# Patient Record
Sex: MALE | Race: WHITE | NOT HISPANIC OR LATINO
[De-identification: names, ages, dates, MRNs, and addresses within clinical notes are randomized per-mention and may not be internally consistent; named-entity substitution may affect disease eponyms.]

---

## 2017-02-21 ENCOUNTER — RESULT CHARGE (OUTPATIENT)
Age: 32
End: 2017-02-21

## 2017-02-24 ENCOUNTER — OUTPATIENT (OUTPATIENT)
Dept: OUTPATIENT SERVICES | Age: 32
LOS: 1 days | Discharge: HOME | End: 2017-02-24

## 2017-02-27 ENCOUNTER — APPOINTMENT (OUTPATIENT)
Dept: PEDIATRIC CARDIOLOGY | Facility: CLINIC | Age: 32
End: 2017-02-27

## 2017-02-27 VITALS
OXYGEN SATURATION: 98 % | SYSTOLIC BLOOD PRESSURE: 124 MMHG | WEIGHT: 130.07 LBS | BODY MASS INDEX: 23.05 KG/M2 | DIASTOLIC BLOOD PRESSURE: 68 MMHG | HEIGHT: 62.99 IN | RESPIRATION RATE: 16 BRPM | HEART RATE: 68 BPM

## 2017-03-06 ENCOUNTER — APPOINTMENT (OUTPATIENT)
Dept: ELECTROPHYSIOLOGY | Facility: CLINIC | Age: 32
End: 2017-03-06

## 2017-06-06 ENCOUNTER — APPOINTMENT (OUTPATIENT)
Dept: ELECTROPHYSIOLOGY | Facility: CLINIC | Age: 32
End: 2017-06-06

## 2017-06-06 VITALS — SYSTOLIC BLOOD PRESSURE: 134 MMHG | DIASTOLIC BLOOD PRESSURE: 76 MMHG | HEART RATE: 66 BPM

## 2017-09-12 ENCOUNTER — APPOINTMENT (OUTPATIENT)
Dept: ELECTROPHYSIOLOGY | Facility: CLINIC | Age: 32
End: 2017-09-12
Payer: COMMERCIAL

## 2017-09-12 PROCEDURE — 93294 REM INTERROG EVL PM/LDLS PM: CPT

## 2017-09-12 PROCEDURE — 93296 REM INTERROG EVL PM/IDS: CPT

## 2017-12-12 ENCOUNTER — APPOINTMENT (OUTPATIENT)
Dept: ELECTROPHYSIOLOGY | Facility: CLINIC | Age: 32
End: 2017-12-12
Payer: COMMERCIAL

## 2017-12-12 PROCEDURE — 93280 PM DEVICE PROGR EVAL DUAL: CPT

## 2018-02-22 ENCOUNTER — OUTPATIENT (OUTPATIENT)
Dept: OUTPATIENT SERVICES | Age: 33
LOS: 1 days | Discharge: HOME | End: 2018-02-22

## 2018-02-26 ENCOUNTER — APPOINTMENT (OUTPATIENT)
Dept: PEDIATRIC CARDIOLOGY | Facility: CLINIC | Age: 33
End: 2018-02-26
Payer: COMMERCIAL

## 2018-02-26 VITALS
DIASTOLIC BLOOD PRESSURE: 80 MMHG | HEART RATE: 70 BPM | SYSTOLIC BLOOD PRESSURE: 130 MMHG | OXYGEN SATURATION: 98 % | HEIGHT: 62.99 IN | WEIGHT: 130.07 LBS | BODY MASS INDEX: 23.05 KG/M2 | RESPIRATION RATE: 16 BRPM

## 2018-02-26 PROCEDURE — 93000 ELECTROCARDIOGRAM COMPLETE: CPT

## 2018-02-26 PROCEDURE — 99215 OFFICE O/P EST HI 40 MIN: CPT | Mod: 25

## 2018-03-26 ENCOUNTER — TRANSCRIPTION ENCOUNTER (OUTPATIENT)
Age: 33
End: 2018-03-26

## 2018-03-26 ENCOUNTER — APPOINTMENT (OUTPATIENT)
Dept: ELECTROPHYSIOLOGY | Facility: CLINIC | Age: 33
End: 2018-03-26
Payer: COMMERCIAL

## 2018-03-26 PROCEDURE — 93294 REM INTERROG EVL PM/LDLS PM: CPT

## 2018-03-26 PROCEDURE — 93296 REM INTERROG EVL PM/IDS: CPT

## 2018-06-01 ENCOUNTER — MESSAGE (OUTPATIENT)
Age: 33
End: 2018-06-01

## 2018-06-29 ENCOUNTER — NON-APPOINTMENT (OUTPATIENT)
Age: 33
End: 2018-06-29

## 2018-06-29 ENCOUNTER — APPOINTMENT (OUTPATIENT)
Dept: ELECTROPHYSIOLOGY | Facility: CLINIC | Age: 33
End: 2018-06-29
Payer: COMMERCIAL

## 2018-06-29 VITALS — DIASTOLIC BLOOD PRESSURE: 69 MMHG | SYSTOLIC BLOOD PRESSURE: 137 MMHG | HEART RATE: 70 BPM | RESPIRATION RATE: 14 BRPM

## 2018-06-29 VITALS
HEIGHT: 62.99 IN | WEIGHT: 124 LBS | DIASTOLIC BLOOD PRESSURE: 73 MMHG | SYSTOLIC BLOOD PRESSURE: 128 MMHG | HEART RATE: 66 BPM | OXYGEN SATURATION: 97 % | BODY MASS INDEX: 21.97 KG/M2

## 2018-06-29 PROCEDURE — 93280 PM DEVICE PROGR EVAL DUAL: CPT

## 2018-06-29 PROCEDURE — 99213 OFFICE O/P EST LOW 20 MIN: CPT

## 2018-06-29 PROCEDURE — 93000 ELECTROCARDIOGRAM COMPLETE: CPT | Mod: 59

## 2018-10-01 ENCOUNTER — APPOINTMENT (OUTPATIENT)
Dept: ELECTROPHYSIOLOGY | Facility: CLINIC | Age: 33
End: 2018-10-01
Payer: COMMERCIAL

## 2018-10-01 PROCEDURE — 93294 REM INTERROG EVL PM/LDLS PM: CPT

## 2018-10-01 PROCEDURE — 93296 REM INTERROG EVL PM/IDS: CPT

## 2019-01-04 ENCOUNTER — APPOINTMENT (OUTPATIENT)
Dept: ELECTROPHYSIOLOGY | Facility: CLINIC | Age: 34
End: 2019-01-04
Payer: MEDICARE

## 2019-01-04 PROCEDURE — 93280 PM DEVICE PROGR EVAL DUAL: CPT

## 2019-02-01 ENCOUNTER — OUTPATIENT (OUTPATIENT)
Dept: OUTPATIENT SERVICES | Age: 34
LOS: 1 days | Discharge: HOME | End: 2019-02-01

## 2019-02-04 ENCOUNTER — APPOINTMENT (OUTPATIENT)
Dept: PEDIATRIC CARDIOLOGY | Facility: CLINIC | Age: 34
End: 2019-02-04
Payer: MEDICARE

## 2019-02-04 VITALS
WEIGHT: 125.66 LBS | HEIGHT: 62.99 IN | SYSTOLIC BLOOD PRESSURE: 124 MMHG | DIASTOLIC BLOOD PRESSURE: 74 MMHG | RESPIRATION RATE: 16 BRPM | BODY MASS INDEX: 22.27 KG/M2 | OXYGEN SATURATION: 98 % | HEART RATE: 70 BPM

## 2019-02-04 PROCEDURE — 93325 DOPPLER ECHO COLOR FLOW MAPG: CPT

## 2019-02-04 PROCEDURE — 93320 DOPPLER ECHO COMPLETE: CPT

## 2019-02-04 PROCEDURE — 93303 ECHO TRANSTHORACIC: CPT

## 2019-02-04 PROCEDURE — 93000 ELECTROCARDIOGRAM COMPLETE: CPT

## 2019-02-04 PROCEDURE — 99215 OFFICE O/P EST HI 40 MIN: CPT | Mod: 25

## 2019-02-14 NOTE — PHYSICAL EXAM
[General Appearance - Alert] : alert [General Appearance - In No Acute Distress] : in no acute distress [General Appearance - Well-Appearing] : well appearing [Attitude Uncooperative] : cooperative [Facies] : the head and face were normal in appearance [Outer Ear] : the ears and nose were normal in appearance [Respiration, Rhythm And Depth] : normal respiratory rhythm and effort [No Cough] : no cough [Normal Chest Appearance] : the chest was normal in appearance [Chest Visual Inspection Thoracic Deformity] : no chest wall deformity [Chest Palpation Tender Sternum] : no chest wall tenderness [No Sternal Instability] : no sternal instability [Apical Impulse] : quiet precordium with normal apical impulse [Heart Rate And Rhythm] : normal heart rate and rhythm [Heart Sounds] : normal S1 and S2 [Systolic] : systolic [LUSB] : LUSB [Ejection] : ejection [Back] : the murmur was transmitted to the back [Diastolic] : diastolic [II] : a grade 2/4  [LMSB] : LMSB  [[place]] : the murmur was transmitted to the apex [Bowel Sounds] : normal bowel sounds [Abdomen Soft] : soft [Nondistended] : nondistended [Abdomen Tenderness] : non-tender [No HSM] : no hepatosplenomegaly noted [Nail Clubbing] : no clubbing  or cyanosis of the fingers [Musculoskeletal Exam: Normal Movement Of All Extremities] : normal movements of all extremities [Musculoskeletal - Swelling] : no joint swelling or joint tenderness [] : no rash [Sternotomy] : sternotomy [Unremarkable] : unremarkable [Demonstrated Behavior - Infant Nonreactive To Parents] : interactive [General Appearance - Well Nourished] : well nourished [General Appearance - Well Developed] : well developed [Appearance Of Head] : the head was normocephalic [Sclera] : the conjunctiva were normal [Examination Of The Oral Cavity] : mucous membranes were moist and pink [Oropharynx] : the oropharynx was normal [Stridor] : no stridor was observed [Liver Tender To Palpation] : not tender [Liver Enlarged] : not enlarged [Spleen Tender] : not tender [Spleen Enlarged] : not enlarged [Motor Tone] : muscle strength and tone were normal [Skin Lesions] : no lesions [Skin Turgor] : normal turgor [Skin Color & Pigmentation] : normal skin color and pigmentation [FreeTextEntry1] : bilateral sub clavicular incisions

## 2019-02-14 NOTE — REASON FOR VISIT
[Follow-Up] : a follow-up visit for [Pulmonary Valve Insufficiency] : pulmonary valve insufficiency [Patient] : patient [Parents] : parents [FreeTextEntry3] : S/P COA Repair and VSD Repair, Pacemaker

## 2019-02-14 NOTE — REVIEW OF SYSTEMS
[Nl] : Genitourinary [Feeling Poorly] : not feeling poorly (malaise) [Fever] : no fever [Wgt Loss (___ Lbs)] : no recent weight loss [Pallor] : not pale [Eye Discharge] : no eye discharge [Redness] : no redness [Change in Vision] : no change in vision [Nasal Stuffiness] : no nasal congestion [Sore Throat] : no sore throat [Earache] : no earache [Loss Of Hearing] : no hearing loss [Cyanosis] : no cyanosis [Edema] : no edema [Diaphoresis] : not diaphoretic [Chest Pain] : no chest pain or discomfort [Exercise Intolerance] : no persistence of exercise intolerance [Palpitations] : no palpitations [Orthopnea] : no orthopnea [Fast HR] : no tachycardia [Tachypnea] : not tachypneic [Wheezing] : no wheezing [Cough] : no cough [Shortness Of Breath] : not expressed as feeling short of breath [Vomiting] : no vomiting [Diarrhea] : no diarrhea [Abdominal Pain] : no abdominal pain [Decrease In Appetite] : appetite not decreased [Fainting (Syncope)] : no fainting [Seizure] : no seizures [Headache] : no headache [Dizziness] : no dizziness [Limping] : no limping [Joint Pains] : no arthralgias [Joint Swelling] : no joint swelling [Rash] : no rash [Wound problems] : no wound problems [Easy Bruising] : no tendency for easy bruising [Swollen Glands] : no lymphadenopathy [Easy Bleeding] : no ~M tendency for easy bleeding [Nosebleeds] : no epistaxis [Sleep Disturbances] : ~T no sleep disturbances [Hyperactive] : no hyperactive behavior [Depression] : no depression [Anxiety] : no anxiety [Failure To Thrive] : no failure to thrive [Short Stature] : short stature was not noted [Jitteriness] : no jitteriness [Heat/Cold Intolerance] : no temperature intolerance [Dec Urine Output] : no oliguria

## 2019-02-14 NOTE — CONSULT LETTER
[Today's Date] : [unfilled] [Name] : Name: [unfilled] [] : : ~~ [Today's Date:] : [unfilled] [Dear  ___:] : Dear Dr. [unfilled]: [Consult] : I had the pleasure of evaluating your patient, [unfilled]. My full evaluation follows. [Consult - Multiple Provider] : Thank you very much for allowing us to participate in the care of this patient. If you have any questions, please do not hesitate to contact us. [Sincerely,] : Sincerely, [FreeTextEntry4] : Trev Gillespie MD [FreeTextEntry5] : [address] [FreeTextEntry6] : [address] [FreeTextEntry7] : [place] [address] [FreeTextEntry8] : [phone] [de-identified] : \par Heather Mariano, MSN, CPNP-AC, PC\par Pediatric Cardiology, Adult Congenital Cardiology\par [facility]\par

## 2019-02-14 NOTE — HISTORY OF PRESENT ILLNESS
[FreeTextEntry1] : Denis was seen in followup cardiology consultation February 4, 2019 at the Adult Congenital Heart Disease Clinic at [facility] for evaluation of his cardiovascular status in the setting of previous surgical repair of a subaortic ventricular septal defect and coarctation of the aorta. He was accompanied to this visit by his parents.\par \par Denis underwent coarctation repair using a subclavian flap technique with pulmonary artery banding in infancy by Dr. Alanis at [facility]. He subsequently underwent takedown of the pulmonary artery band and patch closure of the subaortic ventricular septal defect. Denis later required resection of a subaortic membrane. In 1997, he underwent balloon angioplasty of her recurrent coarctation in the catheterization laboratory. In January 2005, Denis underwent transvenous dual-chamber pacemaker placement for high-grade atrioventricular block. In February 2013, Denis underwent pacemaker generator change with Dr. Bassam Garcia at [facility].\par \par Denis also has a past medical history of cerebral palsy and developmental delay as well as a seizure disorder which has been controlled with Tegretol. His parents maintain legal guardianship of him. He continues to receive regular dental care and an annual flu shot.\par \par Denis's interim history remains unchanged. Since his last visit, Denis has been asymptomatic according to his parents. His activity level is unchanged and they do not notice any change in his level of activity. He walks up and down the stairs in the house without any shortness of breath.He has no complaints of chest pain, palpitations, dizziness, exercise intolerance or syncope. He continues to participate actively in his day program at the [facility] [employer].

## 2019-02-14 NOTE — CARDIOLOGY SUMMARY
[Today's Date] : [unfilled] [FreeTextEntry1] : Atrial sensing, ventricular pacing with 100% capture [FreeTextEntry2] : Summary:\par 1. History of ventricular septal defect.\par 2. Status post placement of a main pulmonary artery band and status post takedown of main pulmonary\par artery band.\par 3. Status post subclavian flap repair of coarctation.\par 4. Status post patch closure of subaortic ventricular septal defect.\par 5. Status post resection of subaortic membrane.\par 6. Status post balloon angioplasty of recurrent coarctation.\par 7. Mild tricuspid valve regurgitation.\par 8. Based on tricuspid regurgitation gradient, estimated RVp is 30-35 mmHg plus the right atrial pressure.\par 9. The aortic isthmus and proximal aorta are diffusely hypoplastic with acceleration of flow in this region\par with the peak gradient of 30 mmHg. The descending aorta Doppler shows prolonged continuation of\par flow during diastole.\par 10. No residual left ventricular outflow tract obstruction.\par 11. Mild aortic valve regurgitation.\par 12. There is mild distortion of the main pulmonary artery segment related to the previous pulmonary artery\par band with an associated mild supravalvar pulmonary artery stenosis, with the peak gradient of 35\par mmHg and mean of 15 mmHg.\par 13. Moderate pulmonary valve regurgitation.\par 14. No residual ventricular septal defect.\par 15. Moderately dilated right ventricle.\par 16. Qualitatively normal right ventricular systolic function.\par 17. Mild concentric left ventricular hypertrophy.\par 18. Qualitatively normal left ventricular systolic function.\par 19. No pericardial effusion

## 2019-03-11 ENCOUNTER — FORM ENCOUNTER (OUTPATIENT)
Age: 34
End: 2019-03-11

## 2019-03-12 ENCOUNTER — OUTPATIENT (OUTPATIENT)
Dept: OUTPATIENT SERVICES | Facility: HOSPITAL | Age: 34
LOS: 1 days | End: 2019-03-12
Payer: MEDICARE

## 2019-03-12 ENCOUNTER — APPOINTMENT (OUTPATIENT)
Dept: CARDIOLOGY | Facility: CLINIC | Age: 34
End: 2019-03-12

## 2019-03-12 VITALS
RESPIRATION RATE: 16 BRPM | HEART RATE: 70 BPM | TEMPERATURE: 97 F | OXYGEN SATURATION: 100 % | SYSTOLIC BLOOD PRESSURE: 130 MMHG | DIASTOLIC BLOOD PRESSURE: 63 MMHG

## 2019-03-12 VITALS
DIASTOLIC BLOOD PRESSURE: 72 MMHG | HEART RATE: 67 BPM | RESPIRATION RATE: 16 BRPM | OXYGEN SATURATION: 96 % | SYSTOLIC BLOOD PRESSURE: 120 MMHG | TEMPERATURE: 97 F

## 2019-03-12 DIAGNOSIS — R07.9 CHEST PAIN, UNSPECIFIED: ICD-10-CM

## 2019-03-12 DIAGNOSIS — I37.1 NONRHEUMATIC PULMONARY VALVE INSUFFICIENCY: ICD-10-CM

## 2019-03-12 PROCEDURE — 75574 CT ANGIO HRT W/3D IMAGE: CPT

## 2019-03-12 PROCEDURE — 75574 CT ANGIO HRT W/3D IMAGE: CPT | Mod: 26

## 2019-04-08 ENCOUNTER — APPOINTMENT (OUTPATIENT)
Dept: ELECTROPHYSIOLOGY | Facility: CLINIC | Age: 34
End: 2019-04-08
Payer: MEDICARE

## 2019-04-08 PROCEDURE — 93296 REM INTERROG EVL PM/IDS: CPT

## 2019-04-08 PROCEDURE — 93294 REM INTERROG EVL PM/LDLS PM: CPT

## 2019-04-15 ENCOUNTER — OUTPATIENT (OUTPATIENT)
Dept: OUTPATIENT SERVICES | Age: 34
LOS: 1 days | End: 2019-04-15

## 2019-04-15 ENCOUNTER — APPOINTMENT (OUTPATIENT)
Dept: PEDIATRIC CARDIOLOGY | Facility: CLINIC | Age: 34
End: 2019-04-15
Payer: MEDICARE

## 2019-04-15 VITALS
HEIGHT: 63.15 IN | TEMPERATURE: 97 F | SYSTOLIC BLOOD PRESSURE: 128 MMHG | RESPIRATION RATE: 20 BRPM | HEART RATE: 88 BPM | WEIGHT: 124.78 LBS | DIASTOLIC BLOOD PRESSURE: 76 MMHG | OXYGEN SATURATION: 96 %

## 2019-04-15 VITALS
WEIGHT: 124.78 LBS | OXYGEN SATURATION: 96 % | RESPIRATION RATE: 18 BRPM | BODY MASS INDEX: 22.11 KG/M2 | HEART RATE: 88 BPM | SYSTOLIC BLOOD PRESSURE: 132 MMHG | HEIGHT: 63.15 IN | DIASTOLIC BLOOD PRESSURE: 72 MMHG

## 2019-04-15 VITALS — SYSTOLIC BLOOD PRESSURE: 128 MMHG | DIASTOLIC BLOOD PRESSURE: 59 MMHG

## 2019-04-15 DIAGNOSIS — Q25.1 COARCTATION OF AORTA: ICD-10-CM

## 2019-04-15 DIAGNOSIS — G80.9 CEREBRAL PALSY, UNSPECIFIED: ICD-10-CM

## 2019-04-15 DIAGNOSIS — I44.2 ATRIOVENTRICULAR BLOCK, COMPLETE: ICD-10-CM

## 2019-04-15 DIAGNOSIS — G40.909 EPILEPSY, UNSPECIFIED, NOT INTRACTABLE, WITHOUT STATUS EPILEPTICUS: ICD-10-CM

## 2019-04-15 LAB
ANION GAP SERPL CALC-SCNC: 14 MMO/L — SIGNIFICANT CHANGE UP (ref 7–14)
BLD GP AB SCN SERPL QL: NEGATIVE — SIGNIFICANT CHANGE UP
BUN SERPL-MCNC: 13 MG/DL — SIGNIFICANT CHANGE UP (ref 7–23)
CALCIUM SERPL-MCNC: 9.8 MG/DL — SIGNIFICANT CHANGE UP (ref 8.4–10.5)
CHLORIDE SERPL-SCNC: 102 MMOL/L — SIGNIFICANT CHANGE UP (ref 98–107)
CO2 SERPL-SCNC: 25 MMOL/L — SIGNIFICANT CHANGE UP (ref 22–31)
CREAT SERPL-MCNC: 0.66 MG/DL — SIGNIFICANT CHANGE UP (ref 0.5–1.3)
GLUCOSE SERPL-MCNC: 95 MG/DL — SIGNIFICANT CHANGE UP (ref 70–99)
HCT VFR BLD CALC: 46.6 % — SIGNIFICANT CHANGE UP (ref 39–50)
HGB BLD-MCNC: 15.7 G/DL — SIGNIFICANT CHANGE UP (ref 13–17)
MCHC RBC-ENTMCNC: 28.8 PG — SIGNIFICANT CHANGE UP (ref 27–34)
MCHC RBC-ENTMCNC: 33.7 % — SIGNIFICANT CHANGE UP (ref 32–36)
MCV RBC AUTO: 85.3 FL — SIGNIFICANT CHANGE UP (ref 80–100)
NRBC # FLD: 0 K/UL — SIGNIFICANT CHANGE UP (ref 0–0)
PLATELET # BLD AUTO: 213 K/UL — SIGNIFICANT CHANGE UP (ref 150–400)
PMV BLD: 10.1 FL — SIGNIFICANT CHANGE UP (ref 7–13)
POTASSIUM SERPL-MCNC: 4.4 MMOL/L — SIGNIFICANT CHANGE UP (ref 3.5–5.3)
POTASSIUM SERPL-SCNC: 4.4 MMOL/L — SIGNIFICANT CHANGE UP (ref 3.5–5.3)
RBC # BLD: 5.46 M/UL — SIGNIFICANT CHANGE UP (ref 4.2–5.8)
RBC # FLD: 11.7 % — SIGNIFICANT CHANGE UP (ref 10.3–14.5)
RH IG SCN BLD-IMP: POSITIVE — SIGNIFICANT CHANGE UP
SODIUM SERPL-SCNC: 141 MMOL/L — SIGNIFICANT CHANGE UP (ref 135–145)
WBC # BLD: 6.05 K/UL — SIGNIFICANT CHANGE UP (ref 3.8–10.5)
WBC # FLD AUTO: 6.05 K/UL — SIGNIFICANT CHANGE UP (ref 3.8–10.5)

## 2019-04-15 PROCEDURE — XXXXX: CPT

## 2019-04-15 NOTE — REASON FOR VISIT
[Initial Consultation] : an initial consultation for [Coarctation Of The Aorta] : coarctation of the aorta [Ventricular Septal Defect] : a ventricular septal defect [FreeTextEntry1] : subaortic membrane [Parents] : parents

## 2019-04-15 NOTE — H&P PST PEDIATRIC - ASSESSMENT
34 y/o with repaired subaortic VSD and aortic coarctation, dual chamber pacemaker for high grade A-V block.  He is 100% ventricular paced with atrial sensing.  He remains asymptomatic from a cardiac perspective.    His most recent ECHO demonstrates a diffusely hypoplastic proximal aorta with acceleration of flow (pk gradient 30 mmHg).  There is also mild AI with normal LV size and function.  Mild supravalvar AS is also noted (pk gradient 35mmHg) 34 y/o with developmental delays, intellectual disability, dystonic CP, well controlled seizure d/o, s/p CVA, repaired subaortic VSD and aortic coarctation, dual chamber pacemaker for high grade A-V block.  He is 100% ventricular paced with atrial sensing.  He remains asymptomatic from a cardiac perspective.    His most recent ECHO demonstrates a diffusely hypoplastic proximal aorta with acceleration of flow (pk gradient 30 mmHg).  There is also mild AI with normal LV size and function.  Mild supravalvar AS is also noted (pk gradient 35mmHg).    No h/o GA or hemostasis issues with prior interventional/surgical experiences.

## 2019-04-15 NOTE — H&P PST PEDIATRIC - SYMPTOMS
none s/p excision of benign moles last seizure greater than 13 years ago Medtronic dual chamber pacemaker Model#ADDRL1 Medtronic dual chamber pacemaker Model#ADDRL1  No SOB/palpitations/chest pain/change in tolerance of physical activity

## 2019-04-15 NOTE — H&P PST PEDIATRIC - CARDIOVASCULAR
details Symmetric upper and lower extremity pulses of normal amplitude/Regular rate and variability II/VI systolic ejection murmur heard best at LMSB and LUSB and heard posteriorly  palpable pulses in UE and LE  brisk cap refill

## 2019-04-15 NOTE — CONSULT LETTER
[Today's Date] : [unfilled] [Name] : Name: [unfilled] [] : : ~~ [Today's Date:] : [unfilled] [Dear  ___:] : Dear Dr. [unfilled]: [Consult] : I had the pleasure of evaluating your patient, [unfilled]. My full evaluation follows. [Consult - Single Provider] : Thank you very much for allowing me to participate in the care of this patient. If you have any questions, please do not hesitate to contact me. [Sincerely,] : Sincerely, [FreeTextEntry4] : Pete De Luna MD

## 2019-04-15 NOTE — H&P PST PEDIATRIC - ECHO AND INTERPRETATION
ECHO 2-4-19 mild TR with estimated RVp 30-35mmHg plus the right atrial pressure  the aortic isthmus and proximal aorta are diffusely hypoplastic with acceleration of flow (pk gradient 30 mmHg), no residual LVOTO, mild AI, mild distortion of the MPA segment related to the previous PA band with an associated mild supravalvar PS (pk gradient 35 mmHg), moderate PI, no residual VSD, moderate RVD with normal RV systolic function, mild concentric LVH, normal LV systolic function

## 2019-04-15 NOTE — H&P PST PEDIATRIC - GENITOURINARY
No costovertebral angle tenderness bilateral inguinal hernias with no adenopathy/erythema/rash/lesions

## 2019-04-15 NOTE — H&P PST PEDIATRIC - HEENT
negative Anicteric conjunctivae/Normal oropharynx/Normal dentition/Red reflex intact/External ear normal/No oral lesions/Extra occular movements intact/Normal tympanic membranes

## 2019-04-15 NOTE — H&P PST PEDIATRIC - COMMENTS
Denis is a 34 y/o male with CP, seizure d/o and developmental delays with PMH is remarkable for surgical repair of subaortic VSD and aortic coarctation using a subclavian flap technique with PA banding.  There was subsequent takedown of the PA banding and patch closure of subaortic VSD.  He also underwent resection of DANIELLE.  Balloon angioplasty was necessary for recurrence of coarctation.  He is also s/p transvenous dual-chamber pacemaker placement for high grade A-V block.  His most recent pacemaker generator change was in 2013.  Denis's level of activity remains unchanged.  He denies SOB, chest pain, palpitations, dizziness or exercise intolerance. Received Influenza vaccine this season Denis is a 32 y/o male with CP, h/o CVA, seizure d/o and developmental delays with PMH remarkable for surgical repair of subaortic VSD and aortic coarctation using a subclavian flap technique with PA banding.  There was subsequent takedown of the PA banding and patch closure of subaortic VSD.  He also underwent resection of DANILELE.  Balloon angioplasty was necessary for recurrence of coarctation.  He is also s/p transvenous dual-chamber pacemaker placement for high grade A-V block.  His most recent pacemaker generator change was in 2013.  Denis's level of activity remains unchanged.  He denies SOB, chest pain, palpitations, dizziness or exercise intolerance. Denis is a 32 y/o male with CP, dystonia,  h/o CVA, seizure d/o, intellectual disability and developmental delays with PMH remarkable for surgical repair of subaortic VSD and aortic coarctation using a subclavian flap technique with PA banding, subsequent takedown of the PA banding and patch closure of subaortic VSD.  He also underwent resection of DANIELLE.  Balloon angioplasty was necessary for recurrence of coarctation.  He is also s/p transvenous dual-chamber pacemaker placement for high grade A-V block.  His most recent pacemaker generator change was in 2013.  Denis's level of activity remains unchanged.  He denies SOB, chest pain, palpitations, dizziness or exercise intolerance.

## 2019-04-15 NOTE — H&P PST PEDIATRIC - NSICDXPASTSURGICALHX_GEN_ALL_CORE_FT
PAST SURGICAL HISTORY:  Arthropathy of Hip with reconstructive surgery of L hip    History of Achilles tendon repair bilateral    History of aortic coarctation repair     Pacemaker Medtronic dual chamber for 3rd degree AVB 3/4/05    S/P VSD repair with removal of pulmonary artery band

## 2019-04-15 NOTE — H&P PST PEDIATRIC - ANESTHESIA, PREVIOUS REACTION, PROFILE
none/No issues with sedation and GA experiencesMost recent GA exposure was for CT scan (due to dystonia)

## 2019-04-15 NOTE — H&P PST PEDIATRIC - NSICDXPROBLEM_GEN_ALL_CORE_FT
PROBLEM DIAGNOSES  Problem: Cerebral palsy  Assessment and Plan: May resume current therapies and mild physical education activities upon discharge as/when instructed by Cardiac Cath team    Problem: Seizure disorder  Assessment and Plan: Continue current AET regimen.  May take am dose, no later than 5 am, with sips of clears    Problem: Heart block AV complete  Assessment and Plan: Medtronic pacemaker with last generator change in 2013    Problem: Postsurgical recurrent coarctation of aorta  Assessment and Plan: Cardiac catheterization with stent placement

## 2019-04-15 NOTE — H&P PST PEDIATRIC - OTHER CARE PROVIDERS
Dr. Pete De Luna Dr. Pete De Luna; Dr Bassam Garcia (EPS); Dr Afua Jeffery (Neuro); Dr Raleigh Awad; Dr Doris Maldonado (Dental); Dr Neil Barker (DP) Dr. Pete De Luna; Dr Bassam Garcia (EPS); Dr Afua Jeffery (Neuro); Dr Raleigh Awad; Dr Doris Maldonado (Dental); Dr Neil Barker (DPM); Dr Kevin Lee ([facility])

## 2019-04-15 NOTE — H&P PST PEDIATRIC - REASON FOR ADMISSION
Pre Cardiac catheterization evaluation and preparation-scheduled for 4-16-19 with Dr. Taya Forde Pre procedural/sedation/anesthesia evaluation and preparation for Cardiac Catheterization, Stent placement -scheduled on 4-16-19 with Dr. Taya Forde

## 2019-04-15 NOTE — H&P PST PEDIATRIC - SKIN
details No rash/Skin intact and not indurated well healed surgical scars-b/L subclavicular and sternotomy

## 2019-04-15 NOTE — H&P PST PEDIATRIC - GROWTH AND DEVELOPMENT COMMENT, PEDS PROFILE
Hon[employer]ary member of the [employer]  Attends [facility] [employer]-Food shopping, delivers mail, delivers flowers  mows the lawn, plays yaneli novoa Honorary member of the [employer]  Attends [facility]- participates in food shopping, delivers mail, delivers flowers  mows the lawn, plays yaneli novoa Honorary member of the [employer]  Attends [facility]- participates in food shopping, delivers mail, delivers flowers  mows the lawn (supervised), plays yaneli novoa

## 2019-04-15 NOTE — H&P PST PEDIATRIC - EXTREMITIES
FROM limited by CP, dystonia but able to position himself at the exam table  able to ambulate independently

## 2019-04-15 NOTE — H&P PST PEDIATRIC - NSICDXPASTMEDICALHX_GEN_ALL_CORE_FT
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

## 2019-04-16 ENCOUNTER — OUTPATIENT (OUTPATIENT)
Dept: INPATIENT UNIT | Age: 34
LOS: 1 days | Discharge: HOME | End: 2019-04-16

## 2019-04-16 VITALS
RESPIRATION RATE: 16 BRPM | HEART RATE: 76 BPM | SYSTOLIC BLOOD PRESSURE: 111 MMHG | OXYGEN SATURATION: 96 % | DIASTOLIC BLOOD PRESSURE: 68 MMHG

## 2019-04-16 VITALS
SYSTOLIC BLOOD PRESSURE: 145 MMHG | RESPIRATION RATE: 18 BRPM | TEMPERATURE: 98 F | OXYGEN SATURATION: 97 % | HEART RATE: 65 BPM | DIASTOLIC BLOOD PRESSURE: 75 MMHG | WEIGHT: 124.78 LBS | HEIGHT: 63.15 IN

## 2019-04-16 DIAGNOSIS — Q25.1 COARCTATION OF AORTA: ICD-10-CM

## 2019-04-16 RX ORDER — SODIUM CHLORIDE 9 MG/ML
1000 INJECTION, SOLUTION INTRAVENOUS
Qty: 0 | Refills: 0 | Status: DISCONTINUED | OUTPATIENT
Start: 2019-04-16 | End: 2019-04-16

## 2019-04-16 RX ORDER — ONDANSETRON 8 MG/1
4 TABLET, FILM COATED ORAL ONCE
Qty: 0 | Refills: 0 | Status: COMPLETED | OUTPATIENT
Start: 2019-04-16 | End: 2019-04-16

## 2019-04-16 RX ORDER — ACETAMINOPHEN 500 MG
650 TABLET ORAL EVERY 6 HOURS
Qty: 0 | Refills: 0 | Status: DISCONTINUED | OUTPATIENT
Start: 2019-04-16 | End: 2019-04-16

## 2019-04-16 RX ADMIN — SODIUM CHLORIDE 50 MILLILITER(S): 9 INJECTION, SOLUTION INTRAVENOUS at 16:45

## 2019-04-16 RX ADMIN — ONDANSETRON 8 MILLIGRAM(S): 8 TABLET, FILM COATED ORAL at 16:45

## 2019-04-16 NOTE — PROCEDURE NOTE - SUPERVISORY STATEMENT
I was present and scrubbed for entire case. Agree with above note including A/P with corrections made to above.

## 2019-04-16 NOTE — ASU DISCHARGE PLAN (ADULT/PEDIATRIC) - CARE PROVIDER_API CALL
Pete De Luna (MD)  Adult Congenital Heart Disease; Pediatric Cardiology; Pediatrics  [id number] [address] [place] [address]  Phone: [phone]  Fax: [phone]  Follow Up Time:

## 2019-04-16 NOTE — PROCEDURE NOTE - GENERAL INDICATIONS
History of subaortic VSD and coarctation now with recoarctation an PAPVR S/p repair of subaortic VSD and coarctation, now with possible re-coarctation and newly diagnosed PAPVC

## 2019-04-16 NOTE — ASU DISCHARGE PLAN (ADULT/PEDIATRIC) - CALL YOUR DOCTOR IF YOU HAVE ANY OF THE FOLLOWING:
Nausea and vomiting that does not stop/Bleeding that does not stop/Swelling that gets worse/Pain not relieved by Medications/Wound/Surgical Site with redness, or foul smelling discharge or pus/Numbness, tingling, color or temperature change to extremity

## 2019-04-16 NOTE — PROCEDURE NOTE - ADDITIONAL PROCEDURE DETAILS
Access LFV 7Fr ; RFA 5 Fr. 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 Normal right sided pressure. There is PSEG across the arch and normal LVEDP at 10 mmHg Access LFV 7Fr ; RFA 5 Fr. 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 Normal right sided pressure. There is PSEG across the arch and normal LVEDP at 10 mmHg. On angiogram, the transverse arch mildly hypoplastic with no discrete stenosis. 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    A/P: 34yo M s/p cath for hemodynamic evaluation and delineate the anatomy.   - RR then d/c later today.  - IF NO palpable PULSE, call cards   - F/u with Dr De Luna (primary cards) in 1 month.  - Above shared wfamily & primary cards. Access LFV 7Fr ; RFA 5 Fr with US guidance.   Sat data (%): SVC 71 , PA 83, Ao 98; Nl CI with Qp:Qs from 1.4 to 1.7 due to anomalous RUPV to RSVC.  Normal left and right heart pressures with < 10mmHg PSEG across tortuous aortic arch. .  Angios showed transverse & distal arch mildly hypoplastic with some tortuosity but no discrete stenosis; patent LSCA. 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 Free PI (annulus 35+mm) with mild supravalvar narrowin (24mm) at site of prior band and no signif branch PA obstruction    A/P: 32yo M s/p VSD & coarct repair with free PI and newly dx'd PAPVC, now s/p diagnostic cath with stable hemodynamics.  - RR then d/c later today.  - IF NO palpable PULSE, call cards   - F/u with Dr De Luna ([facility] team) in 1 month.  - Above shared w family & primary cards.  - will discuss at cath ^ V surg conference re: further surgical plan for PVR and PAPVC

## 2019-04-17 PROBLEM — G40.909 EPILEPSY, UNSPECIFIED, NOT INTRACTABLE, WITHOUT STATUS EPILEPTICUS: Chronic | Status: ACTIVE | Noted: 2019-04-15

## 2019-04-17 PROBLEM — F79 UNSPECIFIED INTELLECTUAL DISABILITIES: Chronic | Status: ACTIVE | Noted: 2019-04-15

## 2019-05-20 ENCOUNTER — APPOINTMENT (OUTPATIENT)
Dept: PEDIATRIC CARDIOLOGY | Facility: CLINIC | Age: 34
End: 2019-05-20
Payer: MEDICARE

## 2019-05-20 PROCEDURE — 99213 OFFICE O/P EST LOW 20 MIN: CPT

## 2019-06-28 ENCOUNTER — APPOINTMENT (OUTPATIENT)
Dept: CARDIOTHORACIC SURGERY | Facility: CLINIC | Age: 34
End: 2019-06-28
Payer: MEDICARE

## 2019-06-28 DIAGNOSIS — Q26.3 PARTIAL ANOMALOUS PULMONARY VENOUS CONNECTION: ICD-10-CM

## 2019-06-28 DIAGNOSIS — Q22.1 CONGENITAL PULMONARY VALVE STENOSIS: ICD-10-CM

## 2019-06-28 DIAGNOSIS — Q21.0 VENTRICULAR SEPTAL DEFECT: ICD-10-CM

## 2019-06-28 DIAGNOSIS — Q25.1 COARCTATION OF AORTA: ICD-10-CM

## 2019-06-28 PROCEDURE — 99204 OFFICE O/P NEW MOD 45 MIN: CPT

## 2019-07-10 ENCOUNTER — APPOINTMENT (OUTPATIENT)
Dept: ELECTROPHYSIOLOGY | Facility: CLINIC | Age: 34
End: 2019-07-10
Payer: MEDICARE

## 2019-07-10 VITALS — DIASTOLIC BLOOD PRESSURE: 77 MMHG | SYSTOLIC BLOOD PRESSURE: 131 MMHG | RESPIRATION RATE: 14 BRPM | HEART RATE: 75 BPM

## 2019-07-10 PROCEDURE — 93280 PM DEVICE PROGR EVAL DUAL: CPT

## 2019-07-23 NOTE — CONSULT LETTER
[Sincerely,] : Sincerely, [Dear  ___] : Dear  [unfilled], [FreeTextEntry2] : June 28, 2019\par \par Pete De Luna MD\par [address] [place] [address]\par [place] [id number] [FreeTextEntry1] : I had the pleasure of seeing your patient Denis Ortiz in the office today for a preop visit.  He is referred for pulmonary valve replacement.  The procedure with risks, benefits and alternatives were explained in detail to his parents.  They asked excellent questions and are willing to proceed.  They will select a date after reviewing their calendar for upcoming events.  \par \par Once again, I would like to thank you for allowing us to participate in Denis's care.  If I can be of any further assistance to you with Denis or any other patient please do not hesitate to contact our office. [FreeTextEntry3] : Luis Enrique Crowley MD\par Surgeon-in-Chief\par \par CC:  Bassam Garcia MD

## 2019-07-23 NOTE — ASSESSMENT
[FreeTextEntry1] : [age] year old male who is status post coarctation repair and vsd closure.  He has severe pulmonary regurgitation from a previous pulmonary artery debanding and complete heart block for which he has a permanent transvenous pacemaker.  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  He is referred for pulmonary valve replacement.  The procedure with risks, benefits and alternatives was explained in detail.  All questions were answered.  Need for anesthesia and blood transfusion were explained.  Risk of death, bleeding, infection and neurologic injury were specifically explained.  All questions were answered.  .

## 2019-07-23 NOTE — HISTORY OF PRESENT ILLNESS
[FreeTextEntry1] : This patient is [age] years old.  He was born with coarctation of the aorta and a ventricular septal defect.  He was transferred from [facility] to [facility] at several days of life.  He sustained cardiac arrest at the time of transport and subsequently was resuscitated.  He was brought to the operating room and underwent a coarctation repair and a pulmonary artery banding through a left thoracotomy.  On another admission he underwent VSD closure and pulmonary artery debanding.  Subsequently he underwent resection of a subaortic membrane.  He is developmentally delayed and has a seizure diathesis for which he takes medication.  He has dystonia for which he is also medicated.  He has been noted for some time to have severe pulmonary regurgitation and an enlarging right ventricle.  He underwent cardiac catheterization which showed normal pulmonary artery pressure and severe pulmonary regurgitation.  He has complete heart block and has a transvenous, dual chamber pacing system inserted through the right subclavian vein.  He has an LSVC and no evidence of an innominate vein.  He is referred for pulmonary valve replacement.

## 2019-07-23 NOTE — DATA REVIEWED
[FreeTextEntry1] : Underlying rhythm is complete heart block.  He has a dual chamber pacing system in place.\par CT Angio:  No obstruction on the left or right sided outflow tracts.  Good size branch pulmonary arteries.  \par Cardiac Cath:  Confirms above findings.  Partial anomalous pulmonary venous return of the right upper pulmonary vein with a Qp/Qs of 1.6 to 1.

## 2019-07-30 ENCOUNTER — OUTPATIENT (OUTPATIENT)
Dept: OUTPATIENT SERVICES | Age: 34
LOS: 1 days | End: 2019-07-30

## 2019-07-30 ENCOUNTER — APPOINTMENT (OUTPATIENT)
Dept: CARDIOTHORACIC SURGERY | Facility: CLINIC | Age: 34
End: 2019-07-30
Payer: MEDICARE

## 2019-07-30 VITALS
RESPIRATION RATE: 16 BRPM | HEART RATE: 68 BPM | TEMPERATURE: 97 F | SYSTOLIC BLOOD PRESSURE: 137 MMHG | OXYGEN SATURATION: 98 % | WEIGHT: 123.02 LBS | DIASTOLIC BLOOD PRESSURE: 69 MMHG | HEIGHT: 62.76 IN

## 2019-07-30 DIAGNOSIS — Q22.1 CONGENITAL PULMONARY VALVE STENOSIS: ICD-10-CM

## 2019-07-30 DIAGNOSIS — G40.909 EPILEPSY, UNSPECIFIED, NOT INTRACTABLE, WITHOUT STATUS EPILEPTICUS: ICD-10-CM

## 2019-07-30 DIAGNOSIS — Z92.89 PERSONAL HISTORY OF OTHER MEDICAL TREATMENT: Chronic | ICD-10-CM

## 2019-07-30 DIAGNOSIS — Z98.890 OTHER SPECIFIED POSTPROCEDURAL STATES: Chronic | ICD-10-CM

## 2019-07-30 LAB
ANION GAP SERPL CALC-SCNC: 9 MMO/L — SIGNIFICANT CHANGE UP (ref 7–14)
BLD GP AB SCN SERPL QL: NEGATIVE — SIGNIFICANT CHANGE UP
BUN SERPL-MCNC: 13 MG/DL — SIGNIFICANT CHANGE UP (ref 7–23)
CALCIUM SERPL-MCNC: 9.7 MG/DL — SIGNIFICANT CHANGE UP (ref 8.4–10.5)
CHLORIDE SERPL-SCNC: 103 MMOL/L — SIGNIFICANT CHANGE UP (ref 98–107)
CO2 SERPL-SCNC: 29 MMOL/L — SIGNIFICANT CHANGE UP (ref 22–31)
CREAT SERPL-MCNC: 0.77 MG/DL — SIGNIFICANT CHANGE UP (ref 0.5–1.3)
GLUCOSE SERPL-MCNC: 94 MG/DL — SIGNIFICANT CHANGE UP (ref 70–99)
HCT VFR BLD CALC: 45.1 % — SIGNIFICANT CHANGE UP (ref 39–50)
HGB BLD-MCNC: 14.8 G/DL — SIGNIFICANT CHANGE UP (ref 13–17)
MAGNESIUM SERPL-MCNC: 2 MG/DL — SIGNIFICANT CHANGE UP (ref 1.6–2.6)
MCHC RBC-ENTMCNC: 27.9 PG — SIGNIFICANT CHANGE UP (ref 27–34)
MCHC RBC-ENTMCNC: 32.8 % — SIGNIFICANT CHANGE UP (ref 32–36)
MCV RBC AUTO: 84.9 FL — SIGNIFICANT CHANGE UP (ref 80–100)
NRBC # FLD: 0 K/UL — SIGNIFICANT CHANGE UP (ref 0–0)
PHOSPHATE SERPL-MCNC: 2.9 MG/DL — SIGNIFICANT CHANGE UP (ref 2.5–4.5)
PLATELET # BLD AUTO: 218 K/UL — SIGNIFICANT CHANGE UP (ref 150–400)
PMV BLD: 10 FL — SIGNIFICANT CHANGE UP (ref 7–13)
POTASSIUM SERPL-MCNC: 4.7 MMOL/L — SIGNIFICANT CHANGE UP (ref 3.5–5.3)
POTASSIUM SERPL-SCNC: 4.7 MMOL/L — SIGNIFICANT CHANGE UP (ref 3.5–5.3)
RBC # BLD: 5.31 M/UL — SIGNIFICANT CHANGE UP (ref 4.2–5.8)
RBC # FLD: 11.8 % — SIGNIFICANT CHANGE UP (ref 10.3–14.5)
RH IG SCN BLD-IMP: POSITIVE — SIGNIFICANT CHANGE UP
SODIUM SERPL-SCNC: 141 MMOL/L — SIGNIFICANT CHANGE UP (ref 135–145)
WBC # BLD: 5.66 K/UL — SIGNIFICANT CHANGE UP (ref 3.8–10.5)
WBC # FLD AUTO: 5.66 K/UL — SIGNIFICANT CHANGE UP (ref 3.8–10.5)

## 2019-07-30 PROCEDURE — ZZZZZ: CPT

## 2019-07-30 NOTE — H&P PST ADULT - NSICDXPASTMEDICALHX_GEN_ALL_CORE_FT
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

## 2019-07-30 NOTE — H&P PST ADULT - ASSESSMENT
35yo M with complex PMH.     No evidence of acute illness or infection.     No family h/o adverse reactions to anesthesia or excessive bleeding.     Aware to notify surgeon's office if Pt develops any s/s of acute illness prior to DOS.     *Chlorhexidine wipes given. States understanding of use.     *Parents given Mupirocin ointment with instructions to begin use on 7/31/19. Parents aware we will call with nasal culture results within 3-4 days.

## 2019-07-30 NOTE — H&P PST ADULT - HISTORY OF PRESENT ILLNESS
33yo M with PMH significant for CP, dystonia, h/o CVA, seizure d/o, intellectual disability and developmental delays. 35yo M with PMH significant for CP, dystonia, h/o CVA, seizure d/o, intellectual disability and developmental delays. He is s/p surgical repair of subaortic VSD and aortic coarctation using a subclavian flap technique with PA banding, subsequent takedown of PA banding and patch closure of subaortic VSD. Pt also underwent resection of DANIELLE. Balloon angioplasty was necessary for recurrence of coarctation. In addition he required dual chamber pacemaker placement for high grade AV block. He is 100% ventricular paced with atrial sensing. His most recent pacemaker interrogation was on 7/10/19 (report attached) and his last pacemaker generator change was in 2013. His parents and Denis deny any recent change in activity level including SOB, chest pain, palpitations, dizziness or exercise intolerance.     He was last seen in PST in April 2019, prior to cardiac catheterization. He is now scheduled for pulmonary valve replacement with Dr. Crowley.     No h/o anesthetic or surgical complications with prior procedures.     Denies any recent acute illness in the past two weeks.

## 2019-07-30 NOTE — H&P PST ADULT - NS MD HP INPLANTS MED DEV
Pacemaker/Implant date: 2/27/13; Serial # [id number]; Model# ADDRL1 Medtronic dual chamber pacemaker: Implant date: 2/27/13; Serial # [id number]; Model# ADDRL1/Pacemaker

## 2019-07-30 NOTE — H&P PST ADULT - OTHER CARE PROVIDERS
Cardiologist: Dr. Pete De Luna; Dr Bassam Garcia (EPS- manages pacemaker); Dr Afua Jeffery (Neuro); Dr Raleigh Awad (Dermatologist); Dr Doris Maldonado (Dental); Dr Neil Barker (DPM); Dr Kevin Lee (Ophtho)

## 2019-07-30 NOTE — H&P PST ADULT - NSICDXPASTSURGICALHX_GEN_ALL_CORE_FT
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 Forde    H/O CT scan w/sedation, March 2019    History of Achilles tendon repair bilateral    History of aortic coarctation repair     Pacemaker Medtronic dual chamber for 3rd degree AVB 3/4/05    S/P VSD repair with removal of pulmonary artery band

## 2019-07-30 NOTE — H&P PST ADULT - LAB RESULTS AND INTERPRETATION
CBC, BMP, T&S and nasal culture ordered as indicated. CBC, BMP, T&S and nasal culture ordered as indicated.  Parents report recent Tegretol level was WNL - awaiting result.

## 2019-07-30 NOTE — H&P PST ADULT - NEUROLOGICAL COMMENTS
no seizures in over 13 yrs. well controlled on Tegretol. +dystonia +dystonia, answers questions appropriately however speech is slightly unclear at times.

## 2019-07-30 NOTE — H&P PST ADULT - EKG AND INTERPRETATION
ECHO continued:  10. No residual left ventricular outflow tract obstruction.  11. Mild aortic valve regurgitation.  12. There is mild distortion of the main pulmonary artery segment related to the previous pulmonary artery band with an associated mild supravalvar pulmonary artery stenosis, with the peak gradient of 35 mmHg and mean of 15 mmHg.  13. Moderate pulmonary valve regurgitation.  14. No residual ventricular septal defect.  15. Moderately dilated right ventricle.  16. Qualitatively normal right ventricular systolic function.  17. Mild concentric left ventricular hypertrophy.  18. Qualitatively normal left ventricular systolic function.  19. No pericardial effusion.    Electronically Signed By:  Aidan Lorenzo MD on 2/4/2019 at 1:00:29 PM

## 2019-07-30 NOTE — H&P PST ADULT - RADIOLOGY RESULTS AND INTERPRETATION
Confirmed with JOSE Evangelista RN. Child does not need CXR today as CT scan was obtained in March 2019.    ECHO Summary:   1. History of ventricular septal defect.   2. Status post placement of a main pulmonary artery band and status post takedown of main pulmonary artery band.   3. Status post subclavian flap repair of coarctation.   4. Status post patch closure of subaortic ventricular septal defect.   5. Status post resection of subaortic membrane.   6. Status post balloon angioplasty of recurrent coarctation.   7. Mild tricuspid valve regurgitation.   8. Based on tricuspid regurgitation gradient, estimated RVp is 30-35 mmHg plus the right atrial pressure.  9. The aortic isthmus and proximal aorta are diffusely hypoplastic with acceleration of flow in this region with the peak gradient of 30 mmHg. The descending aorta Doppler shows prolonged continuation of flow during diastole.

## 2019-07-30 NOTE — H&P PST ADULT - REASON FOR ADMISSION
PST evaluation in preparation for resternotomy for pulmonary valve replacement on 8/5/19 with Dr. Crowley.

## 2019-07-30 NOTE — H&P PST ADULT - COMMENTS
Pt is honorary member of fire department. He attends [employer] - participates in food shopping, delivers mail, mows the lawn (supervised) and enjoys bowling.

## 2019-07-30 NOTE — H&P PST ADULT - NSICDXPROBLEM_GEN_ALL_CORE_FT
PROBLEM DIAGNOSES  Problem: Congenital pulmonary valve stenosis  Assessment and Plan: scheduled for resternotomy for pulmonary valve replacement on 8/5/19 with Dr. Crowley. PROBLEM DIAGNOSES  Problem: Congenital pulmonary valve stenosis  Assessment and Plan: scheduled for resternotomy for pulmonary valve replacement on 8/5/19 with Dr. Crowley.     Problem: Seizure disorder  Assessment and Plan: maintain seizure precautions. continue Tegretol as prescribed.

## 2019-07-31 LAB
BACTERIA NPH CULT: SIGNIFICANT CHANGE UP
SPECIMEN SOURCE: SIGNIFICANT CHANGE UP

## 2019-08-05 ENCOUNTER — INPATIENT (INPATIENT)
Age: 34
LOS: 3 days | Discharge: HOME | End: 2019-08-09
Attending: THORACIC SURGERY (CARDIOTHORACIC VASCULAR SURGERY) | Admitting: THORACIC SURGERY (CARDIOTHORACIC VASCULAR SURGERY)
Payer: MEDICARE

## 2019-08-05 ENCOUNTER — RESULT REVIEW (OUTPATIENT)
Age: 34
End: 2019-08-05

## 2019-08-05 VITALS
TEMPERATURE: 97 F | OXYGEN SATURATION: 100 % | DIASTOLIC BLOOD PRESSURE: 89 MMHG | HEART RATE: 68 BPM | HEIGHT: 62.76 IN | RESPIRATION RATE: 14 BRPM | SYSTOLIC BLOOD PRESSURE: 126 MMHG | WEIGHT: 123.02 LBS

## 2019-08-05 DIAGNOSIS — G80.9 CEREBRAL PALSY, UNSPECIFIED: ICD-10-CM

## 2019-08-05 DIAGNOSIS — I44.2 ATRIOVENTRICULAR BLOCK, COMPLETE: ICD-10-CM

## 2019-08-05 DIAGNOSIS — Z98.890 OTHER SPECIFIED POSTPROCEDURAL STATES: Chronic | ICD-10-CM

## 2019-08-05 DIAGNOSIS — Q22.1 CONGENITAL PULMONARY VALVE STENOSIS: ICD-10-CM

## 2019-08-05 DIAGNOSIS — Z92.89 PERSONAL HISTORY OF OTHER MEDICAL TREATMENT: Chronic | ICD-10-CM

## 2019-08-05 DIAGNOSIS — Z48.812 ENCOUNTER FOR SURGICAL AFTERCARE FOLLOWING SURGERY ON THE CIRCULATORY SYSTEM: ICD-10-CM

## 2019-08-05 DIAGNOSIS — I37.1 NONRHEUMATIC PULMONARY VALVE INSUFFICIENCY: ICD-10-CM

## 2019-08-05 DIAGNOSIS — Z95.0 PRESENCE OF CARDIAC PACEMAKER: ICD-10-CM

## 2019-08-05 DIAGNOSIS — F79 UNSPECIFIED INTELLECTUAL DISABILITIES: ICD-10-CM

## 2019-08-05 DIAGNOSIS — Q21.0 VENTRICULAR SEPTAL DEFECT: ICD-10-CM

## 2019-08-05 LAB
ALBUMIN SERPL ELPH-MCNC: 4.2 G/DL — SIGNIFICANT CHANGE UP (ref 3.3–5)
ALP SERPL-CCNC: 59 U/L — SIGNIFICANT CHANGE UP (ref 40–120)
ALT FLD-CCNC: 18 U/L — SIGNIFICANT CHANGE UP (ref 4–41)
ANION GAP SERPL CALC-SCNC: 12 MMO/L — SIGNIFICANT CHANGE UP (ref 7–14)
AST SERPL-CCNC: 21 U/L — SIGNIFICANT CHANGE UP (ref 4–40)
BASE EXCESS BLDA CALC-SCNC: 0 MMOL/L — SIGNIFICANT CHANGE UP
BASE EXCESS BLDA CALC-SCNC: 1.3 MMOL/L — SIGNIFICANT CHANGE UP
BASE EXCESS BLDA CALC-SCNC: 1.8 MMOL/L — SIGNIFICANT CHANGE UP
BASE EXCESS BLDA CALC-SCNC: 2 MMOL/L — SIGNIFICANT CHANGE UP
BASE EXCESS BLDA CALC-SCNC: 2.7 MMOL/L — SIGNIFICANT CHANGE UP
BASE EXCESS BLDV CALC-SCNC: 2.2 MMOL/L — SIGNIFICANT CHANGE UP
BASOPHILS # BLD AUTO: 0.03 K/UL — SIGNIFICANT CHANGE UP (ref 0–0.2)
BASOPHILS NFR BLD AUTO: 0.1 % — SIGNIFICANT CHANGE UP (ref 0–2)
BASOPHILS NFR SPEC: 0 % — SIGNIFICANT CHANGE UP (ref 0–2)
BILIRUB SERPL-MCNC: 0.6 MG/DL — SIGNIFICANT CHANGE UP (ref 0.2–1.2)
BUN SERPL-MCNC: 11 MG/DL — SIGNIFICANT CHANGE UP (ref 7–23)
CA-I BLDA-SCNC: 1.04 MMOL/L — LOW (ref 1.15–1.29)
CA-I BLDA-SCNC: 1.13 MMOL/L — LOW (ref 1.15–1.29)
CA-I BLDA-SCNC: 1.14 MMOL/L — LOW (ref 1.15–1.29)
CA-I BLDA-SCNC: 1.31 MMOL/L — HIGH (ref 1.15–1.29)
CALCIUM SERPL-MCNC: 8.2 MG/DL — LOW (ref 8.4–10.5)
CHLORIDE SERPL-SCNC: 104 MMOL/L — SIGNIFICANT CHANGE UP (ref 98–107)
CO2 SERPL-SCNC: 25 MMOL/L — SIGNIFICANT CHANGE UP (ref 22–31)
CREAT SERPL-MCNC: 0.64 MG/DL — SIGNIFICANT CHANGE UP (ref 0.5–1.3)
EOSINOPHIL # BLD AUTO: 0.01 K/UL — SIGNIFICANT CHANGE UP (ref 0–0.5)
EOSINOPHIL NFR BLD AUTO: 0 % — SIGNIFICANT CHANGE UP (ref 0–6)
EOSINOPHIL NFR FLD: 1 % — SIGNIFICANT CHANGE UP (ref 0–6)
GLUCOSE BLDA-MCNC: 122 MG/DL — HIGH (ref 70–99)
GLUCOSE BLDA-MCNC: 123 MG/DL — HIGH (ref 70–99)
GLUCOSE BLDA-MCNC: 147 MG/DL — HIGH (ref 70–99)
GLUCOSE BLDA-MCNC: 147 MG/DL — HIGH (ref 70–99)
GLUCOSE BLDA-MCNC: 98 MG/DL — SIGNIFICANT CHANGE UP (ref 70–99)
GLUCOSE SERPL-MCNC: 138 MG/DL — HIGH (ref 70–99)
HCO3 BLDA-SCNC: 24 MMOL/L — SIGNIFICANT CHANGE UP (ref 22–26)
HCO3 BLDA-SCNC: 26 MMOL/L — SIGNIFICANT CHANGE UP (ref 22–26)
HCO3 BLDA-SCNC: 26 MMOL/L — SIGNIFICANT CHANGE UP (ref 22–26)
HCO3 BLDA-SCNC: 27 MMOL/L — HIGH (ref 22–26)
HCO3 BLDA-SCNC: 27 MMOL/L — HIGH (ref 22–26)
HCO3 BLDV-SCNC: 26 MMOL/L — SIGNIFICANT CHANGE UP (ref 20–27)
HCT VFR BLD CALC: 39 % — SIGNIFICANT CHANGE UP (ref 39–50)
HCT VFR BLDA CALC: 38.1 % — LOW (ref 39–51)
HCT VFR BLDA CALC: 38.8 % — LOW (ref 39–51)
HCT VFR BLDA CALC: 39.7 % — SIGNIFICANT CHANGE UP (ref 39–51)
HCT VFR BLDA CALC: 43.2 % — SIGNIFICANT CHANGE UP (ref 39–51)
HCT VFR BLDA CALC: 43.5 % — SIGNIFICANT CHANGE UP (ref 39–51)
HGB BLD-MCNC: 13.2 G/DL — SIGNIFICANT CHANGE UP (ref 13–17)
HGB BLDA-MCNC: 12.4 G/DL — LOW (ref 13–17)
HGB BLDA-MCNC: 12.6 G/DL — LOW (ref 13–17)
HGB BLDA-MCNC: 12.9 G/DL — LOW (ref 13–17)
HGB BLDA-MCNC: 14.1 G/DL — SIGNIFICANT CHANGE UP (ref 13–17)
HGB BLDA-MCNC: 14.2 G/DL — SIGNIFICANT CHANGE UP (ref 13–17)
IMM GRANULOCYTES NFR BLD AUTO: 0.8 % — SIGNIFICANT CHANGE UP (ref 0–1.5)
LACTATE BLDA-SCNC: 0.7 MMOL/L — SIGNIFICANT CHANGE UP (ref 0.5–2)
LACTATE BLDA-SCNC: 0.9 MMOL/L — SIGNIFICANT CHANGE UP (ref 0.5–2)
LACTATE BLDA-SCNC: 1 MMOL/L — SIGNIFICANT CHANGE UP (ref 0.5–2)
LACTATE BLDA-SCNC: 1.2 MMOL/L — SIGNIFICANT CHANGE UP (ref 0.5–2)
LACTATE BLDA-SCNC: 1.4 MMOL/L — SIGNIFICANT CHANGE UP (ref 0.5–2)
LYMPHOCYTES # BLD AUTO: 1.65 K/UL — SIGNIFICANT CHANGE UP (ref 1–3.3)
LYMPHOCYTES # BLD AUTO: 7.6 % — LOW (ref 13–44)
LYMPHOCYTES NFR SPEC AUTO: 13 % — SIGNIFICANT CHANGE UP (ref 13–44)
MAGNESIUM SERPL-MCNC: 1.7 MG/DL — SIGNIFICANT CHANGE UP (ref 1.6–2.6)
MANUAL SMEAR VERIFICATION: SIGNIFICANT CHANGE UP
MCHC RBC-ENTMCNC: 28.5 PG — SIGNIFICANT CHANGE UP (ref 27–34)
MCHC RBC-ENTMCNC: 33.8 % — SIGNIFICANT CHANGE UP (ref 32–36)
MCV RBC AUTO: 84.2 FL — SIGNIFICANT CHANGE UP (ref 80–100)
METAMYELOCYTES # FLD: 1 % — SIGNIFICANT CHANGE UP (ref 0–1)
MONOCYTES # BLD AUTO: 2.67 K/UL — HIGH (ref 0–0.9)
MONOCYTES NFR BLD AUTO: 12.3 % — SIGNIFICANT CHANGE UP (ref 2–14)
MONOCYTES NFR BLD: 13 % — HIGH (ref 2–9)
MORPHOLOGY BLD-IMP: SIGNIFICANT CHANGE UP
MYELOCYTES NFR BLD: 1 % — HIGH (ref 0–0)
NEUTROPHIL AB SER-ACNC: 68 % — SIGNIFICANT CHANGE UP (ref 43–77)
NEUTROPHILS # BLD AUTO: 17.18 K/UL — HIGH (ref 1.8–7.4)
NEUTROPHILS NFR BLD AUTO: 79.2 % — HIGH (ref 43–77)
NEUTS BAND # BLD: 2 % — SIGNIFICANT CHANGE UP (ref 0–6)
NRBC # BLD: 0 /100WBC — SIGNIFICANT CHANGE UP
NRBC # FLD: 0 K/UL — SIGNIFICANT CHANGE UP (ref 0–0)
PCO2 BLDA: 29 MMHG — LOW (ref 35–48)
PCO2 BLDA: 33 MMHG — LOW (ref 35–48)
PCO2 BLDA: 38 MMHG — SIGNIFICANT CHANGE UP (ref 35–48)
PCO2 BLDA: 40 MMHG — SIGNIFICANT CHANGE UP (ref 35–48)
PCO2 BLDA: 45 MMHG — SIGNIFICANT CHANGE UP (ref 35–48)
PCO2 BLDV: 38 MMHG — LOW (ref 41–51)
PH BLDA: 7.36 PH — SIGNIFICANT CHANGE UP (ref 7.35–7.45)
PH BLDA: 7.42 PH — SIGNIFICANT CHANGE UP (ref 7.35–7.45)
PH BLDA: 7.45 PH — SIGNIFICANT CHANGE UP (ref 7.35–7.45)
PH BLDA: 7.5 PH — HIGH (ref 7.35–7.45)
PH BLDA: 7.54 PH — HIGH (ref 7.35–7.45)
PH BLDV: 7.45 PH — HIGH (ref 7.32–7.43)
PHOSPHATE SERPL-MCNC: 3.7 MG/DL — SIGNIFICANT CHANGE UP (ref 2.5–4.5)
PLATELET # BLD AUTO: 151 K/UL — SIGNIFICANT CHANGE UP (ref 150–400)
PLATELET COUNT - ESTIMATE: NORMAL — SIGNIFICANT CHANGE UP
PMV BLD: 10.2 FL — SIGNIFICANT CHANGE UP (ref 7–13)
PO2 BLDA: 115 MMHG — HIGH (ref 83–108)
PO2 BLDA: 232 MMHG — HIGH (ref 83–108)
PO2 BLDA: 293 MMHG — HIGH (ref 83–108)
PO2 BLDA: 579 MMHG — HIGH (ref 83–108)
PO2 BLDA: 95 MMHG — SIGNIFICANT CHANGE UP (ref 83–108)
PO2 BLDV: 59 MMHG — HIGH (ref 35–40)
POTASSIUM BLDA-SCNC: 3.3 MMOL/L — LOW (ref 3.4–4.5)
POTASSIUM BLDA-SCNC: 3.5 MMOL/L — SIGNIFICANT CHANGE UP (ref 3.4–4.5)
POTASSIUM BLDA-SCNC: 3.6 MMOL/L — SIGNIFICANT CHANGE UP (ref 3.4–4.5)
POTASSIUM BLDA-SCNC: 3.6 MMOL/L — SIGNIFICANT CHANGE UP (ref 3.4–4.5)
POTASSIUM BLDA-SCNC: 3.8 MMOL/L — SIGNIFICANT CHANGE UP (ref 3.4–4.5)
POTASSIUM SERPL-MCNC: 3.9 MMOL/L — SIGNIFICANT CHANGE UP (ref 3.5–5.3)
POTASSIUM SERPL-SCNC: 3.9 MMOL/L — SIGNIFICANT CHANGE UP (ref 3.5–5.3)
PROT SERPL-MCNC: 6.1 G/DL — SIGNIFICANT CHANGE UP (ref 6–8.3)
RBC # BLD: 4.63 M/UL — SIGNIFICANT CHANGE UP (ref 4.2–5.8)
RBC # FLD: 11.7 % — SIGNIFICANT CHANGE UP (ref 10.3–14.5)
SAO2 % BLDA: 97.3 % — SIGNIFICANT CHANGE UP (ref 95–99)
SAO2 % BLDA: 98.5 % — SIGNIFICANT CHANGE UP (ref 95–99)
SAO2 % BLDA: 99.7 % — HIGH (ref 95–99)
SAO2 % BLDA: 99.8 % — HIGH (ref 95–99)
SAO2 % BLDA: 99.9 % — HIGH (ref 95–99)
SAO2 % BLDV: 91.2 % — HIGH (ref 60–85)
SODIUM BLDA-SCNC: 132 MMOL/L — LOW (ref 136–146)
SODIUM BLDA-SCNC: 133 MMOL/L — LOW (ref 136–146)
SODIUM BLDA-SCNC: 136 MMOL/L — SIGNIFICANT CHANGE UP (ref 136–146)
SODIUM BLDA-SCNC: 137 MMOL/L — SIGNIFICANT CHANGE UP (ref 136–146)
SODIUM BLDA-SCNC: 137 MMOL/L — SIGNIFICANT CHANGE UP (ref 136–146)
SODIUM SERPL-SCNC: 141 MMOL/L — SIGNIFICANT CHANGE UP (ref 135–145)
VARIANT LYMPHS # BLD: 1 % — SIGNIFICANT CHANGE UP
WBC # BLD: 21.71 K/UL — HIGH (ref 3.8–10.5)
WBC # FLD AUTO: 21.71 K/UL — HIGH (ref 3.8–10.5)

## 2019-08-05 PROCEDURE — 93320 DOPPLER ECHO COMPLETE: CPT | Mod: 26,59

## 2019-08-05 PROCEDURE — 99223 1ST HOSP IP/OBS HIGH 75: CPT

## 2019-08-05 PROCEDURE — 33475 REPLACEMENT PULMONARY VALVE: CPT

## 2019-08-05 PROCEDURE — 33475 REPLACEMENT PULMONARY VALVE: CPT | Mod: AS

## 2019-08-05 PROCEDURE — 93315 ECHO TRANSESOPHAGEAL: CPT | Mod: 26,59

## 2019-08-05 PROCEDURE — 93010 ELECTROCARDIOGRAM REPORT: CPT | Mod: 76

## 2019-08-05 PROCEDURE — 71045 X-RAY EXAM CHEST 1 VIEW: CPT | Mod: 26

## 2019-08-05 PROCEDURE — 88305 TISSUE EXAM BY PATHOLOGIST: CPT | Mod: 26

## 2019-08-05 PROCEDURE — 93325 DOPPLER ECHO COLOR FLOW MAPG: CPT | Mod: 26

## 2019-08-05 PROCEDURE — 99291 CRITICAL CARE FIRST HOUR: CPT

## 2019-08-05 RX ORDER — MORPHINE SULFATE 50 MG/1
4 CAPSULE, EXTENDED RELEASE ORAL ONCE
Refills: 0 | Status: DISCONTINUED | OUTPATIENT
Start: 2019-08-05 | End: 2019-08-05

## 2019-08-05 RX ORDER — KETOROLAC TROMETHAMINE 30 MG/ML
30 SYRINGE (ML) INJECTION EVERY 6 HOURS
Refills: 0 | Status: DISCONTINUED | OUTPATIENT
Start: 2019-08-05 | End: 2019-08-07

## 2019-08-05 RX ORDER — MORPHINE SULFATE 50 MG/1
2 CAPSULE, EXTENDED RELEASE ORAL ONCE
Refills: 0 | Status: DISCONTINUED | OUTPATIENT
Start: 2019-08-05 | End: 2019-08-05

## 2019-08-05 RX ORDER — CHOLECALCIFEROL (VITAMIN D3) 125 MCG
2000 CAPSULE ORAL DAILY
Refills: 0 | Status: DISCONTINUED | OUTPATIENT
Start: 2019-08-05 | End: 2019-08-09

## 2019-08-05 RX ORDER — TRIHEXYPHENIDYL HCL 2 MG
5 TABLET ORAL
Refills: 0 | Status: DISCONTINUED | OUTPATIENT
Start: 2019-08-05 | End: 2019-08-09

## 2019-08-05 RX ORDER — CARBAMAZEPINE 200 MG
200 TABLET ORAL DAILY
Refills: 0 | Status: DISCONTINUED | OUTPATIENT
Start: 2019-08-05 | End: 2019-08-09

## 2019-08-05 RX ORDER — MORPHINE SULFATE 50 MG/1
4 CAPSULE, EXTENDED RELEASE ORAL
Refills: 0 | Status: DISCONTINUED | OUTPATIENT
Start: 2019-08-05 | End: 2019-08-07

## 2019-08-05 RX ORDER — ACETAMINOPHEN 500 MG
1000 TABLET ORAL ONCE
Refills: 0 | Status: COMPLETED | OUTPATIENT
Start: 2019-08-05 | End: 2019-08-05

## 2019-08-05 RX ORDER — SODIUM CHLORIDE 9 MG/ML
1000 INJECTION, SOLUTION INTRAVENOUS
Refills: 0 | Status: DISCONTINUED | OUTPATIENT
Start: 2019-08-05 | End: 2019-08-05

## 2019-08-05 RX ORDER — FUROSEMIDE 40 MG
20 TABLET ORAL ONCE
Refills: 0 | Status: DISCONTINUED | OUTPATIENT
Start: 2019-08-05 | End: 2019-08-06

## 2019-08-05 RX ORDER — ONDANSETRON 8 MG/1
8 TABLET, FILM COATED ORAL ONCE
Refills: 0 | Status: COMPLETED | OUTPATIENT
Start: 2019-08-05 | End: 2019-08-05

## 2019-08-05 RX ORDER — DEXTROSE MONOHYDRATE, SODIUM CHLORIDE, AND POTASSIUM CHLORIDE 50; .745; 4.5 G/1000ML; G/1000ML; G/1000ML
1000 INJECTION, SOLUTION INTRAVENOUS
Refills: 0 | Status: DISCONTINUED | OUTPATIENT
Start: 2019-08-05 | End: 2019-08-06

## 2019-08-05 RX ORDER — CEFAZOLIN SODIUM 1 G
1860 VIAL (EA) INJECTION EVERY 8 HOURS
Refills: 0 | Status: COMPLETED | OUTPATIENT
Start: 2019-08-05 | End: 2019-08-07

## 2019-08-05 RX ORDER — ACETAMINOPHEN 500 MG
650 TABLET ORAL EVERY 6 HOURS
Refills: 0 | Status: DISCONTINUED | OUTPATIENT
Start: 2019-08-05 | End: 2019-08-05

## 2019-08-05 RX ORDER — CARBAMAZEPINE 200 MG
400 TABLET ORAL DAILY
Refills: 0 | Status: DISCONTINUED | OUTPATIENT
Start: 2019-08-05 | End: 2019-08-09

## 2019-08-05 RX ADMIN — Medication 30 MILLIGRAM(S): at 15:00

## 2019-08-05 RX ADMIN — ONDANSETRON 16 MILLIGRAM(S): 8 TABLET, FILM COATED ORAL at 17:40

## 2019-08-05 RX ADMIN — MORPHINE SULFATE 2 MILLIGRAM(S): 50 CAPSULE, EXTENDED RELEASE ORAL at 21:15

## 2019-08-05 RX ADMIN — MORPHINE SULFATE 24 MILLIGRAM(S): 50 CAPSULE, EXTENDED RELEASE ORAL at 17:45

## 2019-08-05 RX ADMIN — MORPHINE SULFATE 12 MILLIGRAM(S): 50 CAPSULE, EXTENDED RELEASE ORAL at 21:00

## 2019-08-05 RX ADMIN — MORPHINE SULFATE 24 MILLIGRAM(S): 50 CAPSULE, EXTENDED RELEASE ORAL at 13:00

## 2019-08-05 RX ADMIN — Medication 30 MILLIGRAM(S): at 14:40

## 2019-08-05 RX ADMIN — Medication 400 MILLIGRAM(S): at 21:54

## 2019-08-05 RX ADMIN — MORPHINE SULFATE 4 MILLIGRAM(S): 50 CAPSULE, EXTENDED RELEASE ORAL at 18:00

## 2019-08-05 RX ADMIN — MORPHINE SULFATE 4 MILLIGRAM(S): 50 CAPSULE, EXTENDED RELEASE ORAL at 13:15

## 2019-08-05 RX ADMIN — Medication 30 MILLIGRAM(S): at 20:15

## 2019-08-05 RX ADMIN — Medication 30 MILLIGRAM(S): at 20:00

## 2019-08-05 RX ADMIN — Medication 186 MILLIGRAM(S): at 20:12

## 2019-08-05 RX ADMIN — Medication 1000 MILLIGRAM(S): at 22:45

## 2019-08-05 RX ADMIN — Medication 400 MILLIGRAM(S): at 22:31

## 2019-08-05 NOTE — CONSULT NOTE PEDS - ATTENDING COMMENTS
35 y/o with CP, autism, hx CVA, now s/p pulmonary valve replacement due to free PI, CPB time 40 minutes, returns extubated, on no vasoactive meds, MCT and RCT, no rhythm issues    Vitals as above  Resp: on NC, comfortable  CVS: RRR, nl S1/S2, no murmurs/gallops/rub  Abd: soft, NT/ND  Neuro: sleeping, arousable  Ext: UE contractures, cap refill < 2 sec    35 y/o with CP, autism, hx CVA, now s/p pulmonary valve replacement due to free PI, CPB time 40 minutes, returns extubated, on no vasoactive meds, MCT and RCT, no rhythm issues    ABG, CBC, BMP now  K>3.5, Mg>2, iCa> 1.2  monitor chest tube output  alert cardiology if >2/kg/hr, and CT surg if >5/kg/hr  EKG now  ancef x 48 hrs  pain control with toradol, tylenol  morphine prn  monitor UOP, consider lasix tonight  BMP, CBC, CXR in AM  IVF @ 2/3 x M  clears when awake, then adv diet as tolerated

## 2019-08-05 NOTE — CONSULT NOTE PEDS - SUBJECTIVE AND OBJECTIVE BOX
CHIEF COMPLAINT: *.    HISTORY OF PRESENT ILLNESS: ANABEL DAVIS is a 34y old male with *. (include 4 elements - location, quality, severity, duration, timing/frequency, context, associated symptoms, modifying factors).    REVIEW OF SYSTEMS:  Constitutional - no irritability, no fever, no recent weight loss, no poor weight gain.  Eyes - no conjunctivitis, no discharge.  Ears / Nose / Mouth / Throat - no rhinorrhea, no congestion, no stridor.  Respiratory - no tachypnea, no increased work of breathing, no cough.  Cardiovascular - no chest pain, no palpitations, no diaphoresis, no cyanosis, no syncope.  Gastrointestinal - no change in appetite, no vomiting, no diarrhea.  Genitourinary - no change in urination, no hematuria.  Integumentary - no rash, no jaundice, no pallor, no color change.  Musculoskeletal - no joint swelling, no joint stiffness.  Endocrine - no heat or cold intolerance, no jitteriness, no failure to thrive.  Hematologic / Lymphatic - no easy bruising, no bleeding, no lymphadenopathy.  Neurological - no seizures, no change in activity level, no developmental delay.  All Other Systems - reviewed, negative.    PAST MEDICAL HISTORY:  Birth History - The patient was born at  weeks gestation, with *no pregnancy or  complications.  Medical Problems - The patient has *no significant medical problems.  Hospitalizations - The patient has had *no prior hospitalizations.  Allergies - No Known Allergies    PAST SURGICAL HISTORY:  The patient has had *no prior surgeries.    MEDICATIONS:  ceFAZolin  IV Intermittent - Peds 1860 milliGRAM(s) IV Intermittent every 8 hours  acetaminophen   Oral Tab/Cap - Peds. 650 milliGRAM(s) Oral every 6 hours  carBAMazepine  Oral Tab/Cap - Peds 200 milliGRAM(s) Oral daily  carBAMazepine  Oral Tab/Cap - Peds 400 milliGRAM(s) Oral daily  ketorolac Injection - Peds. 30 milliGRAM(s) IV Push every 6 hours  ondansetron IV Intermittent - Peds 8 milliGRAM(s) IV Intermittent once  trihexyphenidyl Oral Tab/Cap - Peds 5 milliGRAM(s) Oral <User Schedule>  cholecalciferol Oral Tab/Cap - Peds 2000 Unit(s) Oral daily  dextrose 5% + sodium chloride 0.9%. - Pediatric 1000 milliLiter(s) IV Continuous <Continuous>  heparin   Infusion - Pediatric 0.054 Unit(s)/kG/Hr IV Continuous <Continuous>  heparin   Infusion - Pediatric 0.054 Unit(s)/kG/Hr IV Continuous <Continuous>    FAMILY HISTORY:  There is *no history of congenital heart disease, arrhythmias, or sudden cardiac death in family members.    SOCIAL HISTORY:  The patient lives with *mother and father.    PHYSICAL EXAMINATION:  Vital signs - Weight (kg): 55.8 ( @ 06:42)  T(C): 36.6 (19 @ 15:00), Max: 36.6 (19 @ 13:00)  HR: 70 (19 @ 16:00) (68 - 81)  BP: 95/48 (19 @ 16:00) (92/44 - 126/89)  ABP:  (82/50 - 105/58)  RR: 14 (19 @ 16:00) (14 - 24)  SpO2: 98% (19 16:00) (93% - 100%)  CVP(mm Hg):  (0 - 1)  General - non-dysmorphic appearance, well-developed, in no distress.  Skin - no rash, no desquamation, no cyanosis.  Eyes / ENT - no conjunctival injection, sclerae anicteric, external ears & nares normal, mucous membranes moist.  Pulmonary - normal inspiratory effort, no retractions, lungs clear to auscultation bilaterally, no wheezes, no rales.  Cardiovascular - normal rate, regular rhythm, normal S1 & S2, no murmurs, no rubs, no gallops, capillary refill < 2sec, normal pulses.  Gastrointestinal - soft, non-distended, non-tender, no hepatosplenomegaly (liver palpable *cm below right costal margin).  Musculoskeletal - no joint swelling, no clubbing, no edema.  Neurologic / Psychiatric - alert, oriented as age-appropriate, affect appropriate, moves all extremities, normal tone.    LABORATORY TESTS:                          13.2  CBC:   21.71 )-----------( 151   (19 @ 13:09)                          39.0               141   |  104   |  11                 Ca: 8.2    BMP:   ----------------------------< 138    M.7   (19 @ 13:09)             3.9    |  25    | 0.64               Ph: 3.7      LFT:     TPro: 6.1 / Alb: 4.2 / TBili: 0.6 / DBili: x / AST: 21 / ALT: 18 / AlkPhos: 59   (19 @ 13:09)        ABG:   pH: 7.42 / pCO2: 40 / pO2: 115 / HCO3: 26 / Base Excess: 1.3 / SaO2: 98.5 / Lactate: 0.9 / iCa: 1.13   (19 @ 15:45)      VBG:   pH: 7.45 / pCO2: 38 / pO2: 59 / HCO3: 26 / Base Excess: 2.2 / SaO2: 91.2   (19 @ 09:47)    IMAGING STUDIES:  Electrocardiogram - (*date)     Telemetry - (*dates) normal sinus rhythm, no ectopy, no arrhythmias.    Chest x-ray - (*date)     Echocardiogram - (*date)     Other - (*date) CHIEF COMPLAINT: Pulmonary valve replacement.     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  He has complete heart block and has a right sided transvenous, dual chamber pacing system inserted through the right subclavian vein and is 100% ventricular paced with atrial sensing. He has been noted for some time to have severe pulmonary regurgitation and an enlarging right ventricle. He underwent cardiac catheterization which showed normal pulmonary artery pressure and severe pulmonary regurgitation (probably due to pulmonary debanding) and is currently admitted for pulmonary valve replacement #POD 0. A 27 mm bovine pericardial valve was placed in the pulmonary position and total bypass time was 40 minutes.  Prior to his surgery denis has been asymptomatic according to his parents. His activity level is unchanged and they do not notice any change in his level of activity. He walks up and down the stairs in the house without any shortness of breath. He has no complaints of chest pain, palpitations, dizziness, exercise intolerance or syncope.       REVIEW OF SYSTEMS:  Constitutional - no irritability, no fever, no recent weight loss, no poor weight gain.  Eyes - no conjunctivitis, no discharge.  Ears / Nose / Mouth / Throat - no rhinorrhea, no congestion, no stridor.  Respiratory - no tachypnea, no increased work of breathing, no cough.  Cardiovascular - no chest pain, no palpitations, no diaphoresis, no cyanosis, no syncope.  Gastrointestinal - no change in appetite, no vomiting, no diarrhea.  Genitourinary - no change in urination, no hematuria.  Integumentary - no rash, no jaundice, no pallor, no color change.  Musculoskeletal - no joint swelling, no joint stiffness.  Endocrine - no heat or cold intolerance, no jitteriness, no failure to thrive.  Hematologic / Lymphatic - no easy bruising, no bleeding, no lymphadenopathy.  Neurological - no seizures, no change in activity level, no developmental delay.  All Other Systems - reviewed, negative.    Prior Cardiac/Interventional History:  Denis underwent coarctation repair using a subclavian flap technique with pulmonary artery banding in infancy by Dr. Alanis at [facility]. He subsequently underwent takedown of the pulmonary artery band and patch closure of the subaortic ventricular septal defect. Denis later required resection of a subaortic membrane. In , he underwent balloon angioplasty of her recurrent coarctation in the catheterization laboratory. In 2005, Denis underwent transvenous dual-chamber pacemaker placement for high-grade atrioventricular block. In 2013, Denis underwent pacemaker generator change with Dr. Bassam Garcia at [facility]. Denis also has a past medical history of cerebral palsy and developmental delay as well as a seizure disorder which has been controlled with Tegretol.      OR (Dr. Alanis): Coarctation repair and pulmonary artery banding (s/p arrest on DOL 1)   OR (Dr. Alanis): PA band takedown, patch closure of subaortic VSD   OR (Dr. Alanis): Resection of subaortic membrane  1997 CATH (Dr. Huerta): Balloon angioplasty for recurrent coarctation  2005 OR (Dr. Garcia): Left sided dual chamber transvenous pacemaker placement for high grade AVB   2005: Pacemaker pocket infection- 6-wk abx for MSSA bacteremia & presumed endocarditis (negative TTE)  3/7/2005 OR (Dr. Garcia): ): New right sided dual chamber transvenous pacemaker placement   19 CATH (Dr. Forde): Normal CO (3.6 L/min), RUPV draining to RSVC (just below azygous entry) w/ Qp:Qs 1.7-1.9. Free PI w/ normal RVEDp (7mmHg), dilated MPA and unobstructed branch PAs. Unobstructed aortic arch (PSEG 8mmHg) with mild hypoplasia and tortuosity of transverse arch. Patent LSVC to CS (smaller than RSVC and no bridging vein). No atrial communication.   Allergies - No Known Allergies      MEDICATIONS:  ceFAZolin  IV Intermittent - Peds 1860 milliGRAM(s) IV Intermittent every 8 hours  acetaminophen   Oral Tab/Cap - Peds. 650 milliGRAM(s) Oral every 6 hours  carBAMazepine  Oral Tab/Cap - Peds 200 milliGRAM(s) Oral daily  carBAMazepine  Oral Tab/Cap - Peds 400 milliGRAM(s) Oral daily  ketorolac Injection - Peds. 30 milliGRAM(s) IV Push every 6 hours  ondansetron IV Intermittent - Peds 8 milliGRAM(s) IV Intermittent once  trihexyphenidyl Oral Tab/Cap - Peds 5 milliGRAM(s) Oral <User Schedule>  cholecalciferol Oral Tab/Cap - Peds 2000 Unit(s) Oral daily  dextrose 5% + sodium chloride 0.9%. - Pediatric 1000 milliLiter(s) IV Continuous <Continuous>  heparin   Infusion - Pediatric 0.054 Unit(s)/kG/Hr IV Continuous <Continuous>  heparin   Infusion - Pediatric 0.054 Unit(s)/kG/Hr IV Continuous <Continuous>    FAMILY HISTORY:  There is no history of congenital heart disease, arrhythmias, or sudden cardiac death in family members.    SOCIAL HISTORY:  The patient lives with mother and father.    PHYSICAL EXAMINATION:  Vital signs - Weight (kg): 55.8 ( @ 06:42)  T(C): 36.6 (19 @ 15:00), Max: 36.6 (19 @ 13:00)  HR: 70 (19 @ 16:00) (68 - 81)  BP: 95/48 (19 @ 16:00) (92/44 - 126/89)  ABP:  (82/50 - 105/58)  RR: 14 (19 @ 16:00) (14 - 24)  SpO2: 98% (19 @ 16:00) (93% - 100%)  CVP(mm Hg):  (0 - 1)  General - non-dysmorphic appearance, well-developed, in no distress.  Skin - no rash, no desquamation, no cyanosis.  Eyes / ENT - no conjunctival injection, sclerae anicteric, external ears & nares normal, mucous membranes moist.  Pulmonary - normal inspiratory effort, no retractions, lungs clear to auscultation bilaterally, no wheezes, no rales.  Cardiovascular - normal rate, regular rhythm, normal S1 & S2, no murmurs, no rubs, no gallops, capillary refill < 2sec, normal pulses.  Gastrointestinal - soft, non-distended, non-tender, no hepatosplenomegaly (liver palpable *cm below right costal margin).  Musculoskeletal - no joint swelling, no clubbing, no edema.  Neurologic / Psychiatric - alert, oriented as age-appropriate, affect appropriate, moves all extremities, normal tone.    LABORATORY TESTS:                          13.2  CBC:   21.71 )-----------( 151   (19 @ 13:09)                          39.0               141   |  104   |  11                 Ca: 8.2    BMP:   ----------------------------< 138    M.7   (19 @ 13:09)             3.9    |  25    | 0.64               Ph: 3.7      LFT:     TPro: 6.1 / Alb: 4.2 / TBili: 0.6 / DBili: x / AST: 21 / ALT: 18 / AlkPhos: 59   (19 @ 13:09)        ABG:   pH: 7.42 / pCO2: 40 / pO2: 115 / HCO3: 26 / Base Excess: 1.3 / SaO2: 98.5 / Lactate: 0.9 / iCa: 1.13   (19 @ 15:45)      VBG:   pH: 7.45 / pCO2: 38 / pO2: 59 / HCO3: 26 / Base Excess: 2.2 / SaO2: 91.2   (19 @ 09:47)    IMAGING STUDIES:  Electrocardiogram - (*date)     Telemetry - (*dates) normal sinus rhythm, no ectopy, no arrhythmias.    Chest x-ray - (*date)     Echocardiogram - (*date)     Other - (*date) CHIEF COMPLAINT: Pulmonary valve replacement.     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  He has complete heart block and has a right sided transvenous, dual chamber pacing system inserted through the right subclavian vein and is 100% ventricular paced with atrial sensing. He has been noted for some time to have severe pulmonary regurgitation and an enlarging right ventricle. He underwent cardiac catheterization which showed normal pulmonary artery pressure and severe pulmonary regurgitation (probably due to pulmonary debanding) and is currently admitted for pulmonary valve replacement #POD 0. A 27 mm bovine pericardial valve was placed in the pulmonary position and total bypass time was 40 minutes.  Prior to his surgery denis has been asymptomatic according to his parents. His activity level is unchanged and they do not notice any change in his level of activity. He walks up and down the stairs in the house without any shortness of breath. He has no complaints of chest pain, palpitations, dizziness, exercise intolerance or syncope.       REVIEW OF SYSTEMS:  Constitutional - no irritability, no fever, no recent weight loss, no poor weight gain.  Eyes - no conjunctivitis, no discharge.  Ears / Nose / Mouth / Throat - no rhinorrhea, no congestion, no stridor.  Respiratory - no tachypnea, no increased work of breathing, no cough.  Cardiovascular - no chest pain, no palpitations, no diaphoresis, no cyanosis, no syncope.  Gastrointestinal - no change in appetite, no vomiting, no diarrhea.  Genitourinary - no change in urination, no hematuria.  Integumentary - no rash, no jaundice, no pallor, no color change.  Musculoskeletal - no joint swelling, no joint stiffness.  Endocrine - no heat or cold intolerance, no jitteriness, no failure to thrive.  Hematologic / Lymphatic - no easy bruising, no bleeding, no lymphadenopathy.  Neurological - no seizures, no change in activity level, no developmental delay.  All Other Systems - reviewed, negative.    Prior Cardiac/Interventional History:  Denis underwent coarctation repair using a subclavian flap technique with pulmonary artery banding in infancy by Dr. Alanis at [facility]. He subsequently underwent takedown of the pulmonary artery band and patch closure of the subaortic ventricular septal defect. Denis later required resection of a subaortic membrane. In , he underwent balloon angioplasty of her recurrent coarctation in the catheterization laboratory. In 2005, Denis underwent transvenous dual-chamber pacemaker placement for high-grade atrioventricular block. In 2013, Denis underwent pacemaker generator change with Dr. Bassam Garcia at [facility]. Denis also has a past medical history of cerebral palsy and developmental delay as well as a seizure disorder which has been controlled with Tegretol.      OR (Dr. Alanis): Coarctation repair and pulmonary artery banding (s/p arrest on DOL 1)   OR (Dr. Alanis): PA band takedown, patch closure of subaortic VSD   OR (Dr. Alanis): Resection of subaortic membrane  1997 CATH (Dr. Huerta): Balloon angioplasty for recurrent coarctation  2005 OR (Dr. Garcia): Left sided dual chamber transvenous pacemaker placement for high grade AVB   2005: Pacemaker pocket infection- 6-wk abx for MSSA bacteremia & presumed endocarditis (negative TTE)  3/7/2005 OR (Dr. Garcia): ): New right sided dual chamber transvenous pacemaker placement   19 CATH (Dr. Forde): Normal CO (3.6 L/min), RUPV draining to RSVC (just below azygous entry) w/ Qp:Qs 1.7-1.9. Free PI w/ normal RVEDp (7mmHg), dilated MPA and unobstructed branch PAs. Unobstructed aortic arch (PSEG 8mmHg) with mild hypoplasia and tortuosity of transverse arch. Patent LSVC to CS (smaller than RSVC and no bridging vein). No atrial communication.   Allergies - No Known Allergies      MEDICATIONS:  ceFAZolin  IV Intermittent - Peds 1860 milliGRAM(s) IV Intermittent every 8 hours  acetaminophen   Oral Tab/Cap - Peds. 650 milliGRAM(s) Oral every 6 hours  carBAMazepine  Oral Tab/Cap - Peds 200 milliGRAM(s) Oral daily  carBAMazepine  Oral Tab/Cap - Peds 400 milliGRAM(s) Oral daily  ketorolac Injection - Peds. 30 milliGRAM(s) IV Push every 6 hours  ondansetron IV Intermittent - Peds 8 milliGRAM(s) IV Intermittent once  trihexyphenidyl Oral Tab/Cap - Peds 5 milliGRAM(s) Oral <User Schedule>  cholecalciferol Oral Tab/Cap - Peds 2000 Unit(s) Oral daily  dextrose 5% + sodium chloride 0.9%. - Pediatric 1000 milliLiter(s) IV Continuous <Continuous>  heparin   Infusion - Pediatric 0.054 Unit(s)/kG/Hr IV Continuous <Continuous>  heparin   Infusion - Pediatric 0.054 Unit(s)/kG/Hr IV Continuous <Continuous>    FAMILY HISTORY:  There is no history of congenital heart disease, arrhythmias, or sudden cardiac death in family members.    SOCIAL HISTORY:  The patient lives with mother and father.    PHYSICAL EXAMINATION:  Vital signs - Weight (kg): 55.8 ( @ 06:42)  T(C): 36.6 (19 @ 15:00), Max: 36.6 (19 @ 13:00)  HR: 70 (19 @ 16:00) (68 - 81)  BP: 95/48 (19 @ 16:00) (92/44 - 126/89)  ABP:  (82/50 - 105/58)  RR: 14 (19 @ 16:00) (14 - 24)  SpO2: 98% (19 @ 16:00) (93% - 100%)  CVP(mm Hg):  (0 - 1)  General - non-dysmorphic appearance, well-developed, in no distress.  Skin - no rash, no desquamation, no cyanosis.  Eyes / ENT - no conjunctival injection, sclerae anicteric, external ears & nares normal, mucous membranes moist.  Pulmonary - normal inspiratory effort, no retractions, lungs clear to auscultation bilaterally, no wheezes, no rales. R chest tube in place and central packing.   Cardiovascular - normal rate, regular rhythm, normal S1 & S2, no murmurs, no rubs, no gallops, capillary refill < 2sec, normal pulses.  Gastrointestinal - soft, non-distended, non-tender, no hepatosplenomegaly  Musculoskeletal - no joint swelling, no clubbing, no edema.  Neurologic / Psychiatric - alert, oriented as age-appropriate, affect appropriate, moves all extremities, normal tone.    LABORATORY TESTS:                          13.2  CBC:   21.71 )-----------( 151   (19 @ 13:09)                          39.0               141   |  104   |  11                 Ca: 8.2    BMP:   ----------------------------< 138    M.7   (19 @ 13:09)             3.9    |  25    | 0.64               Ph: 3.7      LFT:     TPro: 6.1 / Alb: 4.2 / TBili: 0.6 / DBili: x / AST: 21 / ALT: 18 / AlkPhos: 59   (19 @ 13:09)        ABG:   pH: 7.42 / pCO2: 40 / pO2: 115 / HCO3: 26 / Base Excess: 1.3 / SaO2: 98.5 / Lactate: 0.9 / iCa: 1.13   (19 @ 15:45)      VBG:   pH: 7.45 / pCO2: 38 / pO2: 59 / HCO3: 26 / Base Excess: 2.2 / SaO2: 91.2   (19 @ 09:47)    IMAGING STUDIES:  Electrocardiogram - 19  Atrial sensing, ventricular sensing with 100% capture    Telemetry - normal sinus rhythm, no ectopy, no arrhythmias (Atrial sensing, ventricular sensing with 100% capture)    Chest x-ray -   Xray Chest 1 View- PORTABLE-Routine (19 @ 12:33) >  MPRESSION: The patient is status post pulmonic valve replacement. A   right cardiac device is noted as on the prior study. There is a   mediastinal drainage catheter. There is a left IJ line with the tip   within a left-sided superior vena cava.    The heart size appears enlarged. There is retrocardiac opacity likely due   to atelectasis. There may be a tiny right pneumothorax. Right-sided   pigtail pleural catheter is noted. Follow-up is recommended.        Echocardiogram:   Echocardiogram, Pediatric (19)   1. History of subaortic VSD and coarctation of aorta s/p PA band and coarctation repair by subclavian flap method. s/p PA band take down and VSD patch closure. Subaortic membrane s/p resection. Subsequently balloon dilation of recurrent coarctation. Severe pulmonary insufficiency.   2. 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   3. No evidence of neopulmonary stenosis.   4. No evidence of neopulmonary regurgitation.   5. No residual left ventricular outflow tract obstruction.   6. Mild aortic valve regurgitation.   7. Dyskinesia of the interventricular septum.   8. Dilated right ventricle.   9. Mild global hypokinesia of the right ventricle.  10. Mild global hypokinesia of the left ventricle.      19 CATH (Dr. Forde): Normal CO (3.6 L/min), RUPV draining to RSVC (just below azygous entry) w/ Qp:Qs 1.7-1.9. Free PI w/ normal RVEDp (7mmHg), dilated MPA and unobstructed branch PAs. Unobstructed aortic arch (PSEG 8mmHg) with mild hypoplasia and tortuosity of transverse arch. Patent LSVC to CS (smaller than RSVC and no bridging vein). No atrial communication. CHIEF COMPLAINT: Pulmonary valve replacement.     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  He has complete heart block and has a right sided transvenous, dual chamber pacing system inserted through the right subclavian vein and is 100% ventricular paced with atrial sensing. He has been noted for some time to have severe pulmonary regurgitation and an enlarging right ventricle. He underwent cardiac catheterization which showed normal pulmonary artery pressure and severe pulmonary regurgitation (probably due to pulmonary debanding) and is currently admitted for pulmonary valve replacement #POD 0 (27 mm bovine valve). A 27 mm bovine pericardial valve was placed in the pulmonary position and total bypass time was 40 minutes.  Prior to his surgery denis has been asymptomatic according to his parents. His activity level is unchanged and they do not notice any change in his level of activity. He walks up and down the stairs in the house without any shortness of breath. He has no complaints of chest pain, palpitations, dizziness, exercise intolerance or syncope.       REVIEW OF SYSTEMS:  Constitutional - no irritability, no fever, no recent weight loss, no poor weight gain.  Eyes - no conjunctivitis, no discharge.  Ears / Nose / Mouth / Throat - no rhinorrhea, no congestion, no stridor.  Respiratory - no tachypnea, no increased work of breathing, no cough.  Cardiovascular - no chest pain, no palpitations, no diaphoresis, no cyanosis, no syncope.  Gastrointestinal - no change in appetite, no vomiting, no diarrhea.  Genitourinary - no change in urination, no hematuria.  Integumentary - no rash, no jaundice, no pallor, no color change.  Musculoskeletal - no joint swelling, no joint stiffness.  Endocrine - no heat or cold intolerance, no jitteriness, no failure to thrive.  Hematologic / Lymphatic - no easy bruising, no bleeding, no lymphadenopathy.  Neurological - no seizures, no change in activity level, no developmental delay.  All Other Systems - reviewed, negative.    Prior Cardiac/Interventional History:  Denis underwent coarctation repair using a subclavian flap technique with pulmonary artery banding in infancy by Dr. Alanis at [facility]. He subsequently underwent takedown of the pulmonary artery band and patch closure of the subaortic ventricular septal defect. Denis later required resection of a subaortic membrane. In , he underwent balloon angioplasty of her recurrent coarctation in the catheterization laboratory. In 2005, Denis underwent transvenous dual-chamber pacemaker placement for high-grade atrioventricular block. In 2013, Denis underwent pacemaker generator change with Dr. Bassam Garcia at [facility]. Denis also has a past medical history of cerebral palsy and developmental delay as well as a seizure disorder which has been controlled with Tegretol.      OR (Dr. Alanis): Coarctation repair and pulmonary artery banding (s/p arrest on DOL 1)   OR (Dr. Alanis): PA band takedown, patch closure of subaortic VSD   OR (Dr. Alanis): Resection of subaortic membrane  1997 CATH (Dr. Huerta): Balloon angioplasty for recurrent coarctation  2005 OR (Dr. Garcia): Left sided dual chamber transvenous pacemaker placement for high grade AVB   2005: Pacemaker pocket infection- 6-wk abx for MSSA bacteremia & presumed endocarditis (negative TTE)  3/7/2005 OR (Dr. Garcia): ): New right sided dual chamber transvenous pacemaker placement   19 CATH (Dr. Forde): Normal CO (3.6 L/min), RUPV draining to RSVC (just below azygous entry) w/ Qp:Qs 1.7-1.9. Free PI w/ normal RVEDp (7mmHg), dilated MPA and unobstructed branch PAs. Unobstructed aortic arch (PSEG 8mmHg) with mild hypoplasia and tortuosity of transverse arch. Patent LSVC to CS (smaller than RSVC and no bridging vein). No atrial communication.   Allergies - No Known Allergies      MEDICATIONS:  ceFAZolin  IV Intermittent - Peds 1860 milliGRAM(s) IV Intermittent every 8 hours  acetaminophen   Oral Tab/Cap - Peds. 650 milliGRAM(s) Oral every 6 hours  carBAMazepine  Oral Tab/Cap - Peds 200 milliGRAM(s) Oral daily  carBAMazepine  Oral Tab/Cap - Peds 400 milliGRAM(s) Oral daily  ketorolac Injection - Peds. 30 milliGRAM(s) IV Push every 6 hours  ondansetron IV Intermittent - Peds 8 milliGRAM(s) IV Intermittent once  trihexyphenidyl Oral Tab/Cap - Peds 5 milliGRAM(s) Oral <User Schedule>  cholecalciferol Oral Tab/Cap - Peds 2000 Unit(s) Oral daily  dextrose 5% + sodium chloride 0.9%. - Pediatric 1000 milliLiter(s) IV Continuous <Continuous>  heparin   Infusion - Pediatric 0.054 Unit(s)/kG/Hr IV Continuous <Continuous>  heparin   Infusion - Pediatric 0.054 Unit(s)/kG/Hr IV Continuous <Continuous>    FAMILY HISTORY:  There is no history of congenital heart disease, arrhythmias, or sudden cardiac death in family members.    SOCIAL HISTORY:  The patient lives with mother and father.    PHYSICAL EXAMINATION:  Vital signs - Weight (kg): 55.8 ( @ 06:42)  T(C): 36.6 (19 @ 15:00), Max: 36.6 (19 @ 13:00)  HR: 70 (19 @ 16:00) (68 - 81)  BP: 95/48 (19 @ 16:00) (92/44 - 126/89)  ABP:  (82/50 - 105/58)  RR: 14 (19 @ 16:00) (14 - 24)  SpO2: 98% (19 @ 16:00) (93% - 100%)  CVP(mm Hg):  (0 - 1)  General - non-dysmorphic appearance, well-developed, in no distress.  Skin - no rash, no desquamation, no cyanosis.  Eyes / ENT - no conjunctival injection, sclerae anicteric, external ears & nares normal, mucous membranes moist.  Pulmonary - normal inspiratory effort, no retractions, lungs clear to auscultation bilaterally, no wheezes, no rales. R chest tube in place and central packing.   Cardiovascular - normal rate, regular rhythm, normal S1 & S2, no murmurs, no rubs, no gallops, capillary refill < 2sec, normal pulses.  Gastrointestinal - soft, non-distended, non-tender, no hepatosplenomegaly  Musculoskeletal - no joint swelling, no clubbing, no edema.  Neurologic / Psychiatric - alert, oriented as age-appropriate, affect appropriate, moves all extremities, normal tone.    LABORATORY TESTS:                          13.2  CBC:   21.71 )-----------( 151   (19 @ 13:09)                          39.0               141   |  104   |  11                 Ca: 8.2    BMP:   ----------------------------< 138    M.7   (19 @ 13:09)             3.9    |  25    | 0.64               Ph: 3.7      LFT:     TPro: 6.1 / Alb: 4.2 / TBili: 0.6 / DBili: x / AST: 21 / ALT: 18 / AlkPhos: 59   (19 @ 13:09)        ABG:   pH: 7.42 / pCO2: 40 / pO2: 115 / HCO3: 26 / Base Excess: 1.3 / SaO2: 98.5 / Lactate: 0.9 / iCa: 1.13   (19 @ 15:45)      VBG:   pH: 7.45 / pCO2: 38 / pO2: 59 / HCO3: 26 / Base Excess: 2.2 / SaO2: 91.2   (19 @ 09:47)    IMAGING STUDIES:  Electrocardiogram - 19  Atrial sensing, ventricular sensing with 100% capture    Telemetry - normal sinus rhythm, no ectopy, no arrhythmias (Atrial sensing, ventricular sensing with 100% capture)    Chest x-ray -   Xray Chest 1 View- PORTABLE-Routine (19 @ 12:33) >  MPRESSION: The patient is status post pulmonic valve replacement. A   right cardiac device is noted as on the prior study. There is a   mediastinal drainage catheter. There is a left IJ line with the tip   within a left-sided superior vena cava.    The heart size appears enlarged. There is retrocardiac opacity likely due   to atelectasis. There may be a tiny right pneumothorax. Right-sided   pigtail pleural catheter is noted. Follow-up is recommended.        Echocardiogram:   Echocardiogram, Pediatric (19)   1. History of subaortic VSD and coarctation of aorta s/p PA band and coarctation repair by subclavian flap method. s/p PA band take down and VSD patch closure. Subaortic membrane s/p resection. Subsequently balloon dilation of recurrent coarctation. Severe pulmonary insufficiency.   2. 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   3. No evidence of neopulmonary stenosis.   4. No evidence of neopulmonary regurgitation.   5. No residual left ventricular outflow tract obstruction.   6. Mild aortic valve regurgitation.   7. Dyskinesia of the interventricular septum.   8. Dilated right ventricle.   9. Mild global hypokinesia of the right ventricle.  10. Mild global hypokinesia of the left ventricle.      19 CATH (Dr. Forde): Normal CO (3.6 L/min), RUPV draining to RSVC (just below azygous entry) w/ Qp:Qs 1.7-1.9. Free PI w/ normal RVEDp (7mmHg), dilated MPA and unobstructed branch PAs. Unobstructed aortic arch (PSEG 8mmHg) with mild hypoplasia and tortuosity of transverse arch. Patent LSVC to CS (smaller than RSVC and no bridging vein). No atrial communication.

## 2019-08-05 NOTE — H&P PEDIATRIC - ASSESSMENT
35yo M with PMH significant for CP, dystonia, h/o CVA, seizure d/o, intellectual disability and developmental delays s/p surgical repair of subaortic VSD and aortic coarctation using a subclavian flap technique with PA banding, subsequent takedown of PA banding and patch closure of subaortic VSD. Pt also underwent resection of DANIELLE. Balloon angioplasty was necessary for recurrence of coarctation. In addition he required dual chamber pacemaker placement for high grade AV block. He is 100% ventricular paced with atrial sensing. His most recent pacemaker interrogation was on 7/10/19 (report attached) and his last pacemaker generator change was in 2013.     Denis is a 35 yo male with a complex cardiac history including repair of subaortic VSD and aortic coarctation who is presenting s/p pulmonary valve replacement   He is s/p pulmonary valve replacement with Dr. Crowley. A 27 mm bovine pericardial valve was placed in the pulmonary position, total bypass time was 40 minutes. He did not require any transfusions. Denis is a 33 yo male with a complex cardiac history including repair of subaortic VSD and aortic coarctation, 100% ventricular paced with atrial sensing who is presenting s/p pulmonary valve replacement, total bypass time 40 minutes with no complications.     1. Cardiac  - Monitor ABG q2  - EKG post op    2. Resp  - currently on NC, wean as needed  - monitor sats, goals >92%

## 2019-08-05 NOTE — H&P PEDIATRIC - NSHPREVIEWOFSYSTEMS_GEN_ALL_CORE
Gen: No fever, normal appetite  Eyes: No eye irritation or discharge  ENT: No earpain, congestion, sore throat  Resp: No cough or trouble breathing  Cardiovascular: No chest pain or palpitation  Gastroenteric: No nausea/vomiting, diarrhea, constipation  MS: No joint or muscle pain  Skin: No rashes  Neuro: No headache  Remainder as per the HPI

## 2019-08-05 NOTE — H&P PEDIATRIC - NSHPPHYSICALEXAM_GEN_ALL_CORE
Vital Signs Last 24 Hrs  T(C): 36.6 (05 Aug 2019 15:00), Max: 36.6 (05 Aug 2019 13:00)  T(F): 97.8 (05 Aug 2019 15:00), Max: 97.8 (05 Aug 2019 13:00)  HR: 70 (05 Aug 2019 16:00) (68 - 81)  BP: 95/48 (05 Aug 2019 16:00) (92/44 - 126/89)  BP(mean): 60 (05 Aug 2019 16:00) (50 - 94)  RR: 14 (05 Aug 2019 16:00) (14 - 24)  SpO2: 98% (05 Aug 2019 16:00) (93% - 100%)    General: sleeping, responds to touch  Cardiac: S1, S2. Regular rate and rhythm   Chest: incision site noted centrally with packaging. R chest tube in place, draining serosanguinous fluid   Pulm: Clear to auscultation  Abd: soft, non tender

## 2019-08-05 NOTE — H&P PEDIATRIC - HISTORY OF PRESENT ILLNESS
35yo M with PMH significant for CP, dystonia, h/o CVA, seizure d/o, intellectual disability and developmental delays. He is s/p surgical repair of subaortic VSD and aortic coarctation using a subclavian flap technique with PA banding, subsequent takedown of PA banding and patch closure of subaortic VSD. Pt also underwent resection of DANIELLE. Balloon angioplasty was necessary for recurrence of coarctation. In addition he required dual chamber pacemaker placement for high grade AV block. He is 100% ventricular paced with atrial sensing. His most recent pacemaker interrogation was on 7/10/19 (report attached) and his last pacemaker generator change was in 2013.     He is s/p pulmonary valve replacement with Dr. Crowley. A 27 mm bovine pericardial valve was placed in the pulmonary position, total bypass time was 40 minutes. He did not require any transfusions.

## 2019-08-05 NOTE — H&P PEDIATRIC - NSHPLABSRESULTS_GEN_ALL_CORE
08-05    141  |  104  |  11  ----------------------------<  138<H>  3.9   |  25  |  0.64    Ca    8.2<L>      05 Aug 2019 13:09  Phos  3.7     08-05  Mg     1.7     08-05    TPro  6.1  /  Alb  4.2  /  TBili  0.6  /  DBili  x   /  AST  21  /  ALT  18  /  AlkPhos  59  08-05                            13.2   21.71 )-----------( 151      ( 05 Aug 2019 13:09 )             39.0       Blood Gas Arterial - Lytes,Hgb,iCa,Lact (08.05.19 @ 15:45)    pH, Arterial: 7.42 pH    pCO2, Arterial: 40 mmHg    pO2, Arterial: 115 mmHg    HCO3, Arterial: 26 mmol/L    Base Excess, Arterial: 1.3: BASE EXCESS: REFERENCE RANGE = 0 (+/-) 2 mmol/l mmol/L    Oxygen Saturation, Arterial: 98.5 %    Blood Gas Arterial - Sodium: 136 mmol/L    Blood Gas Arterial - Potassium: 3.6 mmol/L    Blood Gas Arterial - Glucose: 122 mg/dL    Blood Gas Arterial - Hemoglobin: 12.6 g/dL    Blood Gas Arterial - Hematocrit: 38.8 %    Blood Gas Arterial - Calcium, Ionized: 1.13 mmol/L    Blood Gas Arterial - Lactate: 0.9: Please note updated reference range. mmol/L

## 2019-08-05 NOTE — PROGRESS NOTE PEDS - SUBJECTIVE AND OBJECTIVE BOX
Interval/Overnight Events:  33 y/o with CP, autism, hx CVA, now s/p pulmonary valve replacement due to free PI,  CPB time 40 minutes, returns extubated, on no vasoactive meds, MCT and RCT(for pneumothorax), he has underlying heart block for which he has an epicardial DDD  pacemaker, no additional rhythm issues intraoperatively    VITAL SIGNS:  T(C): 36.6 (08-05-19 @ 20:00), Max: 36.8 (08-05-19 @ 18:00)  HR: 78 (08-05-19 @ 22:00) (68 - 81)  BP: 122/57 (08-05-19 @ 20:00) (92/44 - 126/89)  ABP: 91/49 (08-05-19 @ 22:00) (82/50 - 106/57)  ABP(mean): 65 (08-05-19 @ 22:00) (61 - 76)  RR: 14 (08-05-19 @ 22:00) (14 - 24)  SpO2: 92% (08-05-19 @ 22:00) (92% - 100%)  CVP(mm Hg): -3 (08-05-19 @ 22:00) (-3 - 10)    ==================================RESPIRATORY===================================  returns from OR on 100% oxygen via NC  VBG - ( 05 Aug 2019 09:47 )  pH: 7.45  /  pCO2: 38    /  pO2: 59    / HCO3: 26    / Base Excess: 2.2   /  SvO2: 91.2  / Lactate: x      ABG - ( 05 Aug 2019 15:45 )  pH: 7.42  /  pCO2: 40    /  pO2: 115   / HCO3: 26    / Base Excess: 1.3   /  SaO2: 98.5  / Lactate: 0.9      Respiratory Medications:    [ ] Extubation Readiness Assessed  Comments:        ================================CARDIOVASCULAR================================  [ ] NIRS:  Cardiovascular Medications:      Cardiac Rhythm:	[ ] NSR		[ x] Other: being paced DDD   Comments:    ===========================HEMATOLOGIC/ONCOLOGIC=============================                                            13.2                  Neurophils% (auto):   79.2   (08-05 @ 13:09):    21.71)-----------(151          Lymphocytes% (auto):  7.6                                           39.0                   Eosinphils% (auto):   0.0      Manual%: Neutrophils 68.0 ; Lymphocytes 13.0 ; Eosinophils 1.0  ; Bands%: 2.0  ; Blasts x          Transfusions:	[ ] PRBC	[ ] Platelets	[ ] FFP		[ ] Cryoprecipitate    Hematologic/Oncologic Medications:  heparin   Infusion - Pediatric 0.054 Unit(s)/kG/Hr IV Continuous <Continuous>  heparin   Infusion - Pediatric 0.054 Unit(s)/kG/Hr IV Continuous <Continuous>    [ ] DVT Prophylaxis:  Comments:    ===============================INFECTIOUS DISEASE===============================  Antimicrobials/Immunologic Medications:  ceFAZolin  IV Intermittent - Peds 1860 milliGRAM(s) IV Intermittent every 8 hours    RECENT CULTURES:        =========================FLUIDS/ELECTROLYTES/NUTRITION==========================  I&O's Summary    05 Aug 2019 07:01  -  05 Aug 2019 22:37  --------------------------------------------------------  IN: 548 mL / OUT: 908 mL / NET: -360 mL      Daily Weight Gm: 24107 (05 Aug 2019 06:42)  08-05    141  |  104  |  11  ----------------------------<  138<H>  3.9   |  25  |  0.64    Ca    8.2<L>      05 Aug 2019 13:09  Phos  3.7     08-05  Mg     1.7     08-05    TPro  6.1  /  Alb  4.2  /  TBili  0.6  /  DBili  x   /  AST  21  /  ALT  18  /  AlkPhos  59  08-05      Diet:	[ ] Regular	[ ] Soft		[ ] Clears	[x ] NPO  .	[ ] Other:  .	[ ] NGT		[ ] NDT		[ ] GT		[ ] GJT    Gastrointestinal Medications:  cholecalciferol Oral Tab/Cap - Peds 2000 Unit(s) Oral daily  dextrose 5% + sodium chloride 0.9% with potassium chloride 20 mEq/L. - Pediatric 1000 milliLiter(s) IV Continuous <Continuous>    Comments:    =================================NEUROLOGY====================================  [ ] SBS:		[ ] LULY-1:	[ ] CAPD:  [x ] Adequacy of sedation and pain control has been assessed and adjusted    Neurologic Medications:  carBAMazepine  Oral Tab/Cap - Peds 200 milliGRAM(s) Oral daily  carBAMazepine  Oral Tab/Cap - Peds 400 milliGRAM(s) Oral daily  ketorolac Injection - Peds. 30 milliGRAM(s) IV Push every 6 hours  morphine  IV Intermittent - Peds 4 milliGRAM(s) IV Intermittent every 3 hours PRN  trihexyphenidyl Oral Tab/Cap - Peds 5 milliGRAM(s) Oral <User Schedule>    Comments:    OTHER MEDICATIONS:  Endocrine/Metabolic Medications:    Genitourinary Medications:    Topical/Other Medications:      ==========================PATIENT CARE ACCESS DEVICES===========================  [ ] Peripheral IV  [x ] Central Venous Line	[ ] R	[x ] L	[ x] IJ	[ ] Fem	[ ] SC			Placed:   [x ] Arterial Line		[ ] R	[x ] L	[ ] PT	[ ] DP	[ x] Fem	[ ] Rad	[ ] Ax	Placed:   [ ] PICC:				[ ] Broviac		[ ] Mediport  [ ] Umbilical artery line         [ ] Umbilical venous line  [ ] Urinary Catheter, Date Placed:   [x ] Necessity of urinary, arterial, and venous catheters discussed    ================================PHYSICAL EXAM==================================  General:	In no acute distress on NC  Respiratory:	Lungs clear to auscultation bilaterally. fair aeration. No rales,   .		rhonchi, retractions or wheezing. Effort even and unlabored.  CV:		Regular rate and rhythm. Normal S1/S2. No murmurs, rubs, or   .		gallop. Capillary refill < 2 seconds. Distal pulses 2+ and equal.  Abdomen:	Soft, non-distended. Bowel sounds present. No palpable   .		hepatosplenomegaly.  Skin:		No rash. chest tube sites c/d/i  Extremities:	Warm and well perfused. upper and lower extremity contractures,   Neurologic:	sleeping but appropriately arousable  No acute change from baseline exam.      IMAGING STUDIES:    Echocardiogram, Pediatric (08.05.19)   1. History of subaortic VSD and coarctation of aorta s/p PA band and coarctation repair by subclavian flap method. s/p PA band take down and VSD patch closure. Subaortic membrane s/p resection. Subsequently balloon dilation of recurrent coarctation. Severe pulmonary insufficiency.   2. 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   3. No evidence of neopulmonary stenosis.   4. No evidence of neopulmonary regurgitation.   5. No residual left ventricular outflow tract obstruction.   6. Mild aortic valve regurgitation.   7. Dyskinesia of the interventricular septum.   8. Dilated right ventricle.   9. Mild global hypokinesia of the right ventricle.  10. Mild global hypokinesia of the left ventricle.      Parent/Guardian is at the bedside:	[x ] Yes	[ ] No  Patient and Parent/Guardian updated as to the progress/plan of care:	[x ] Yes	[ ] No    [x ] The patient remains in critical and unstable condition, and requires ICU care and monitoring  [ ] The patient is improving but requires continued monitoring and adjustment of therapy

## 2019-08-05 NOTE — CONSULT NOTE PEDS - ASSESSMENT
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 He had  total bypass time was 40 minutes and came to the ICU extubated and no vasoactive medications. The patient is critically ill in this postoperative period, and requires ongoing ICU monitoring for risk of cardiorespiratory compromise.    CV:  - Continuous cardiopulmonary/telemetry monitoring.  - Goal MAP > 60  - Repeat EKG tomorrow.   - Careful monitoring of chest tube output. Notify cardiology if >100cc/hr, or if abrupt cessation of output.  - If continues to be stable, with goal MAP attainment and good peripheral perfusion, can begin diuresis with IV lasix 6-8 hr post-operatively, to titrate to a negative fluid balance.   - Right sided transvenous, dual chamber pacing system in place- DDD, Lower rate 50 and upper rate of 150 bmp.     RESP:  - Follow ABGs and lactate and Goal SpO2 > 92%.    FEN/GI:  - Strict electrolyte control; maintain K ~3.5, Mg ~2.0, and iCa ~1-1.2. Total fluids ~80% maintenance.  - Careful monitoring of urine output, goal > 1cc/kg/hr. Lasix may be started ~6-8 hours postoperatively if stable.    ID:  - Perioperative Ancef. Maintain normothermia.    HEME:  - Blood products as needed, as per transfusion protocol.    NEURO/PAIN:  - Provide adequate sedation and pain control with Toradol and morphine.

## 2019-08-06 LAB
ANION GAP SERPL CALC-SCNC: 11 MMO/L — SIGNIFICANT CHANGE UP (ref 7–14)
APTT BLD: 29.4 SEC — SIGNIFICANT CHANGE UP (ref 27.5–36.3)
BASE EXCESS BLDA CALC-SCNC: -0.6 MMOL/L — SIGNIFICANT CHANGE UP
BASOPHILS # BLD AUTO: 0.03 K/UL — SIGNIFICANT CHANGE UP (ref 0–0.2)
BASOPHILS NFR BLD AUTO: 0.2 % — SIGNIFICANT CHANGE UP (ref 0–2)
BUN SERPL-MCNC: 13 MG/DL — SIGNIFICANT CHANGE UP (ref 7–23)
CA-I BLD-SCNC: 1.09 MMOL/L — SIGNIFICANT CHANGE UP (ref 1.03–1.23)
CA-I BLDA-SCNC: 1 MMOL/L — LOW (ref 1.15–1.29)
CALCIUM SERPL-MCNC: 8.1 MG/DL — LOW (ref 8.4–10.5)
CHLORIDE SERPL-SCNC: 107 MMOL/L — SIGNIFICANT CHANGE UP (ref 98–107)
CO2 SERPL-SCNC: 23 MMOL/L — SIGNIFICANT CHANGE UP (ref 22–31)
CREAT SERPL-MCNC: 0.65 MG/DL — SIGNIFICANT CHANGE UP (ref 0.5–1.3)
EOSINOPHIL # BLD AUTO: 0.07 K/UL — SIGNIFICANT CHANGE UP (ref 0–0.5)
EOSINOPHIL NFR BLD AUTO: 0.5 % — SIGNIFICANT CHANGE UP (ref 0–6)
GLUCOSE BLDA-MCNC: 180 MG/DL — HIGH (ref 70–99)
GLUCOSE BLDC GLUCOMTR-MCNC: 176 MG/DL — HIGH (ref 70–99)
GLUCOSE SERPL-MCNC: 223 MG/DL — HIGH (ref 70–99)
HCO3 BLDA-SCNC: 24 MMOL/L — SIGNIFICANT CHANGE UP (ref 22–26)
HCT VFR BLD CALC: 34.8 % — LOW (ref 39–50)
HCT VFR BLDA CALC: 33.1 % — LOW (ref 39–51)
HGB BLD-MCNC: 12.1 G/DL — LOW (ref 13–17)
HGB BLDA-MCNC: 10.7 G/DL — LOW (ref 13–17)
IMM GRANULOCYTES NFR BLD AUTO: 0.8 % — SIGNIFICANT CHANGE UP (ref 0–1.5)
INR BLD: 1.29 — HIGH (ref 0.88–1.17)
LACTATE BLDA-SCNC: 1.5 MMOL/L — SIGNIFICANT CHANGE UP (ref 0.5–2)
LYMPHOCYTES # BLD AUTO: 0.69 K/UL — LOW (ref 1–3.3)
LYMPHOCYTES # BLD AUTO: 4.6 % — LOW (ref 13–44)
MAGNESIUM SERPL-MCNC: 1.7 MG/DL — SIGNIFICANT CHANGE UP (ref 1.6–2.6)
MCHC RBC-ENTMCNC: 29.2 PG — SIGNIFICANT CHANGE UP (ref 27–34)
MCHC RBC-ENTMCNC: 34.8 % — SIGNIFICANT CHANGE UP (ref 32–36)
MCV RBC AUTO: 84.1 FL — SIGNIFICANT CHANGE UP (ref 80–100)
MONOCYTES # BLD AUTO: 1.82 K/UL — HIGH (ref 0–0.9)
MONOCYTES NFR BLD AUTO: 12 % — SIGNIFICANT CHANGE UP (ref 2–14)
NEUTROPHILS # BLD AUTO: 12.43 K/UL — HIGH (ref 1.8–7.4)
NEUTROPHILS NFR BLD AUTO: 81.9 % — HIGH (ref 43–77)
NRBC # FLD: 0 K/UL — SIGNIFICANT CHANGE UP (ref 0–0)
PCO2 BLDA: 34 MMHG — LOW (ref 35–48)
PH BLDA: 7.44 PH — SIGNIFICANT CHANGE UP (ref 7.35–7.45)
PHOSPHATE SERPL-MCNC: 2.7 MG/DL — SIGNIFICANT CHANGE UP (ref 2.5–4.5)
PLATELET # BLD AUTO: 137 K/UL — LOW (ref 150–400)
PMV BLD: 10.2 FL — SIGNIFICANT CHANGE UP (ref 7–13)
PO2 BLDA: 81 MMHG — LOW (ref 83–108)
POTASSIUM BLDA-SCNC: 3.4 MMOL/L — SIGNIFICANT CHANGE UP (ref 3.4–4.5)
POTASSIUM SERPL-MCNC: 4.4 MMOL/L — SIGNIFICANT CHANGE UP (ref 3.5–5.3)
POTASSIUM SERPL-SCNC: 4.4 MMOL/L — SIGNIFICANT CHANGE UP (ref 3.5–5.3)
PROTHROM AB SERPL-ACNC: 14.8 SEC — HIGH (ref 9.8–13.1)
RBC # BLD: 4.14 M/UL — LOW (ref 4.2–5.8)
RBC # FLD: 11.9 % — SIGNIFICANT CHANGE UP (ref 10.3–14.5)
SAO2 % BLDA: 97.2 % — SIGNIFICANT CHANGE UP (ref 95–99)
SODIUM BLDA-SCNC: 140 MMOL/L — SIGNIFICANT CHANGE UP (ref 136–146)
SODIUM SERPL-SCNC: 141 MMOL/L — SIGNIFICANT CHANGE UP (ref 135–145)
WBC # BLD: 15.16 K/UL — HIGH (ref 3.8–10.5)
WBC # FLD AUTO: 15.16 K/UL — HIGH (ref 3.8–10.5)

## 2019-08-06 PROCEDURE — 71045 X-RAY EXAM CHEST 1 VIEW: CPT | Mod: 26

## 2019-08-06 PROCEDURE — 99291 CRITICAL CARE FIRST HOUR: CPT

## 2019-08-06 RX ORDER — ACETAMINOPHEN 500 MG
650 TABLET ORAL EVERY 6 HOURS
Refills: 0 | Status: DISCONTINUED | OUTPATIENT
Start: 2019-08-06 | End: 2019-08-07

## 2019-08-06 RX ORDER — FUROSEMIDE 40 MG
20 TABLET ORAL EVERY 6 HOURS
Refills: 0 | Status: DISCONTINUED | OUTPATIENT
Start: 2019-08-06 | End: 2019-08-07

## 2019-08-06 RX ORDER — FUROSEMIDE 40 MG
10 TABLET ORAL ONCE
Refills: 0 | Status: COMPLETED | OUTPATIENT
Start: 2019-08-06 | End: 2019-08-06

## 2019-08-06 RX ADMIN — Medication 30 MILLIGRAM(S): at 08:42

## 2019-08-06 RX ADMIN — Medication 2 MILLIGRAM(S): at 01:30

## 2019-08-06 RX ADMIN — Medication 30 MILLIGRAM(S): at 02:15

## 2019-08-06 RX ADMIN — Medication 4 MILLIGRAM(S): at 12:05

## 2019-08-06 RX ADMIN — Medication 5 MILLIGRAM(S): at 17:12

## 2019-08-06 RX ADMIN — Medication 4 MILLIGRAM(S): at 18:18

## 2019-08-06 RX ADMIN — Medication 30 MILLIGRAM(S): at 08:57

## 2019-08-06 RX ADMIN — Medication 400 MILLIGRAM(S): at 19:41

## 2019-08-06 RX ADMIN — Medication 3 UNIT(S)/KG/HR: at 07:11

## 2019-08-06 RX ADMIN — Medication 650 MILLIGRAM(S): at 05:35

## 2019-08-06 RX ADMIN — Medication 650 MILLIGRAM(S): at 11:48

## 2019-08-06 RX ADMIN — Medication 2000 UNIT(S): at 10:55

## 2019-08-06 RX ADMIN — Medication 650 MILLIGRAM(S): at 17:12

## 2019-08-06 RX ADMIN — Medication 186 MILLIGRAM(S): at 04:01

## 2019-08-06 RX ADMIN — Medication 30 MILLIGRAM(S): at 19:41

## 2019-08-06 RX ADMIN — MORPHINE SULFATE 24 MILLIGRAM(S): 50 CAPSULE, EXTENDED RELEASE ORAL at 10:55

## 2019-08-06 RX ADMIN — Medication 186 MILLIGRAM(S): at 12:10

## 2019-08-06 RX ADMIN — Medication 30 MILLIGRAM(S): at 02:00

## 2019-08-06 RX ADMIN — Medication 30 MILLIGRAM(S): at 14:09

## 2019-08-06 RX ADMIN — Medication 200 MILLIGRAM(S): at 10:55

## 2019-08-06 RX ADMIN — Medication 2 MILLIGRAM(S): at 05:35

## 2019-08-06 RX ADMIN — Medication 650 MILLIGRAM(S): at 11:05

## 2019-08-06 RX ADMIN — Medication 30 MILLIGRAM(S): at 14:30

## 2019-08-06 RX ADMIN — Medication 5 MILLIGRAM(S): at 06:57

## 2019-08-06 RX ADMIN — Medication 650 MILLIGRAM(S): at 05:04

## 2019-08-06 RX ADMIN — Medication 30 MILLIGRAM(S): at 19:56

## 2019-08-06 RX ADMIN — Medication 650 MILLIGRAM(S): at 18:00

## 2019-08-06 RX ADMIN — Medication 186 MILLIGRAM(S): at 19:41

## 2019-08-06 RX ADMIN — MORPHINE SULFATE 4 MILLIGRAM(S): 50 CAPSULE, EXTENDED RELEASE ORAL at 11:15

## 2019-08-06 NOTE — PROGRESS NOTE PEDS - SUBJECTIVE AND OBJECTIVE BOX
INTERVAL HISTORY: Some nausea overnight, well controlled with zofran. No arrhythmias overnight on telemetry. Tolerating diet.    RESPIRATORY SUPPORT: room air    NUTRITION: regular diet    INTAKE/OUTPUT:   @ 07:01  -   @ 07:00  --------------------------------------------------------  IN: 1754 mL / OUT: 1800 mL / NET: -46 mL    CHEST TUBE OUTPUT: mediastinal: 410 mL/24h, 130 mL/12h, right pleural: 60 mL/24h, 10 mL/12h    INTRAVASCULAR ACCESS: LIJ double lumen catheter, 2 PIVs, femoral artery line    MEDICATIONS:  furosemide  IV Intermittent - Peds 20 milliGRAM(s) IV Intermittent every 6 hours  ceFAZolin  IV Intermittent - Peds 1860 milliGRAM(s) IV Intermittent every 8 hours  acetaminophen   Oral Tab/Cap - Peds. 650 milliGRAM(s) Oral every 6 hours  carBAMazepine  Oral Tab/Cap - Peds 200 milliGRAM(s) Oral daily  carBAMazepine  Oral Tab/Cap - Peds 400 milliGRAM(s) Oral daily  ketorolac Injection - Peds. 30 milliGRAM(s) IV Push every 6 hours  trihexyphenidyl Oral Tab/Cap - Peds 5 milliGRAM(s) Oral <User Schedule>  cholecalciferol Oral Tab/Cap - Peds 2000 Unit(s) Oral daily  dextrose 5% + sodium chloride 0.9% with potassium chloride 20 mEq/L. - Pediatric 1000 milliLiter(s) IV Continuous <Continuous>  heparin   Infusion - Pediatric 0.054 Unit(s)/kG/Hr IV Continuous <Continuous>  heparin   Infusion - Pediatric 0.054 Unit(s)/kG/Hr IV Continuous <Continuous>    PHYSICAL EXAMINATION:  Vital signs - Weight (kg): 55.8 ( @ 06:42)  T(C): 36.9 (19 @ 11:00), Max: 37.3 (19 @ 05:00)  HR: 94 (19 @ 11:00) (70 - 101)  BP: 127/66 (19 @ 08:00) (92/44 - 127/66)  ABP:  (82/50 - 106/57)  RR: 20 (19 @ 11:00) (13 - 29)  SpO2: 92% (19 @ 11:00) (92% - 98%)  CVP(mm Hg):  (-4 - 10)    General - non-dysmorphic appearance, well-developed, in no distress.  Skin - no rash, no desquamation, no cyanosis.  Eyes / ENT - no conjunctival injection, sclerae anicteric, external ears & nares normal, mucous membranes moist.  Pulmonary - normal inspiratory effort, no retractions, lungs clear to auscultation bilaterally, no wheezes, no rales. R chest tube in place and central packing.   Cardiovascular - normal rate, regular rhythm, normal S1 & S2, no murmurs, no rubs, no gallops, capillary refill < 2sec, normal pulses.  Gastrointestinal - soft, non-distended, non-tender, no hepatosplenomegaly  Musculoskeletal - no joint swelling, no clubbing, no edema.  Neurologic / Psychiatric - alert, oriented as age-appropriate, affect appropriate, moves all extremities, normal tone.    LABORATORY TESTS:                          12.1  CBC:   15.16 )-----------( 137   (19 @ 01:20)                          34.8               141   |  107   |  13                 Ca: 8.1    BMP:   ----------------------------< 223    M.7   (19 @ 01:20)             4.4    |  23    | 0.65               Ph: 2.7      LFT:     TPro: 6.1 / Alb: 4.2 / TBili: 0.6 / DBili: x / AST: 21 / ALT: 18 / AlkPhos: 59   (19 @ 13:09)    COAG: PT: 14.8 / PTT: 29.4 / INR: 1.29   (19 @ 01:23)       ABG:   pH: 7.44 / pCO2: 34 / pO2: 81 / HCO3: 24 / Base Excess: -0.6 / SaO2: 97.2 / Lactate: 1.5 / iCa: 1.00   (19 @ 01:20)      VBG:   pH: 7.45 / pCO2: 38 / pO2: 59 / HCO3: 26 / Base Excess: 2.2 / SaO2: 91.2   (19 @ 09:47)    IMAGING STUDIES:  Electrocardiogram - 19  Atrial sensing, ventricular sensing with 100% capture    Telemetry - normal sinus rhythm, no ectopy, no arrhythmias (Atrial sensing, ventricular sensing with 100% capture)    Chest x-ray -   Xray Chest 1 View- PORTABLE-Routine (19 @ 12:33) >  MPRESSION: The patient is status post pulmonic valve replacement. A   right cardiac device is noted as on the prior study. There is a   mediastinal drainage catheter. There is a left IJ line with the tip   within a left-sided superior vena cava.    The heart size appears enlarged. There is retrocardiac opacity likely due   to atelectasis. There may be a tiny right pneumothorax. Right-sided   pigtail pleural catheter is noted. Follow-up is recommended.        Echocardiogram:   Echocardiogram, Pediatric (19)   1. History of subaortic VSD and coarctation of aorta s/p PA band and coarctation repair by subclavian flap method. s/p PA band take down and VSD patch closure. Subaortic membrane s/p resection. Subsequently balloon dilation of recurrent coarctation. Severe pulmonary insufficiency.   2. 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   3. No evidence of neopulmonary stenosis.   4. No evidence of neopulmonary regurgitation.   5. No residual left ventricular outflow tract obstruction.   6. Mild aortic valve regurgitation.   7. Dyskinesia of the interventricular septum.   8. Dilated right ventricle.   9. Mild global hypokinesia of the right ventricle.  10. Mild global hypokinesia of the left ventricle.      19 CATH (Dr. Forde): Normal CO (3.6 L/min), RUPV draining to RSVC (just below azygous entry) w/ Qp:Qs 1.7-1.9. Free PI w/ normal RVEDp (7mmHg), dilated MPA and unobstructed branch PAs. Unobstructed aortic arch (PSEG 8mmHg) with mild hypoplasia and tortuosity of transverse arch. Patent LSVC to CS (smaller than RSVC and no bridging vein). No atrial communication. 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 No arrhythmias overnight on telemetry. Tolerating diet.    RESPIRATORY SUPPORT: room air    NUTRITION: regular diet    INTAKE/OUTPUT:  08- @ 07:01  -   @ 07:00  --------------------------------------------------------  IN: 1754 mL / OUT: 1800 mL / NET: -46 mL    CHEST TUBE OUTPUT: mediastinal: 410 mL/24h, 130 mL/12h, right pleural: 60 mL/24h, 10 mL/12h    INTRAVASCULAR ACCESS: LIJ double lumen catheter, 2 PIVs, femoral artery line    MEDICATIONS:  furosemide  IV Intermittent - Peds 20 milliGRAM(s) IV Intermittent every 6 hours  ceFAZolin  IV Intermittent - Peds 1860 milliGRAM(s) IV Intermittent every 8 hours  acetaminophen   Oral Tab/Cap - Peds. 650 milliGRAM(s) Oral every 6 hours  carBAMazepine  Oral Tab/Cap - Peds 200 milliGRAM(s) Oral daily  carBAMazepine  Oral Tab/Cap - Peds 400 milliGRAM(s) Oral daily  ketorolac Injection - Peds. 30 milliGRAM(s) IV Push every 6 hours  trihexyphenidyl Oral Tab/Cap - Peds 5 milliGRAM(s) Oral <User Schedule>  cholecalciferol Oral Tab/Cap - Peds 2000 Unit(s) Oral daily  dextrose 5% + sodium chloride 0.9% with potassium chloride 20 mEq/L. - Pediatric 1000 milliLiter(s) IV Continuous <Continuous>  heparin   Infusion - Pediatric 0.054 Unit(s)/kG/Hr IV Continuous <Continuous>  heparin   Infusion - Pediatric 0.054 Unit(s)/kG/Hr IV Continuous <Continuous>    PHYSICAL EXAMINATION:  Vital signs - Weight (kg): 55.8 ( 06:42)  T(C): 36.9 (19 @ 11:00), Max: 37.3 (19 @ 05:00)  HR: 94 (19 @ 11:00) (70 - 101)  BP: 127/66 (19 @ 08:00) (92/44 - 127/66)  ABP:  (82/50 - 106/57)  RR: 20 (19 @ 11:00) (13 - 29)  SpO2: 92% (19 @ 11:00) (92% - 98%)  CVP(mm Hg):  (-4 - 10)    General - non-dysmorphic appearance, well-developed, in no distress.  Skin - no rash, no desquamation, no cyanosis.  Eyes / ENT - no conjunctival injection, sclerae anicteric, external ears & nares normal, mucous membranes moist.  Pulmonary - normal inspiratory effort, no retractions, lungs clear to auscultation bilaterally, no wheezes, no rales. R chest tube in place and central packing.   Cardiovascular - normal rate, regular rhythm, normal S1 & S2, no murmurs, no rubs, no gallops, capillary refill < 2sec, normal pulses.  Gastrointestinal - soft, non-distended, non-tender, no hepatosplenomegaly  Musculoskeletal - no joint swelling, no clubbing, no edema.  Neurologic / Psychiatric - alert, oriented as age-appropriate, affect appropriate, moves all extremities, normal tone.    LABORATORY TESTS:                          12.1  CBC:   15.16 )-----------( 137   (19 @ 01:20)                          34.8               141   |  107   |  13                 Ca: 8.1    BMP:   ----------------------------< 223    M.7   (19 @ 01:20)             4.4    |  23    | 0.65               Ph: 2.7      LFT:     TPro: 6.1 / Alb: 4.2 / TBili: 0.6 / DBili: x / AST: 21 / ALT: 18 / AlkPhos: 59   (19 @ 13:09)    COAG: PT: 14.8 / PTT: 29.4 / INR: 1.29   (19 @ 01:23)       ABG:   pH: 7.44 / pCO2: 34 / pO2: 81 / HCO3: 24 / Base Excess: -0.6 / SaO2: 97.2 / Lactate: 1.5 / iCa: 1.00   (19 @ 01:20)      VBG:   pH: 7.45 / pCO2: 38 / pO2: 59 / HCO3: 26 / Base Excess: 2.2 / SaO2: 91.2   (19 @ 09:47)    IMAGING STUDIES:  Electrocardiogram - 19  Atrial sensing, ventricular sensing with 100% capture    Telemetry - normal sinus rhythm, no ectopy, no arrhythmias (Atrial sensing, ventricular sensing with 100% capture)    Chest x-ray -   Xray Chest 1 View- PORTABLE-Routine (19 @ 12:33) >  MPRESSION: The patient is status post pulmonic valve replacement. A   right cardiac device is noted as on the prior study. There is a   mediastinal drainage catheter. There is a left IJ line with the tip   within a left-sided superior vena cava.    The heart size appears enlarged. There is retrocardiac opacity likely due   to atelectasis. There may be a tiny right pneumothorax. Right-sided   pigtail pleural catheter is noted. Follow-up is recommended.        Echocardiogram:   Echocardiogram, Pediatric (19)   1. History of subaortic VSD and coarctation of aorta s/p PA band and coarctation repair by subclavian flap method. s/p PA band take down and VSD patch closure. Subaortic membrane s/p resection. Subsequently balloon dilation of recurrent coarctation. Severe pulmonary insufficiency.   2. 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   3. No evidence of neopulmonary stenosis.   4. No evidence of neopulmonary regurgitation.   5. No residual left ventricular outflow tract obstruction.   6. Mild aortic valve regurgitation.   7. Dyskinesia of the interventricular septum.   8. Dilated right ventricle.   9. Mild global hypokinesia of the right ventricle.  10. Mild global hypokinesia of the left ventricle.      19 CATH (Dr. Forde): Normal CO (3.6 L/min), RUPV draining to RSVC (just below azygous entry) w/ Qp:Qs 1.7-1.9. Free PI w/ normal RVEDp (7mmHg), dilated MPA and unobstructed branch PAs. Unobstructed aortic arch (PSEG 8mmHg) with mild hypoplasia and tortuosity of transverse arch. Patent LSVC to CS (smaller than RSVC and no bridging vein). No atrial communication.

## 2019-08-06 NOTE — PROGRESS NOTE PEDS - ASSESSMENT
33 y/o with CP, autism, hx CVA, now status post  pulmonary valve replacement (27 mm bovine pericardial valve) due to free PI on 8/5/19. He is doing well post-operatively extubated, on no vasoactive meds. He has underlying heart block for which he has an epicardial DDD  pacemaker,  no additional rhythm issues intraoperatively. Prior cardiac surgery included VSD and CoA repair. 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      Wean NC as tolerated (he has no atrial communication and as such expect normal saturations post-operatively)  No vasoactive medications (MAP goal >60)  EKG now,   has epicardial DDD pacemaker   monitor UOP, consider lasix tonight  ABG, CBC, BMP now  IVF @ 2/3 x M, clears when awake  K>3.5, Mg>2, iCa> 1.2  monitor chest tube output (MCT, RCT, note RCT placed for pneumothorax)  alert cardiology if >2/kg/hr, and CT surg if >5/kg/hr  ancef x 48 hrs  pain control with toradol, tylenol morphine prn  BMP, CBC, CXR in AM 35 y/o with CP, autism, hx CVA, now status post  pulmonary valve replacement (27 mm bovine pericardial valve) due to free PI on 8/5/19. He is doing well post-operatively extubated, on no vasoactive meds. He has underlying heart block for which he has an epicardial DDD  pacemaker,  no additional rhythm issues intraoperatively. Prior cardiac surgery included VSD and CoA repair. 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    Room air  IJ/hollis/wilkinson out  lasix 20IV Q6  CXR am   chem and INR am     No vasoactive medications (MAP goal >60)    has epicardial DDD pacemaker     reg diet no IVF  K>3.5, Mg>2, iCa> 1.2  comadin in am for valve      monitor chest tube output (MCT, RCT, note RCT placed for pneumothorax)  alert cardiology if >2/kg/hr, and CT surg if >5/kg/hr  ancef x 48 hrs  pain control with toradol, tylenol morphine prn  BMP, CBC, CXR in AM 33 y/o with CP, autism, hx CVA, now status post  pulmonary valve replacement (27 mm bovine pericardial valve) due to free PI on 8/5/19. He is doing well post-operatively extubated, on no vasoactive meds. He has underlying heart block for which he has an epicardial DDD  pacemaker,   Prior cardiac surgery included VSD and CoA repair. 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    Doing well post-operatively de-intensifying care with close monitoring  pulmonary toliet and IS for desats and atelectatsis, (goal sats normal >92% room air)  lasix 20mg IV Q6 for goal negative balance  no other vasoactive medications  R CT no air leak no recurrent pneumo on clamping, will remove today  LCT to remain in place to monitor output  CXR in am to asses lung parenchyma and CT  has epicardial DDD pacemaker   reg diet no IVF  K>3.5, Mg>2, iCa> 1.2  pain control with toradol, tylenol morphine as neededANDREY/hollis/minh out  pain control with toradol, tylenol morphine prn    chem and INR am   coumadin QHS to start tomorrow for PV anticoagulation for valve

## 2019-08-06 NOTE — PROGRESS NOTE PEDS - ASSESSMENT
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 He had  total bypass time was 40 minutes and came to the ICU extubated and no vasoactive medications. The patient is critically ill in this postoperative period, and requires ongoing ICU monitoring for risk of cardiorespiratory compromise.    CV:  - Continuous cardiopulmonary/telemetry monitoring.  - Goal MAP > 60  - No EKG needed today   - Careful monitoring of chest tube output. Notify cardiology if >100cc/hr, or if abrupt cessation of output.  - Pull R pleural chest tube  - Lasix 20mg q6h, to titrate to a negative fluid balance.   - s/p lasix 10mg, x2 doses  - Right sided transvenous, dual chamber pacing system in place- DDD, Lower rate 50 and upper rate of 150 bmp.     RESP:  - Follow ABGs and lactate and Goal SpO2 > 92%.    FEN/GI:  - Strict electrolyte control; maintain K ~3.5, Mg ~2.0, and iCa ~1-1.2. Total fluids ~80% maintenance.  - Careful monitoring of urine output, goal > 1cc/kg/hr    ID:  - Perioperative Ancef. Maintain normothermia.    HEME:  - Blood products as needed, as per transfusion protocol.    NEURO/PAIN:  - Provide adequate sedation and pain control with Toradol and morphine. 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 He had  total bypass time was 40 minutes and came to the ICU extubated and no vasoactive medications. The patient is critically ill in this postoperative period, and requires ongoing ICU monitoring for risk of cardiorespiratory compromise.    CV:  - Continuous cardiopulmonary/telemetry monitoring.  - Goal MAP > 60  - No EKG needed today   - Careful monitoring of chest tube output. Notify cardiology if >100cc/hr, or if abrupt cessation of output.  - Remove R pleural chest tube/a-line and IJ  - Lasix 20mg q6h, to titrate to a negative fluid balance.   - s/p lasix 10mg, x2 doses  - Right sided transvenous, dual chamber pacing system in place- DDD, Lower rate 50 and upper rate of 150 bmp.     RESP:  - Follow ABGs and lactate and Goal SpO2 > 92%.    FEN/GI:  - Strict electrolyte control; maintain K ~3.5, Mg ~2.0, and iCa ~1-1.2. Total fluids ~80% maintenance.  - Careful monitoring of urine output, goal > 1cc/kg/hr    ID:  - Perioperative Ancef. Maintain normothermia.    HEME:  - Blood products as needed, as per transfusion protocol.  - Start Coumadin tomorrow AM.     NEURO/PAIN:  - Provide adequate sedation and pain control with Toradol and morphine.

## 2019-08-06 NOTE — PROGRESS NOTE PEDS - SUBJECTIVE AND OBJECTIVE BOX
POST ANESTHESIA EVALUATION    34y Male POSTOP DAY 1 S/P PVR    MENTAL STATUS: Patient participation [ x ] Awake     [  ] Arousable     [  ] Sedated    AIRWAY PATENCY: [ x ] Satisfactory  [  ] Other:     Vital Signs Last 24 Hrs  T(C): 37.2 (06 Aug 2019 08:00), Max: 37.3 (06 Aug 2019 05:00)  T(F): 98.9 (06 Aug 2019 08:00), Max: 99.1 (06 Aug 2019 05:00)  HR: 101 (06 Aug 2019 08:00) (70 - 101)  BP: 127/66 (06 Aug 2019 08:00) (92/44 - 127/66)  BP(mean): 78 (06 Aug 2019 08:00) (50 - 94)  RR: 29 (06 Aug 2019 08:00) (13 - 29)  SpO2: 96% (06 Aug 2019 08:00) (92% - 98%)  I&O's Summary    05 Aug 2019 07:01  -  06 Aug 2019 07:00  --------------------------------------------------------  IN: 1754 mL / OUT: 1800 mL / NET: -46 mL    06 Aug 2019 07:01  -  06 Aug 2019 10:21  --------------------------------------------------------  IN: 175 mL / OUT: 310 mL / NET: -135 mL          NAUSEA/ VOMITTING:  [x  ] NONE  [  ] CONTROLLED [  ] OTHER     PAIN: [ x ] CONTROLLED WITH CURRENT REGIMEN  [  ] OTHER    [ x ] NO APPARENT ANESTHESIA COMPLICATIONS      Comments:

## 2019-08-06 NOTE — PATIENT PROFILE PEDIATRIC. - PATIENT REPRESENTATIVE: ( YOU CAN CHOOSE ANY PERSON THAT CAN ASSIST YOU WITH YOUR HEALTH CARE PREFERENCES, DOES NOT HAVE TO BE A SPOUSE, IMMEDIATE FAMILY OR SIGNIFICANT OTHER/PARTNER)
Problem: Mobility Impaired (Adult and Pediatric)  Goal: *Acute Goals and Plan of Care (Insert Text)  Physical Therapy Goals  Initiated 8/11/2018  1. Patient will move from supine to sit and sit to supine  in bed with independence within 7 day(s). 2.  Patient will transfer from bed to chair and chair to bed with minimal assistance/contact guard assist using the least restrictive device within 7 day(s). 3.  Patient will perform sit to stand with minimal assistance/contact guard assist within 7 day(s). 4.  Patient will ambulate with minimal assistance/contact guard assist for 10 feet with the least restrictive device within 7 day(s). physical Therapy EVALUATION  Patient: Nina Lopez (71 y.o. male)  Date: 8/11/2018  Primary Diagnosis: Fracture of fibula with tibia, left, closed  left tibia fracture  Procedure(s) (LRB):  EX FIX LEFT TIBIA (Left) 1 Day Post-Op   Precautions: NWB left LE       ASSESSMENT :  Based on the objective data described below, the patient presents with general decrease in strength/activity tolerance, difficulty with unilateral weight-bearing right LE (weakness from 2 previous TKRs), decreased standing balance/tolerance, and decreased transfers. Gait not assessed today due to difficulty with pivot transfer bed to chair to the right (moderate assist x 2 with rolling walker to right). He requires maximal assist x 2 to achieve standing, NWB left, at rolling walker. He demonstrates some mild confusion/poor decision making, but has been recently medicated and low O2 saturation on room air (84% at rest, nursing aware). 3 L O2 seems to be optimal with activity, and he is left with cannula in place. He will need either Inpatient Rehab or SNF Rehab to restore his functional independence/strength/safety before returning to his home alone. Patient will benefit from skilled intervention to address the above impairments.   Patients rehabilitation potential is considered to be Good  Factors which may influence rehabilitation potential include:   [x]         None noted  []         Mental ability/status  []         Medical condition  []         Home/family situation and support systems  []         Safety awareness  []         Pain tolerance/management  []         Other:      PLAN :  Recommendations and Planned Interventions:  []           Bed Mobility Training             []    Neuromuscular Re-Education  []           Transfer Training                   []    Orthotic/Prosthetic Training  []           Gait Training                         []    Modalities  []           Therapeutic Exercises           []    Edema Management/Control  []           Therapeutic Activities            []    Patient and Family Training/Education  []           Other (comment):    Frequency/Duration: Patient will be followed by physical therapy  daily to address goals. Discharge Recommendations: Inpatient Rehab and Skilled Nursing Facility  Further Equipment Recommendations for Discharge: he has \"a lot\" of medical equipment for his wife, who is now in a memory care center. SUBJECTIVE:   Patient stated I've always used crutches.  He seems resistant to the walker, even with education regarding the increased safety/stability of walker vs crutches. OBJECTIVE DATA SUMMARY:   HISTORY:    Past Medical History:   Diagnosis Date    CAD (coronary artery disease)     STENT IN 2004    [facility]     PLACES BURNED OFF, NOT SURE IF ITS CANCER    Chronic pain     GERD (gastroesophageal reflux disease)     Hypertension     Psychiatric disorder     ANXIETY     Past Surgical History:   Procedure Laterality Date    CARDIAC SURG PROCEDURE UNLIST  2004    stent    COLONOSCOPY N/A 5/10/2018    COLONOSCOPY performed by Netta Flores.  Ashley Chaidez MD at [address] [place] [address] ENDOSCOPY    HX ORTHOPAEDIC  2009    bilateral knee replacement    HX ORTHOPAEDIC  2006    repair crack right hip    NEUROLOGICAL PROCEDURE UNLISTED      LUMBAR SURGERY     Prior Level of Function/Home Situation: Lives alone, was independent with all ADLs and mobility, driving. Wife is in a memory care center. Neighbors are supportive. Personal factors and/or comorbidities impacting plan of care: weak right LE s/p 2 TKRs. Home Situation  Home Environment: Private residence  # Steps to Enter: 4  Rails to Enter: Yes  Hand Rails : Left  One/Two Story Residence: One story  Living Alone: Yes  Support Systems: Friends \ neighbors (wife is in [place])  Patient Expects to be Discharged to[de-identified] Private residence  Current DME Used/Available at Home: Jacey Chula, rolling, [address] chair  Tub or Shower Type: Shower    EXAMINATION/PRESENTATION/DECISION MAKING:   Critical Behavior:  Neurologic State: Alert  Orientation Level: Oriented to person, Oriented to place, Oriented to situation  Cognition: Decreased attention/concentration, Impulsive, Decreased command following (he was medicated prior to session, also, O2 saturation is low on room air)  Safety/Judgement: Awareness of environment, Decreased insight into deficits  Hearing: Auditory  Auditory Impairment: None  Skin:  external hardware left LE evident, with ace wrap covering splint. IV right UE. TKR scars evident both knees.   Edema: left knee/thigh  Range Of Motion:  AROM: Generally decreased, functional (except for casted distal LE/ankle)                       Strength:    Strength: Generally decreased, functional                    Tone & Sensation:   Tone: Normal              Sensation: Intact               Coordination:  Coordination: Generally decreased, functional  Vision:      Functional Mobility:  Bed Mobility:     Supine to Sit: Supervision        Transfers:  Sit to Stand: Maximum assistance;Assist x2  Stand to Sit: Minimum assistance;Assist x2        Bed to Chair: Moderate assistance;Assist x2 (pivot transfer to right, right hand on armrest of chair initially to increase leverage)              Balance:   Sitting: Intact  Standing: Impaired  Standing - Static: Constant support;Poor (stands with walker, minimal assist x 2, NWB left for ~20 seconds)  Standing - Dynamic : Poor  Ambulation/Gait Training:              Gait Description (WDL):  (unable to address due to difficulty with NWB left (weaker right leg))                                                  Therapeutic Exercises:   AROM both UE/LEs prior to mobility training    Functional Measure:    Elder Mobility Scale    3/20         EMS and G-code impairment scale:  Percentage of impairment CH  0% CI  1-19% CJ  20-39% CK  40-59% CL  60-79% CM  80-99% CN  100%   EMS Score 0-20 20 17-19 13-16 9-12 5-8 1-4 0      Scores under 10  generally these patients are dependent in mobility maneuvers; require help with  basic ADL, such as transfers, toileting and dressing. Scores between 10  13  generally these patients are borderline in terms of safe mobility and  independence in ADL i.e. they require some help with some mobility maneuvers. Scores over 14  Generally these patients are able to perform mobility maneuvers alone and safely  and are independent in basic ADL. G codes: In compliance with CMSs Claims Based Outcome Reporting, the following G-code set was chosen for this patient based on their primary functional limitation being treated: The outcome measure chosen to determine the severity of the functional limitation was the Elderly Mobility Scale with a score of 3/20 which was correlated with the impairment scale.     ? Mobility - Walking and Moving Around:     - CURRENT STATUS: CM - 80%-99% impaired, limited or restricted    - GOAL STATUS: CL - 60%-79% impaired, limited or restricted    - D/C STATUS:  ---------------To be determined---------------      Physical Therapy Evaluation Charge Determination   History Examination Presentation Decision-Making   MEDIUM  Complexity : 1-2 comorbidities / personal factors will impact the outcome/ POC  LOW Complexity : 1-2 Standardized tests and measures addressing body structure, function, activity limitation and / or participation in recreation  LOW Complexity : Stable, uncomplicated  LOW Complexity : FOTO score of       Based on the above components, the patient evaluation is determined to be of the following complexity level: LOW     Pain:  Pain Scale 1: Numeric (0 - 10)  Pain Intensity 1: 9  Pain Location 1: Ankle  Pain Orientation 1: Anterior;Posterior  Pain Description 1: Sharp  Pain Intervention(s) 1: Medication (see MAR)  Activity Tolerance:   Poor, limited by weak right LE/NWB left LE  Please refer to the flowsheet for vital signs taken during this treatment. After treatment:   [x]         Patient left in no apparent distress sitting up in chair  []         Patient left in no apparent distress in bed  [x]         Call bell left within reach  [x]         Nursing notified  []         Caregiver present  []         Bed alarm activated    COMMUNICATION/EDUCATION:   The patients plan of care was discussed with: Occupational Therapist and Registered Nurse. [x]         Fall prevention education was provided and the patient/caregiver indicated understanding. [x]         Patient/family have participated as able in goal setting and plan of care. [x]         Patient/family agree to work toward stated goals and plan of care. []         Patient understands intent and goals of therapy, but is neutral about his/her participation. []         Patient is unable to participate in goal setting and plan of care.     Thank you for this referral.  Karen Campo, PT   Time Calculation: 33 mins Information could not be obtained

## 2019-08-06 NOTE — PROGRESS NOTE PEDS - SUBJECTIVE AND OBJECTIVE BOX
Today's Date:      ********************************************RESPIRATORY**********************************************  RR: 16 (19 @ 06:00) (13 - 24)  SpO2: 94% (19 @ 06:00) (92% - 98%)  Wt(kg): --    Respiratory Support:    Respiratory Medications:          *******************************************CARDIOVASCULAR********************************************  HR: 95 (19 @ 06:00) (70 - 98)  BP: 122/57 (19 @ 20:00) (92/44 - 124/87)  Wt(kg): --  Cardiac Rhythm: NSR    Cardiovascular Medications:  furosemide  IV Intermittent - Peds 20 milliGRAM(s) IV Intermittent every 6 hours      *********************************HEMATOLOGIC/ONCOLOGIC*******************************************  ( @ 01:20):               12.1   15.16)-----------(137                34.8   Neurophils% (auto):   81.9    manual%: x      Lymphocytes% (auto):  4.6     manual%: x      Eosinphils% (auto):   0.5     manual%: x      Bands%: x       blasts%: x        ( @ 13:09):               13.2   21.71)-----------(151                39.0   Neurophils% (auto):   79.2    manual%: 68.0   Lymphocytes% (auto):  7.6     manual%: 13.0   Eosinphils% (auto):   0.0     manual%: 1.0    Bands%: 2.0     blasts%: x          (  @ 01:23 )   PT: 14.8 SEC;   INR: 1.29   aPTT: 29.4 SEC           Hematologic/Oncologic Medications:  heparin   Infusion - Pediatric 0.054 Unit(s)/kG/Hr IV Continuous <Continuous>  heparin   Infusion - Pediatric 0.054 Unit(s)/kG/Hr IV Continuous <Continuous>      ********************************************INFECTIOUS************************************************  T(C): 37.3 (19 @ 05:00), Max: 37.3 (19 @ 05:00)  Wt(kg): --        Medications:  ceFAZolin  IV Intermittent - Peds 1860 milliGRAM(s) IV Intermittent every 8 hours      Labs:      ******************************FLUIDS/ELECTROLYTES/NUTRITION*************************************  Drug Dosing Weight  Weight (kg): 55.8 (19 @ 06:42)       Daily     I&O's Summary    05 Aug 2019 07:01  -  06 Aug 2019 07:00  --------------------------------------------------------  IN: 1754 mL / OUT: 1800 mL / NET: -46 mL        Labs:   @ 01:20    141    |  107    |  13     ----------------------------<  223    4.4     |  23     |  0.65     I.Ca:1.09  M.7   Ph:2.7         @ 13:09    141    |  104    |  11     ----------------------------<  138    3.9     |  25     |  0.64     I.Ca:x     M.7   Ph:3.7           @ 13:09  TPro  6.1     AST  21     Alb  4.2      ALT  18     TBili  0.6    AlkPhos  59     DBili  x      Trig: x          Diet:	    	  Gastrointestinal Medications:  cholecalciferol Oral Tab/Cap - Peds 2000 Unit(s) Oral daily  dextrose 5% + sodium chloride 0.9% with potassium chloride 20 mEq/L. - Pediatric 1000 milliLiter(s) IV Continuous <Continuous>      *****************************************NEUROLOGY**********************************************  [ ] LULY-1:          Standing Medications:  acetaminophen   Oral Tab/Cap - Peds. 650 milliGRAM(s) Oral every 6 hours  carBAMazepine  Oral Tab/Cap - Peds 200 milliGRAM(s) Oral daily  carBAMazepine  Oral Tab/Cap - Peds 400 milliGRAM(s) Oral daily  ketorolac Injection - Peds. 30 milliGRAM(s) IV Push every 6 hours  trihexyphenidyl Oral Tab/Cap - Peds 5 milliGRAM(s) Oral <User Schedule>    PRN Medications:  morphine  IV Intermittent - Peds 4 milliGRAM(s) IV Intermittent every 3 hours PRN Severe Pain (7 - 10)      Labs:      Adequacy of sedation and pain control has been assessed and adjusted    ************************************* OTHER MEDICATIONS ****************************************  Endocrine/Metabolic Medications:    Genitourinary Medications:    Topical/Other Medications:        *******************************PATIENT CARE ACCESS DEVICES******************************    Necessity of urinary, arterial, and venous catheters discussed    ****************************************PHYSICAL EXAM********************************************  General:	In no acute distress on NC  Respiratory:	Lungs clear to auscultation bilaterally. fair aeration. No rales,   .		rhonchi, retractions or wheezing. Effort even and unlabored.  CV:		Regular rate and rhythm. Normal S1/S2. No murmurs, rubs, or   .		gallop. Capillary refill < 2 seconds. Distal pulses 2+ and equal.  Abdomen:	Soft, non-distended. Bowel sounds present. No palpable   .		hepatosplenomegaly.  Skin:		No rash. chest tube sites c/d/i  Extremities:	Warm and well perfused. upper and lower extremity contractures,   Neurologic:	sleeping but appropriately arousable  No acute change from baseline exam.      *****************************************IMAGING STUDIES*****************************************      *******************************************ATTESTATIONS******************************************  Parent/Guardian is at the bedside:   [x ] Yes   [  ] No  Patient and Parent/Guardian updated as to the progress/plan of care:  [x ] Yes	[  ] No    [ ] The patient remains in critical and unstable condition, and requires ICU care and monitoring  [ ] The patient is improving but requires continued monitoring and adjustment of therapy    Total critical care time spent by attending physician (mins), excluding procedure time:  40 Today's Date:  2019    ********************************************RESPIRATORY**********************************************  RR: 16 (19 @ 06:00) (13 - 24)  SpO2: 94% (19 @ 06:00) (92% - 98%)  Wt(kg): --    Respiratory Support: room air    Respiratory Medications:          *******************************************CARDIOVASCULAR********************************************  HR: 95 (19 @ 06:00) (70 - 98)  BP: 122/57 (19 @ 20:00) (92/44 - 124/87)  Wt(kg): --  Cardiac Rhythm: a sensed V paced (DDD )    Cardiovascular Medications:  furosemide  IV Intermittent - Peds 20 milliGRAM(s) IV Intermittent every 6 hours      *********************************HEMATOLOGIC/ONCOLOGIC*******************************************  ( @ 01:20):               12.1   15.16)-----------(137                34.8   Neurophils% (auto):   81.9    manual%: x      Lymphocytes% (auto):  4.6     manual%: x      Eosinphils% (auto):   0.5     manual%: x      Bands%: x       blasts%: x        ( @ 13:09):               13.2   21.71)-----------(151                39.0   Neurophils% (auto):   79.2    manual%: 68.0   Lymphocytes% (auto):  7.6     manual%: 13.0   Eosinphils% (auto):   0.0     manual%: 1.0    Bands%: 2.0     blasts%: x          (  @ 01:23 )   PT: 14.8 SEC;   INR: 1.29   aPTT: 29.4 SEC           Hematologic/Oncologic Medications:  heparin   Infusion - Pediatric 0.054 Unit(s)/kG/Hr IV Continuous <Continuous>  heparin   Infusion - Pediatric 0.054 Unit(s)/kG/Hr IV Continuous <Continuous>      ********************************************INFECTIOUS************************************************  T(C): 37.3 (19 @ 05:00), Max: 37.3 (19 @ 05:00)  Wt(kg): --        Medications:  ceFAZolin  IV Intermittent - Peds 1860 milliGRAM(s) IV Intermittent every 8 hours      Labs:      ******************************FLUIDS/ELECTROLYTES/NUTRITION*************************************  Drug Dosing Weight  Weight (kg): 55.8 (19 @ 06:42)       Daily     I&O's Summary    05 Aug 2019 07:01  -  06 Aug 2019 07:00  --------------------------------------------------------  IN: 1754 mL / OUT: 1800 mL / NET: -46 mL        Labs:   @ 01:20    141    |  107    |  13     ----------------------------<  223    4.4     |  23     |  0.65     I.Ca:1.09  M.7   Ph:2.7         @ 13:09    141    |  104    |  11     ----------------------------<  138    3.9     |  25     |  0.64     I.Ca:x     M.7   Ph:3.7           @ 13:09  TPro  6.1     AST  21     Alb  4.2      ALT  18     TBili  0.6    AlkPhos  59     DBili  x      Trig: x          Diet:	    	  Gastrointestinal Medications:  cholecalciferol Oral Tab/Cap - Peds 2000 Unit(s) Oral daily  dextrose 5% + sodium chloride 0.9% with potassium chloride 20 mEq/L. - Pediatric 1000 milliLiter(s) IV Continuous <Continuous>      *****************************************NEUROLOGY**********************************************  [ ] LULY-1:          Standing Medications:  acetaminophen   Oral Tab/Cap - Peds. 650 milliGRAM(s) Oral every 6 hours  carBAMazepine  Oral Tab/Cap - Peds 200 milliGRAM(s) Oral daily  carBAMazepine  Oral Tab/Cap - Peds 400 milliGRAM(s) Oral daily  ketorolac Injection - Peds. 30 milliGRAM(s) IV Push every 6 hours  trihexyphenidyl Oral Tab/Cap - Peds 5 milliGRAM(s) Oral <User Schedule>    PRN Medications:  morphine  IV Intermittent - Peds 4 milliGRAM(s) IV Intermittent every 3 hours PRN Severe Pain (7 - 10)      Labs:      Adequacy of sedation and pain control has been assessed and adjusted    ************************************* OTHER MEDICATIONS ****************************************  Endocrine/Metabolic Medications:    Genitourinary Medications:    Topical/Other Medications:        *******************************PATIENT CARE ACCESS DEVICES******************************    Necessity of urinary, arterial, and venous catheters discussed    ****************************************PHYSICAL EXAM********************************************  General:	In no acute distress  Respiratory:	Lungs clear to auscultation bilaterally. fair aeration b/l decreased at bases R>L. No rales, rhonchi, retractions or wheezing. Effort even and unlabored.  CV:		Regular rate and rhythm. Normal S1/S2. 1/6 RO at LLSB, no diastolic murmur,  rubs, or gallop. Capillary refill < 2 seconds. Distal pulses 2+ and equal.  Abdomen:	Soft, non-distended. Bowel sounds present. No palpable   .		hepatosplenomegaly.  Skin:		No rash. chest tube sites c/d/i  Extremities:	Warm and well perfused. upper and lower extremity contractures,   Neurologic:	alert, interactive, , No acute change from baseline exam.      *****************************************IMAGING STUDIES*****************************************  CXR no recurrent pnumothorax with R pigtail clamped, blunting of L costophrenic angle, some atelectatics and effussion    *******************************************ATTESTATIONS******************************************  Parent/Guardian is at the bedside:   [x ] Yes   [  ] No  Patient and Parent/Guardian updated as to the progress/plan of care:  [x ] Yes	[  ] No    x[ ] The patient remains in critical and unstable condition, and requires ICU care and monitoring  [ ] The patient is improving but requires continued monitoring and adjustment of therapy    Total critical care time spent by attending physician (mins), excluding procedure time:  40 Today's Date:  2019  fluid overload last night with some mild desats to low 90s on room air this am    ********************************************RESPIRATORY**********************************************  RR: 16 (19 @ 06:00) (13 - 24)  SpO2: 94% (19 @ 06:00) (92% - 98%)  Wt(kg): --    Respiratory Support: room air    Respiratory Medications:          *******************************************CARDIOVASCULAR********************************************  HR: 95 (19 @ 06:00) (70 - 98)  BP: 122/57 (19 @ 20:00) (92/44 - 124/87)  Wt(kg): --  Cardiac Rhythm: a sensed V paced (DDD )    Cardiovascular Medications:  furosemide  IV Intermittent - Peds 20 milliGRAM(s) IV Intermittent every 6 hours      *********************************HEMATOLOGIC/ONCOLOGIC*******************************************  ( @ 01:20):               12.1   15.16)-----------(137                34.8   Neurophils% (auto):   81.9    manual%: x      Lymphocytes% (auto):  4.6     manual%: x      Eosinphils% (auto):   0.5     manual%: x      Bands%: x       blasts%: x        ( 13:09):               13.2   21.71)-----------(151                39.0   Neurophils% (auto):   79.2    manual%: 68.0   Lymphocytes% (auto):  7.6     manual%: 13.0   Eosinphils% (auto):   0.0     manual%: 1.0    Bands%: 2.0     blasts%: x          (  @ 01:23 )   PT: 14.8 SEC;   INR: 1.29   aPTT: 29.4 SEC           Hematologic/Oncologic Medications:  heparin   Infusion - Pediatric 0.054 Unit(s)/kG/Hr IV Continuous <Continuous>  heparin   Infusion - Pediatric 0.054 Unit(s)/kG/Hr IV Continuous <Continuous>      ********************************************INFECTIOUS************************************************  T(C): 37.3 (19 @ 05:00), Max: 37.3 (19 @ 05:00)  Wt(kg): --        Medications:  ceFAZolin  IV Intermittent - Peds 1860 milliGRAM(s) IV Intermittent every 8 hours      Labs:      ******************************FLUIDS/ELECTROLYTES/NUTRITION*************************************  Drug Dosing Weight  Weight (kg): 55.8 (19 @ 06:42)       Daily     I&O's Summary    05 Aug 2019 07:01  -  06 Aug 2019 07:00  --------------------------------------------------------  IN: 1754 mL / OUT: 1800 mL / NET: -46 mL        Labs:   @ 01:20    141    |  107    |  13     ----------------------------<  223    4.4     |  23     |  0.65     I.Ca:1.09  M.7   Ph:2.7         @ 13:09    141    |  104    |  11     ----------------------------<  138    3.9     |  25     |  0.64     I.Ca:x     M.7   Ph:3.7           @ 13:09  TPro  6.1     AST  21     Alb  4.2      ALT  18     TBili  0.6    AlkPhos  59     DBili  x      Trig: x          Diet:	    	  Gastrointestinal Medications:  cholecalciferol Oral Tab/Cap - Peds 2000 Unit(s) Oral daily  dextrose 5% + sodium chloride 0.9% with potassium chloride 20 mEq/L. - Pediatric 1000 milliLiter(s) IV Continuous <Continuous>      *****************************************NEUROLOGY**********************************************  [ ] LULY-1:          Standing Medications:  acetaminophen   Oral Tab/Cap - Peds. 650 milliGRAM(s) Oral every 6 hours  carBAMazepine  Oral Tab/Cap - Peds 200 milliGRAM(s) Oral daily  carBAMazepine  Oral Tab/Cap - Peds 400 milliGRAM(s) Oral daily  ketorolac Injection - Peds. 30 milliGRAM(s) IV Push every 6 hours  trihexyphenidyl Oral Tab/Cap - Peds 5 milliGRAM(s) Oral <User Schedule>    PRN Medications:  morphine  IV Intermittent - Peds 4 milliGRAM(s) IV Intermittent every 3 hours PRN Severe Pain (7 - 10)      Labs:      Adequacy of sedation and pain control has been assessed and adjusted    ************************************* OTHER MEDICATIONS ****************************************  Endocrine/Metabolic Medications:    Genitourinary Medications:    Topical/Other Medications:        *******************************PATIENT CARE ACCESS DEVICES******************************    Necessity of urinary, arterial, and venous catheters discussed    ****************************************PHYSICAL EXAM********************************************  General:	In no acute distress  Respiratory:	Lungs clear to auscultation bilaterally. fair aeration b/l decreased at bases R>L. No rales, rhonchi, retractions or wheezing. Effort even and unlabored.  CV:		Regular rate and rhythm. Normal S1/S2. 1/6 RO at LLSB, no diastolic murmur,  rubs, or gallop. Capillary refill < 2 seconds. Distal pulses 2+ and equal.  Abdomen:	Soft, non-distended. Bowel sounds present. No palpable   .		hepatosplenomegaly.  Skin:		No rash. chest tube sites c/d/i  Extremities:	Warm and well perfused. upper and lower extremity contractures,   Neurologic:	alert, interactive, , No acute change from baseline exam.      *****************************************IMAGING STUDIES*****************************************  CXR no recurrent pnumothorax with R pigtail clamped, blunting of L costophrenic angle, some atelectatics and effussion    *******************************************ATTESTATIONS******************************************  Parent/Guardian is at the bedside:   [x ] Yes   [  ] No  Patient and Parent/Guardian updated as to the progress/plan of care:  [x ] Yes	[  ] No    x[ ] The patient remains in critical and unstable condition, and requires ICU care and monitoring  [ ] The patient is improving but requires continued monitoring and adjustment of therapy    Total critical care time spent by attending physician (mins), excluding procedure time:  40

## 2019-08-07 DIAGNOSIS — Z79.01 LONG TERM (CURRENT) USE OF ANTICOAGULANTS: ICD-10-CM

## 2019-08-07 DIAGNOSIS — Z95.2 PRESENCE OF PROSTHETIC HEART VALVE: ICD-10-CM

## 2019-08-07 LAB
ANION GAP SERPL CALC-SCNC: 12 MMO/L — SIGNIFICANT CHANGE UP (ref 7–14)
APTT BLD: 27.6 SEC — SIGNIFICANT CHANGE UP (ref 27.5–36.3)
BUN SERPL-MCNC: 16 MG/DL — SIGNIFICANT CHANGE UP (ref 7–23)
CALCIUM SERPL-MCNC: 8.9 MG/DL — SIGNIFICANT CHANGE UP (ref 8.4–10.5)
CHLORIDE SERPL-SCNC: 101 MMOL/L — SIGNIFICANT CHANGE UP (ref 98–107)
CO2 SERPL-SCNC: 30 MMOL/L — SIGNIFICANT CHANGE UP (ref 22–31)
CREAT SERPL-MCNC: 0.8 MG/DL — SIGNIFICANT CHANGE UP (ref 0.5–1.3)
GLUCOSE SERPL-MCNC: 107 MG/DL — HIGH (ref 70–99)
INR BLD: 1.2 — HIGH (ref 0.88–1.17)
MAGNESIUM SERPL-MCNC: 1.9 MG/DL — SIGNIFICANT CHANGE UP (ref 1.6–2.6)
PHOSPHATE SERPL-MCNC: 1.6 MG/DL — LOW (ref 2.5–4.5)
POTASSIUM SERPL-MCNC: 3.3 MMOL/L — LOW (ref 3.5–5.3)
POTASSIUM SERPL-SCNC: 3.3 MMOL/L — LOW (ref 3.5–5.3)
PROTHROM AB SERPL-ACNC: 13.7 SEC — HIGH (ref 9.8–13.1)
SODIUM SERPL-SCNC: 143 MMOL/L — SIGNIFICANT CHANGE UP (ref 135–145)

## 2019-08-07 PROCEDURE — 71045 X-RAY EXAM CHEST 1 VIEW: CPT | Mod: 26

## 2019-08-07 PROCEDURE — 99233 SBSQ HOSP IP/OBS HIGH 50: CPT

## 2019-08-07 RX ORDER — SENNA PLUS 8.6 MG/1
1 TABLET ORAL DAILY
Refills: 0 | Status: DISCONTINUED | OUTPATIENT
Start: 2019-08-07 | End: 2019-08-08

## 2019-08-07 RX ORDER — ACETAMINOPHEN 500 MG
650 TABLET ORAL EVERY 6 HOURS
Refills: 0 | Status: DISCONTINUED | OUTPATIENT
Start: 2019-08-07 | End: 2019-08-09

## 2019-08-07 RX ORDER — FUROSEMIDE 40 MG
40 TABLET ORAL EVERY 12 HOURS
Refills: 0 | Status: DISCONTINUED | OUTPATIENT
Start: 2019-08-07 | End: 2019-08-07

## 2019-08-07 RX ORDER — FUROSEMIDE 40 MG
40 TABLET ORAL EVERY 12 HOURS
Refills: 0 | Status: DISCONTINUED | OUTPATIENT
Start: 2019-08-07 | End: 2019-08-09

## 2019-08-07 RX ORDER — ACETAMINOPHEN 500 MG
650 TABLET ORAL EVERY 6 HOURS
Refills: 0 | Status: DISCONTINUED | OUTPATIENT
Start: 2019-08-07 | End: 2019-08-07

## 2019-08-07 RX ORDER — IBUPROFEN 200 MG
400 TABLET ORAL EVERY 6 HOURS
Refills: 0 | Status: DISCONTINUED | OUTPATIENT
Start: 2019-08-07 | End: 2019-08-07

## 2019-08-07 RX ORDER — IBUPROFEN 200 MG
400 TABLET ORAL EVERY 6 HOURS
Refills: 0 | Status: DISCONTINUED | OUTPATIENT
Start: 2019-08-07 | End: 2019-08-09

## 2019-08-07 RX ORDER — FUROSEMIDE 40 MG
30 TABLET ORAL EVERY 6 HOURS
Refills: 0 | Status: DISCONTINUED | OUTPATIENT
Start: 2019-08-07 | End: 2019-08-07

## 2019-08-07 RX ORDER — CHLOROTHIAZIDE 500 MG
500 TABLET ORAL ONCE
Refills: 0 | Status: COMPLETED | OUTPATIENT
Start: 2019-08-07 | End: 2019-08-07

## 2019-08-07 RX ORDER — DOCUSATE SODIUM 100 MG
100 CAPSULE ORAL DAILY
Refills: 0 | Status: DISCONTINUED | OUTPATIENT
Start: 2019-08-07 | End: 2019-08-09

## 2019-08-07 RX ADMIN — Medication 650 MILLIGRAM(S): at 00:08

## 2019-08-07 RX ADMIN — Medication 30 MILLIGRAM(S): at 08:22

## 2019-08-07 RX ADMIN — Medication 186 MILLIGRAM(S): at 12:36

## 2019-08-07 RX ADMIN — Medication 200 MILLIGRAM(S): at 06:50

## 2019-08-07 RX ADMIN — Medication 400 MILLIGRAM(S): at 15:25

## 2019-08-07 RX ADMIN — Medication 30 MILLIGRAM(S): at 08:45

## 2019-08-07 RX ADMIN — Medication 214.32 MILLIGRAM(S): at 02:01

## 2019-08-07 RX ADMIN — Medication 8 MILLIGRAM(S): at 12:27

## 2019-08-07 RX ADMIN — Medication 4 MILLIGRAM(S): at 00:08

## 2019-08-07 RX ADMIN — Medication 100 MILLIGRAM(S): at 17:03

## 2019-08-07 RX ADMIN — Medication 5 MILLIGRAM(S): at 06:50

## 2019-08-07 RX ADMIN — Medication 30 MILLIGRAM(S): at 01:36

## 2019-08-07 RX ADMIN — Medication 400 MILLIGRAM(S): at 20:30

## 2019-08-07 RX ADMIN — Medication 6 MILLIGRAM(S): at 05:59

## 2019-08-07 RX ADMIN — Medication 650 MILLIGRAM(S): at 06:29

## 2019-08-07 RX ADMIN — Medication 400 MILLIGRAM(S): at 14:57

## 2019-08-07 RX ADMIN — Medication 650 MILLIGRAM(S): at 05:59

## 2019-08-07 RX ADMIN — Medication 2000 UNIT(S): at 09:36

## 2019-08-07 RX ADMIN — Medication 186 MILLIGRAM(S): at 03:00

## 2019-08-07 RX ADMIN — Medication 5 MILLIGRAM(S): at 17:03

## 2019-08-07 RX ADMIN — Medication 30 MILLIGRAM(S): at 01:21

## 2019-08-07 NOTE — PROGRESS NOTE PEDS - SUBJECTIVE AND OBJECTIVE BOX
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 No arrhythmias overnight on telemetry. Tolerating diet.    RESPIRATORY SUPPORT: room air    NUTRITION: regular diet    INTAKE/OUTPUT:   @ 07:01  -   @ 07:00  --------------------------------------------------------  IN: 1715 mL / OUT: 1727 mL / NET: -12 mL    CHEST TUBE OUTPUT: * mL/24h, * mL/12h    INTRAVASCULAR ACCESS: *    MEDICATIONS:  furosemide  IV Intermittent - Peds 30 milliGRAM(s) IV Intermittent every 6 hours  ceFAZolin  IV Intermittent - Peds 1860 milliGRAM(s) IV Intermittent every 8 hours  acetaminophen   Oral Tab/Cap - Peds. 650 milliGRAM(s) Oral every 6 hours  carBAMazepine  Oral Tab/Cap - Peds 200 milliGRAM(s) Oral daily  carBAMazepine  Oral Tab/Cap - Peds 400 milliGRAM(s) Oral daily  ketorolac Injection - Peds. 30 milliGRAM(s) IV Push every 6 hours  trihexyphenidyl Oral Tab/Cap - Peds 5 milliGRAM(s) Oral <User Schedule>  cholecalciferol Oral Tab/Cap - Peds 2000 Unit(s) Oral daily    PHYSICAL EXAMINATION:  Vital signs - Weight (kg): 55.8 ( @ 06:42)  T(C): 36.4 (19 @ 08:00), Max: 37 (19 @ 02:00)  HR: 76 (19 @ 08:00) (72 - 103)  BP: 132/64 (19 @ 08:00) (111/56 - 136/63)  ABP:  (90/53 - 90/53)  RR: 20 (19 @ 08:00) (13 - 21)  SpO2: 95% (19 @ 08:00) (92% - 96%)  CVP(mm Hg):  (-1 - -1)  General - non-dysmorphic appearance, well-developed, in no distress.  Skin - no rash, no desquamation, no cyanosis.  Eyes / ENT - no conjunctival injection, sclerae anicteric, external ears & nares normal, mucous membranes moist.  Pulmonary - normal inspiratory effort, no retractions, lungs clear to auscultation bilaterally, no wheezes, no rales. R chest tube in place and central packing.   Cardiovascular - normal rate, regular rhythm, normal S1 & S2, no murmurs, no rubs, no gallops, capillary refill < 2sec, normal pulses.  Gastrointestinal - soft, non-distended, non-tender, no hepatosplenomegaly  Musculoskeletal - no joint swelling, no clubbing, no edema.  Neurologic / Psychiatric - alert, oriented as age-appropriate, affect appropriate, moves all extremities, normal tone.      LABORATORY TESTS:                          12.1  CBC:   15.16 )-----------( 137   (19 @ 01:20)                          34.8               143   |  101   |  16                 Ca: 8.9    BMP:   ----------------------------< 107    M.9   (19 @ 06:45)             3.3    |  30    | 0.80               Ph: 1.6      LFT:     TPro: 6.1 / Alb: 4.2 / TBili: 0.6 / DBili: x / AST: 21 / ALT: 18 / AlkPhos: 59   (19 @ 13:09)    COAG: PT: 13.7 / PTT: 27.6 / INR: 1.20   (19 @ 06:45)       ABG:   pH: 7.44 / pCO2: 34 / pO2: 81 / HCO3: 24 / Base Excess: -0.6 / SaO2: 97.2 / Lactate: 1.5 / iCa: 1.00   (19 @ 01:20)      VBG:   pH: 7.45 / pCO2: 38 / pO2: 59 / HCO3: 26 / Base Excess: 2.2 / SaO2: 91.2   (19 @ 09:47)    IMAGING STUDIES:  Electrocardiogram - 19  Atrial sensing, ventricular sensing with 100% capture    Telemetry - normal sinus rhythm, no ectopy, no arrhythmias (Atrial sensing, ventricular sensing with 100% capture)    Chest x-ray -   Xray Chest 1 View- PORTABLE-Routine (19 @ 12:33) >  MPRESSION: The patient is status post pulmonic valve replacement. A   right cardiac device is noted as on the prior study. There is a   mediastinal drainage catheter. There is a left IJ line with the tip   within a left-sided superior vena cava.    The heart size appears enlarged. There is retrocardiac opacity likely due   to atelectasis. There may be a tiny right pneumothorax. Right-sided   pigtail pleural catheter is noted. Follow-up is recommended.        Echocardiogram:   Echocardiogram, Pediatric (19)   1. History of subaortic VSD and coarctation of aorta s/p PA band and coarctation repair by subclavian flap method. s/p PA band take down and VSD patch closure. Subaortic membrane s/p resection. Subsequently balloon dilation of recurrent coarctation. Severe pulmonary insufficiency.   2. 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   3. No evidence of neopulmonary stenosis.   4. No evidence of neopulmonary regurgitation.   5. No residual left ventricular outflow tract obstruction.   6. Mild aortic valve regurgitation.   7. Dyskinesia of the interventricular septum.   8. Dilated right ventricle.   9. Mild global hypokinesia of the right ventricle.  10. Mild global hypokinesia of the left ventricle.      19 CATH (Dr. Forde): Normal CO (3.6 L/min), RUPV draining to RSVC (just below azygous entry) w/ Qp:Qs 1.7-1.9. Free PI w/ normal RVEDp (7mmHg), dilated MPA and unobstructed branch PAs. Unobstructed aortic arch (PSEG 8mmHg) with mild hypoplasia and tortuosity of transverse arch. Patent LSVC to CS (smaller than RSVC and no bridging vein). No atrial communication. 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 Tolerating diet, has not yet had a BM. Chest tube output bloody, 112cc/24h 50cc/12h.    RESPIRATORY SUPPORT: room air    NUTRITION: regular diet    INTAKE/OUTPUT:   @ 07:01  -   @ 07:00  --------------------------------------------------------  IN: 1715 mL / OUT: 1727 mL / NET: -12 mL    CHEST TUBE OUTPUT: 112 mL/24h, 50 mL/12h    INTRAVASCULAR ACCESS: 2 PIVs    MEDICATIONS:  furosemide  IV Intermittent - Peds 30 milliGRAM(s) IV Intermittent every 6 hours  ceFAZolin  IV Intermittent - Peds 1860 milliGRAM(s) IV Intermittent every 8 hours  acetaminophen   Oral Tab/Cap - Peds. 650 milliGRAM(s) Oral every 6 hours  carBAMazepine  Oral Tab/Cap - Peds 200 milliGRAM(s) Oral daily  carBAMazepine  Oral Tab/Cap - Peds 400 milliGRAM(s) Oral daily  ketorolac Injection - Peds. 30 milliGRAM(s) IV Push every 6 hours  trihexyphenidyl Oral Tab/Cap - Peds 5 milliGRAM(s) Oral <User Schedule>  cholecalciferol Oral Tab/Cap - Peds 2000 Unit(s) Oral daily    PHYSICAL EXAMINATION:  Vital signs - Weight (kg): 55.8 ( @ 06:42)  T(C): 36.4 (19 @ 08:00), Max: 37 (19 @ 02:00)  HR: 76 (19 @ 08:00) (72 - 103)  BP: 132/64 (19 @ 08:00) (111/56 - 136/63)  ABP:  (90/53 - 90/53)  RR: 20 (19 @ 08:00) (13 - 21)  SpO2: 95% (19 @ 08:00) (92% - 96%)  CVP(mm Hg):  (-1 - -1)  General - non-dysmorphic appearance, well-developed, in no distress.  Skin - no rash, no desquamation, no cyanosis.  Eyes / ENT - no conjunctival injection, sclerae anicteric, external ears & nares normal, mucous membranes moist.  Pulmonary - normal inspiratory effort, no retractions, lungs clear to auscultation bilaterally, no wheezes, no rales. Mediastinal chest tube in place and central packing.   Cardiovascular - normal rate, regular rhythm, normal S1 & S2, no murmurs, no rubs, no gallops, capillary refill < 2sec, normal pulses.  Gastrointestinal - soft, non-distended, non-tender, no hepatosplenomegaly  Musculoskeletal - no joint swelling, no clubbing, no edema.  Neurologic / Psychiatric - alert, oriented as age-appropriate, affect appropriate, moves all extremities, normal tone.      LABORATORY TESTS:                          12.1  CBC:   15.16 )-----------( 137   (19 @ 01:20)                          34.8               143   |  101   |  16                 Ca: 8.9    BMP:   ----------------------------< 107    M.9   (19 @ 06:45)             3.3    |  30    | 0.80               Ph: 1.6      LFT:     TPro: 6.1 / Alb: 4.2 / TBili: 0.6 / DBili: x / AST: 21 / ALT: 18 / AlkPhos: 59   (19 @ 13:09)    COAG: PT: 13.7 / PTT: 27.6 / INR: 1.20   (19 @ 06:45)       ABG:   pH: 7.44 / pCO2: 34 / pO2: 81 / HCO3: 24 / Base Excess: -0.6 / SaO2: 97.2 / Lactate: 1.5 / iCa: 1.00   (19 @ 01:20)      VBG:   pH: 7.45 / pCO2: 38 / pO2: 59 / HCO3: 26 / Base Excess: 2.2 / SaO2: 91.2   (19 @ 09:47)    IMAGING STUDIES:  Electrocardiogram - 19  Atrial sensing, ventricular sensing with 100% capture    Telemetry - normal sinus rhythm, no ectopy, no arrhythmias (Atrial sensing, ventricular sensing with 100% capture)    Chest x-ray -   Xray Chest 1 View- PORTABLE-Routine (19 @ 12:33) >  IMPRESSION: The patient is status post pulmonic valve replacement. A   right cardiac device is noted as on the prior study. There is a   mediastinal drainage catheter. There is a left IJ line with the tip   within a left-sided superior vena cava.    The heart size appears enlarged. There is retrocardiac opacity likely due   to atelectasis. There may be a tiny right pneumothorax. Right-sided   pigtail pleural catheter is noted. Follow-up is recommended.    < from: Xray Chest 1 View- PORTABLE-Routine (19 @ 06:47) >  Impression: Postoperative changes as above. There is resolution of a right-sided   pneumothorax.    Interstitial prominence and shifting atelectasis are noted.      Echocardiogram:   Echocardiogram, Pediatric (19)   1. History of subaortic VSD and coarctation of aorta s/p PA band and coarctation repair by subclavian flap method. s/p PA band take down and VSD patch closure. Subaortic membrane s/p resection. Subsequently balloon dilation of recurrent coarctation. Severe pulmonary insufficiency.   2. 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   3. No evidence of neopulmonary stenosis.   4. No evidence of neopulmonary regurgitation.   5. No residual left ventricular outflow tract obstruction.   6. Mild aortic valve regurgitation.   7. Dyskinesia of the interventricular septum.   8. Dilated right ventricle.   9. Mild global hypokinesia of the right ventricle.  10. Mild global hypokinesia of the left ventricle.      19 CATH (Dr. Forde): Normal CO (3.6 L/min), RUPV draining to RSVC (just below azygous entry) w/ Qp:Qs 1.7-1.9. Free PI w/ normal RVEDp (7mmHg), dilated MPA and unobstructed branch PAs. Unobstructed aortic arch (PSEG 8mmHg) with mild hypoplasia and tortuosity of transverse arch. Patent LSVC to CS (smaller than RSVC and no bridging vein). No atrial communication. 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 Tolerating diet, has not yet had a BM. Chest tube output bloody, 112cc/24h 50cc/12h. Received Diuril overnight for positive fluid balance.    RESPIRATORY SUPPORT: room air    NUTRITION: regular diet    INTAKE/OUTPUT:   @ 07:01  -   @ 07:00  --------------------------------------------------------  IN: 1715 mL / OUT: 1727 mL / NET: -12 mL    CHEST TUBE OUTPUT: 112 mL/24h, 50 mL/12h    INTRAVASCULAR ACCESS: 2 PIVs    MEDICATIONS:  furosemide  IV Intermittent - Peds 30 milliGRAM(s) IV Intermittent every 6 hours  ceFAZolin  IV Intermittent - Peds 1860 milliGRAM(s) IV Intermittent every 8 hours  acetaminophen   Oral Tab/Cap - Peds. 650 milliGRAM(s) Oral every 6 hours  carBAMazepine  Oral Tab/Cap - Peds 200 milliGRAM(s) Oral daily  carBAMazepine  Oral Tab/Cap - Peds 400 milliGRAM(s) Oral daily  ketorolac Injection - Peds. 30 milliGRAM(s) IV Push every 6 hours  trihexyphenidyl Oral Tab/Cap - Peds 5 milliGRAM(s) Oral <User Schedule>  cholecalciferol Oral Tab/Cap - Peds 2000 Unit(s) Oral daily    PHYSICAL EXAMINATION:  Vital signs - Weight (kg): 55.8 ( @ 06:42)  T(C): 36.4 (19 @ 08:00), Max: 37 (19 @ 02:00)  HR: 76 (19 @ 08:00) (72 - 103)  BP: 132/64 (08-07-19 @ 08:00) (111/56 - 136/63)  ABP:  (90/53 - 90/53)  RR: 20 (19 @ 08:00) (13 - 21)  SpO2: 95% (19 @ 08:00) (92% - 96%)  CVP(mm Hg):  (-1 - -1)  General - non-dysmorphic appearance, well-developed, in no distress.  Skin - no rash, no desquamation, no cyanosis.  Eyes / ENT - no conjunctival injection, sclerae anicteric, external ears & nares normal, mucous membranes moist.  Pulmonary - normal inspiratory effort, no retractions, lungs clear to auscultation bilaterally, no wheezes, no rales. Mediastinal chest tube in place and central packing.   Cardiovascular - normal rate, regular rhythm, normal S1 & S2, no murmurs, no rubs, no gallops, capillary refill < 2sec, normal pulses.  Gastrointestinal - soft, non-distended, non-tender, no hepatosplenomegaly  Musculoskeletal - no joint swelling, no clubbing, no edema.  Neurologic / Psychiatric - alert, oriented as age-appropriate, affect appropriate, moves all extremities, normal tone.      LABORATORY TESTS:                          12.1  CBC:   15.16 )-----------( 137   (19 @ 01:20)                          34.8               143   |  101   |  16                 Ca: 8.9    BMP:   ----------------------------< 107    M.9   (19 @ 06:45)             3.3    |  30    | 0.80               Ph: 1.6      LFT:     TPro: 6.1 / Alb: 4.2 / TBili: 0.6 / DBili: x / AST: 21 / ALT: 18 / AlkPhos: 59   (19 @ 13:09)    COAG: PT: 13.7 / PTT: 27.6 / INR: 1.20   (19 @ 06:45)       ABG:   pH: 7.44 / pCO2: 34 / pO2: 81 / HCO3: 24 / Base Excess: -0.6 / SaO2: 97.2 / Lactate: 1.5 / iCa: 1.00   (19 @ 01:20)      VBG:   pH: 7.45 / pCO2: 38 / pO2: 59 / HCO3: 26 / Base Excess: 2.2 / SaO2: 91.2   (19 @ 09:47)    IMAGING STUDIES:  Electrocardiogram - 19  Atrial sensing, ventricular sensing with 100% capture    Telemetry - normal sinus rhythm, no ectopy, no arrhythmias (Atrial sensing, ventricular sensing with 100% capture)    Chest x-ray -   Xray Chest 1 View- PORTABLE-Routine (19 @ 12:33) >  IMPRESSION: The patient is status post pulmonic valve replacement. A   right cardiac device is noted as on the prior study. There is a   mediastinal drainage catheter. There is a left IJ line with the tip   within a left-sided superior vena cava.    The heart size appears enlarged. There is retrocardiac opacity likely due   to atelectasis. There may be a tiny right pneumothorax. Right-sided   pigtail pleural catheter is noted. Follow-up is recommended.    < from: Xray Chest 1 View- PORTABLE-Routine (19 @ 06:47) >  Impression: Postoperative changes as above. There is resolution of a right-sided   pneumothorax.    Interstitial prominence and shifting atelectasis are noted.      Echocardiogram:   Echocardiogram, Pediatric (19)   1. History of subaortic VSD and coarctation of aorta s/p PA band and coarctation repair by subclavian flap method. s/p PA band take down and VSD patch closure. Subaortic membrane s/p resection. Subsequently balloon dilation of recurrent coarctation. Severe pulmonary insufficiency.   2. 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   3. No evidence of neopulmonary stenosis.   4. No evidence of neopulmonary regurgitation.   5. No residual left ventricular outflow tract obstruction.   6. Mild aortic valve regurgitation.   7. Dyskinesia of the interventricular septum.   8. Dilated right ventricle.   9. Mild global hypokinesia of the right ventricle.  10. Mild global hypokinesia of the left ventricle.      19 CATH (Dr. Forde): Normal CO (3.6 L/min), RUPV draining to RSVC (just below azygous entry) w/ Qp:Qs 1.7-1.9. Free PI w/ normal RVEDp (7mmHg), dilated MPA and unobstructed branch PAs. Unobstructed aortic arch (PSEG 8mmHg) with mild hypoplasia and tortuosity of transverse arch. Patent LSVC to CS (smaller than RSVC and no bridging vein). No atrial communication. 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 Tolerating diet, has not yet had a BM. Chest tube output bloody, 112cc/24h 50cc/12h. Received Diuril overnight for positive fluid balance.    RESPIRATORY SUPPORT: room air    NUTRITION: regular diet    INTAKE/OUTPUT:   @ 07:01  -   @ 07:00  --------------------------------------------------------  IN: 1715 mL / OUT: 1727 mL / NET: -12 mL    CHEST TUBE OUTPUT: 112 mL/24h, 50 mL/12h    INTRAVASCULAR ACCESS: 2 PIVs    MEDICATIONS:  furosemide  IV Intermittent - Peds 30 milliGRAM(s) IV Intermittent every 6 hours  ceFAZolin  IV Intermittent - Peds 1860 milliGRAM(s) IV Intermittent every 8 hours  acetaminophen   Oral Tab/Cap - Peds. 650 milliGRAM(s) Oral every 6 hours  carBAMazepine  Oral Tab/Cap - Peds 200 milliGRAM(s) Oral daily  carBAMazepine  Oral Tab/Cap - Peds 400 milliGRAM(s) Oral daily  ketorolac Injection - Peds. 30 milliGRAM(s) IV Push every 6 hours  trihexyphenidyl Oral Tab/Cap - Peds 5 milliGRAM(s) Oral <User Schedule>  cholecalciferol Oral Tab/Cap - Peds 2000 Unit(s) Oral daily    PHYSICAL EXAMINATION:  Vital signs - Weight (kg): 55.8 ( @ 06:42)  T(C): 36.4 (19 @ 08:00), Max: 37 (19 @ 02:00)  HR: 76 (19 @ 08:00) (72 - 103)  BP: 132/64 (08-07-19 @ 08:00) (111/56 - 136/63)  ABP:  (90/53 - 90/53)  RR: 20 (19 @ 08:00) (13 - 21)  SpO2: 95% (19 @ 08:00) (92% - 96%)  CVP(mm Hg):  (-1 - -1)  General - well-developed, in no distress.  Skin - no rash, no desquamation, no cyanosis.  Pulmonary - normal inspiratory effort, no retractions, lungs clear to auscultation bilaterally, no wheezes, no rales. Mediastinal chest tube in place and central packing.   Cardiovascular - normal rate, regular rhythm, normal S1 & S2, no murmurs, no rubs, no gallops, capillary refill < 2sec, normal pulses.  Gastrointestinal - soft, non-distended, non-tender, no hepatosplenomegaly  Musculoskeletal - no joint swelling, no clubbing, no edema  Neurologic / Psychiatric - alert, oriented as age-appropriate, affect appropriate, moves all extremities, normal tone.      LABORATORY TESTS:                          12.1  CBC:   15.16 )-----------( 137   (19 @ 01:20)                          34.8               143   |  101   |  16                 Ca: 8.9    BMP:   ----------------------------< 107    M.9   (19 @ 06:45)             3.3    |  30    | 0.80               Ph: 1.6      LFT:     TPro: 6.1 / Alb: 4.2 / TBili: 0.6 / DBili: x / AST: 21 / ALT: 18 / AlkPhos: 59   (19 @ 13:09)    COAG: PT: 13.7 / PTT: 27.6 / INR: 1.20   (19 @ 06:45)       ABG:   pH: 7.44 / pCO2: 34 / pO2: 81 / HCO3: 24 / Base Excess: -0.6 / SaO2: 97.2 / Lactate: 1.5 / iCa: 1.00   (19 @ 01:20)      VBG:   pH: 7.45 / pCO2: 38 / pO2: 59 / HCO3: 26 / Base Excess: 2.2 / SaO2: 91.2   (19 @ 09:47)    IMAGING STUDIES:  Electrocardiogram - 19  Atrial sensing, ventricular sensing with 100% capture    Telemetry - normal sinus rhythm, no ectopy, no arrhythmias (Atrial sensing, ventricular sensing with 100% capture)    Chest x-ray -   Xray Chest 1 View- PORTABLE-Routine (19 @ 12:33) >  IMPRESSION: The patient is status post pulmonic valve replacement. A   right cardiac device is noted as on the prior study. There is a   mediastinal drainage catheter. There is a left IJ line with the tip   within a left-sided superior vena cava.    The heart size appears enlarged. There is retrocardiac opacity likely due   to atelectasis. There may be a tiny right pneumothorax. Right-sided   pigtail pleural catheter is noted. Follow-up is recommended.    < from: Xray Chest 1 View- PORTABLE-Routine (19 @ 06:47) >  Impression: Postoperative changes as above. There is resolution of a right-sided   pneumothorax.    Interstitial prominence and shifting atelectasis are noted.      Echocardiogram:   Echocardiogram, Pediatric (19)   1. History of subaortic VSD and coarctation of aorta s/p PA band and coarctation repair by subclavian flap method. s/p PA band take down and VSD patch closure. Subaortic membrane s/p resection. Subsequently balloon dilation of recurrent coarctation. Severe pulmonary insufficiency.   2. 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   3. No evidence of neopulmonary stenosis.   4. No evidence of neopulmonary regurgitation.   5. No residual left ventricular outflow tract obstruction.   6. Mild aortic valve regurgitation.   7. Dyskinesia of the interventricular septum.   8. Dilated right ventricle.   9. Mild global hypokinesia of the right ventricle.  10. Mild global hypokinesia of the left ventricle.      19 CATH (Dr. Forde): Normal CO (3.6 L/min), RUPV draining to RSVC (just below azygous entry) w/ Qp:Qs 1.7-1.9. Free PI w/ normal RVEDp (7mmHg), dilated MPA and unobstructed branch PAs. Unobstructed aortic arch (PSEG 8mmHg) with mild hypoplasia and tortuosity of transverse arch. Patent LSVC to CS (smaller than RSVC and no bridging vein). No atrial communication.

## 2019-08-07 NOTE — PROGRESS NOTE PEDS - SUBJECTIVE AND OBJECTIVE BOX
Interval/Overnight Events:  _________________________________________________________________  Respiratory:      _________________________________________________________________  Cardiac:  Cardiac Rhythm: Sinus rhythm    furosemide  IV Intermittent - Peds 30 milliGRAM(s) IV Intermittent every 6 hours    _________________________________________________________________  Hematologic:      ________________________________________________________________  Infectious:    ceFAZolin  IV Intermittent - Peds 1860 milliGRAM(s) IV Intermittent every 8 hours    RECENT CULTURES:      ________________________________________________________________  Fluids/Electrolytes/Nutrition:  I&O's Summary    06 Aug 2019 07:01  -  07 Aug 2019 07:00  --------------------------------------------------------  IN: 1715 mL / OUT: 1727 mL / NET: -12 mL      Diet:    cholecalciferol Oral Tab/Cap - Peds 2000 Unit(s) Oral daily    _________________________________________________________________  Neurologic:  Adequacy of sedation and pain control has been assessed and adjusted    acetaminophen   Oral Tab/Cap - Peds. 650 milliGRAM(s) Oral every 6 hours  carBAMazepine  Oral Tab/Cap - Peds 200 milliGRAM(s) Oral daily  carBAMazepine  Oral Tab/Cap - Peds 400 milliGRAM(s) Oral daily  ketorolac Injection - Peds. 30 milliGRAM(s) IV Push every 6 hours  morphine  IV Intermittent - Peds 4 milliGRAM(s) IV Intermittent every 3 hours PRN  trihexyphenidyl Oral Tab/Cap - Peds 5 milliGRAM(s) Oral <User Schedule>    ________________________________________________________________  Additional Meds:      ________________________________________________________________  Access:    Necessity of urinary, arterial, and venous catheters discussed  ________________________________________________________________  Labs:  ABG - ( 06 Aug 2019 01:20 )  pH: 7.44  /  pCO2: 34    /  pO2: 81    / HCO3: 24    / Base Excess: -0.6  /  SaO2: 97.2  / Lactate: 1.5    VBG - ( 05 Aug 2019 09:47 )  pH: 7.45  /  pCO2: 38    /  pO2: 59    / HCO3: 26    / Base Excess: 2.2   /  SvO2: 91.2  / Lactate: x          _________________________________________________________________  Imaging:    _________________________________________________________________  PE:  T(C): 36.1 (08-07-19 @ 05:00), Max: 37.2 (08-06-19 @ 08:00)  HR: 72 (08-07-19 @ 05:00) (72 - 103)  BP: 123/49 (08-07-19 @ 05:00) (111/56 - 136/63)  ABP: 90/53 (08-06-19 @ 11:00) (90/53 - 102/62)  ABP(mean): 67 (08-06-19 @ 11:00) (67 - 76)  RR: 13 (08-07-19 @ 05:00) (13 - 29)  SpO2: 95% (08-07-19 @ 05:00) (92% - 96%)  CVP(mm Hg): -1 (08-06-19 @ 11:00) (-1 - 6)    General:	In no acute distress  Respiratory:	Lungs clear to auscultation bilaterally. fair aeration b/l decreased at bases R>L. No rales, rhonchi, retractions or wheezing. Effort even and unlabored.  CV:		Regular rate and rhythm. Normal S1/S2. 1/6 RO at LLSB, no diastolic murmur,  rubs, or gallop. Capillary refill < 2 seconds. Distal pulses 2+ and equal.  Abdomen:	Soft, non-distended. Bowel sounds present. No palpable   .		hepatosplenomegaly.  Skin:		No rash. chest tube sites c/d/i  Extremities:	Warm and well perfused. upper and lower extremity contractures,   Neurologic:	alert, interactive, , No acute change from baseline exam.      ________________________________________________________________  Patient and Parent/Guardian was updated as to the progress/plan of care.    The patient remains in critical and unstable condition, and requires ICU care and monitoring. Total critical care time spent by attending physician was minutes, excluding procedure time.    The patient is improving but requires continued monitoring and adjustment of therapy. Interval/Overnight Events: increased diuretics overnight  _________________________________________________________________  Respiratory:  room air    RCT_____112___  _________________________________________________________________  Cardiac:  Cardiac Rhythm: A sense V pace (DDD )    furosemide  IV Intermittent - Peds 30 milliGRAM(s) IV Intermittent every 6 hours    _________________________________________________________________  Hematologic:      ________________________________________________________________  Infectious:    ceFAZolin  IV Intermittent - Peds 1860 milliGRAM(s) IV Intermittent every 8 hours    RECENT CULTURES:      ________________________________________________________________  Fluids/Electrolytes/Nutrition:  I&O's Summary    06 Aug 2019 07:01  -  07 Aug 2019 07:00  --------------------------------------------------------  IN: 1715 mL / OUT: 1727 mL / NET: -12 mL      Diet:    cholecalciferol Oral Tab/Cap - Peds 2000 Unit(s) Oral daily    _________________________________________________________________  Neurologic:  Adequacy of sedation and pain control has been assessed and adjusted    acetaminophen   Oral Tab/Cap - Peds. 650 milliGRAM(s) Oral every 6 hours  carBAMazepine  Oral Tab/Cap - Peds 200 milliGRAM(s) Oral daily  carBAMazepine  Oral Tab/Cap - Peds 400 milliGRAM(s) Oral daily  ketorolac Injection - Peds. 30 milliGRAM(s) IV Push every 6 hours  morphine  IV Intermittent - Peds 4 milliGRAM(s) IV Intermittent every 3 hours PRN  trihexyphenidyl Oral Tab/Cap - Peds 5 milliGRAM(s) Oral <User Schedule>    ________________________________________________________________  Additional Meds:      ________________________________________________________________  Access:    Necessity of urinary, arterial, and venous catheters discussed  ________________________________________________________________  Labs:  ABG - ( 06 Aug 2019 01:20 )  pH: 7.44  /  pCO2: 34    /  pO2: 81    / HCO3: 24    / Base Excess: -0.6  /  SaO2: 97.2  / Lactate: 1.5    VBG - ( 05 Aug 2019 09:47 )  pH: 7.45  /  pCO2: 38    /  pO2: 59    / HCO3: 26    / Base Excess: 2.2   /  SvO2: 91.2  / Lactate: x          _________________________________________________________________  Imaging:  CXR- no effusions or consolidation, stable chest tubes and wires  _________________________________________________________________  PE:  T(C): 36.1 (08-07-19 @ 05:00), Max: 37.2 (08-06-19 @ 08:00)  HR: 72 (08-07-19 @ 05:00) (72 - 103)  BP: 123/49 (08-07-19 @ 05:00) (111/56 - 136/63)  ABP: 90/53 (08-06-19 @ 11:00) (90/53 - 102/62)  ABP(mean): 67 (08-06-19 @ 11:00) (67 - 76)  RR: 13 (08-07-19 @ 05:00) (13 - 29)  SpO2: 95% (08-07-19 @ 05:00) (92% - 96%)  CVP(mm Hg): -1 (08-06-19 @ 11:00) (-1 - 6)    General:	In no acute distress  Respiratory:	Lungs clear to auscultation bilaterally, good aeration b/l. No rales, rhonchi, retractions or wheezing. Effort even and unlabored.  CV:		Regular rate and rhythm. Normal S1/S2. 2/6 RO at LLSB, no diastolic murmur,  rubs, or gallop. Capillary refill < 2 seconds. Distal pulses 2+ and equal.  Abdomen:	Soft, non-distended. Bowel sounds present. No palpable   .		hepatosplenomegaly.  Skin:		No rash. chest tube sites c/d/i  Extremities:	Warm and well perfused. upper and lower extremity contractures,   Neurologic:	alert, interactive, , No acute change from baseline exam.      ________________________________________________________________  Patient and Parent/Guardian was updated as to the progress/plan of care.

## 2019-08-07 NOTE — PROGRESS NOTE PEDS - ASSESSMENT
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 He is doing well post-operatively extubated, on no vasoactive meds. He has underlying heart block for which he has an epicardial DDD  pacemaker,   Prior cardiac surgery included VSD and CoA repair. 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    Doing well post-operatively de-intensifying care with close monitoring  pulmonary toliet and IS for desats and atelectatsis, (goal sats normal >92% room air)  lasix 20mg IV Q6 for goal negative balance  no other vasoactive medications  R CT no air leak no recurrent pneumo on clamping, will remove today  LCT to remain in place to monitor output  CXR in am to asses lung parenchyma and CT  has epicardial DDD pacemaker   reg diet no IVF  K>3.5, Mg>2, iCa> 1.2  pain control with toradol, tylenol morphine as neededANDREY/hollis/minh out  pain control with toradol, tylenol morphine prn    chem and INR am   coumadin QHS to start tomorrow for PV anticoagulation for valve 35 y/o with CP, autism, hx CVA, now status post  pulmonary valve replacement (27 mm bovine pericardial valve) due to free PI on 8/5/19. He is doing well post-operatively extubated, on no vasoactive meds. He has underlying heart block for which he has an epicardial DDD  pacemaker,   Prior cardiac surgery included VSD and CoA repair. 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 He is doing well post-operatively extubated, on no vasoactive meds. He has underlying heart block for which he has an epicardial DDD  pacemaker,   Prior cardiac surgery included VSD and CoA repair. 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

## 2019-08-07 NOTE — PROGRESS NOTE PEDS - ASSESSMENT
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 He had  total bypass time was 40 minutes and came to the ICU extubated and no vasoactive medications. The patient is critically ill in this postoperative period, and requires ongoing ICU monitoring for risk of cardiorespiratory compromise.    CV:  - Continuous cardiopulmonary/telemetry monitoring.  - Goal MAP > 60  - No EKG needed today   - Careful monitoring of chest tube output. Notify cardiology if >100cc/hr, or if abrupt cessation of output.  - Remove R pleural chest tube/a-line and IJ  - Lasix 20mg q6h, to titrate to a negative fluid balance.   - s/p lasix 10mg, x2 doses  - Right sided transvenous, dual chamber pacing system in place- DDD, Lower rate 50 and upper rate of 150 bmp.     RESP:  - Follow ABGs and lactate and Goal SpO2 > 92%.    FEN/GI:  - Strict electrolyte control; maintain K ~3.5, Mg ~2.0, and iCa ~1-1.2. Total fluids ~80% maintenance.  - Careful monitoring of urine output, goal > 1cc/kg/hr    ID:  - Perioperative Ancef. Maintain normothermia.    HEME:  - Blood products as needed, as per transfusion protocol.  - Start Coumadin tomorrow AM.     NEURO/PAIN:  - Provide adequate sedation and pain control with Toradol and morphine. 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 He had total bypass time was 40 minutes and came to the ICU extubated and no vasoactive medications. The patient is critically ill in this postoperative period, and requires ongoing ICU monitoring for risk of cardiorespiratory compromise.    CV:  - Continuous cardiopulmonary/telemetry monitoring.  - Goal MAP > 60  - No EKG needed today   - Careful monitoring of chest tube output. Notify cardiology if >100cc/hr, or if abrupt cessation of output.  - R pleural chest tube/a-line and IJ removed 8/6  - Mediastinal chest tube to be left in place (due to bloody secretions)  - Change lasix to 40mg po bid, goal negative fluid balance (-200)  - s/p lasix 20mg q6h  - s/p lasix 10mg, x2 doses  - Right sided transvenous, dual chamber pacing system in place- DDD, Lower rate 50 and upper rate of 150 bmp.   - Echocardiogram tomorrow, following chest tube removal  - CXR tomorrow, following chest tube removal    RESP:  - Follow ABGs and lactate and Goal SpO2 > 92%.    FEN/GI:  - Strict electrolyte control; maintain K ~3.5, Mg ~2.0, and iCa ~1-1.2. Total fluids ~80% maintenance.  - Careful monitoring of urine output, goal > 1cc/kg/hr    ID:  - Perioperative Ancef. Maintain normothermia.    HEME:  - Blood products as needed, as per transfusion protocol.  - INR tomorrow AM  - Start Coumadin tomorrow AM.    NEURO/PAIN:  - Provide adequate sedation and pain control with Toradol and morphine.

## 2019-08-08 ENCOUNTER — RX RENEWAL (OUTPATIENT)
Age: 34
End: 2019-08-08

## 2019-08-08 ENCOUNTER — TRANSCRIPTION ENCOUNTER (OUTPATIENT)
Age: 34
End: 2019-08-08

## 2019-08-08 LAB
APTT BLD: 27.8 SEC — SIGNIFICANT CHANGE UP (ref 27.5–36.3)
INR BLD: 1.11 — SIGNIFICANT CHANGE UP (ref 0.88–1.17)
PROTHROM AB SERPL-ACNC: 12.7 SEC — SIGNIFICANT CHANGE UP (ref 9.8–13.1)

## 2019-08-08 PROCEDURE — 93320 DOPPLER ECHO COMPLETE: CPT | Mod: 26

## 2019-08-08 PROCEDURE — 71045 X-RAY EXAM CHEST 1 VIEW: CPT | Mod: 26

## 2019-08-08 PROCEDURE — 99233 SBSQ HOSP IP/OBS HIGH 50: CPT

## 2019-08-08 PROCEDURE — 93303 ECHO TRANSTHORACIC: CPT | Mod: 26

## 2019-08-08 PROCEDURE — 93325 DOPPLER ECHO COLOR FLOW MAPG: CPT | Mod: 26

## 2019-08-08 RX ORDER — SENNA PLUS 8.6 MG/1
2 TABLET ORAL DAILY
Refills: 0 | Status: DISCONTINUED | OUTPATIENT
Start: 2019-08-08 | End: 2019-08-08

## 2019-08-08 RX ORDER — SENNA PLUS 8.6 MG/1
1 TABLET ORAL ONCE
Refills: 0 | Status: COMPLETED | OUTPATIENT
Start: 2019-08-08 | End: 2019-08-08

## 2019-08-08 RX ORDER — FUROSEMIDE 40 MG
1 TABLET ORAL
Qty: 60 | Refills: 1
Start: 2019-08-08 | End: 2019-10-06

## 2019-08-08 RX ORDER — WARFARIN SODIUM 2.5 MG/1
5 TABLET ORAL DAILY
Refills: 0 | Status: DISCONTINUED | OUTPATIENT
Start: 2019-08-08 | End: 2019-08-08

## 2019-08-08 RX ORDER — WARFARIN SODIUM 2.5 MG/1
5 TABLET ORAL ONCE
Refills: 0 | Status: COMPLETED | OUTPATIENT
Start: 2019-08-08 | End: 2019-08-08

## 2019-08-08 RX ORDER — POLYETHYLENE GLYCOL 3350 17 G/17G
17 POWDER, FOR SOLUTION ORAL DAILY
Refills: 0 | Status: DISCONTINUED | OUTPATIENT
Start: 2019-08-08 | End: 2019-08-09

## 2019-08-08 RX ORDER — SENNA PLUS 8.6 MG/1
2 TABLET ORAL DAILY
Refills: 0 | Status: DISCONTINUED | OUTPATIENT
Start: 2019-08-09 | End: 2019-08-09

## 2019-08-08 RX ADMIN — SENNA PLUS 1 TABLET(S): 8.6 TABLET ORAL at 09:15

## 2019-08-08 RX ADMIN — POLYETHYLENE GLYCOL 3350 17 GRAM(S): 17 POWDER, FOR SOLUTION ORAL at 18:36

## 2019-08-08 RX ADMIN — Medication 400 MILLIGRAM(S): at 20:10

## 2019-08-08 RX ADMIN — Medication 40 MILLIGRAM(S): at 23:06

## 2019-08-08 RX ADMIN — Medication 2000 UNIT(S): at 09:15

## 2019-08-08 RX ADMIN — Medication 40 MILLIGRAM(S): at 12:41

## 2019-08-08 RX ADMIN — SENNA PLUS 1 TABLET(S): 8.6 TABLET ORAL at 12:21

## 2019-08-08 RX ADMIN — WARFARIN SODIUM 5 MILLIGRAM(S): 2.5 TABLET ORAL at 20:10

## 2019-08-08 RX ADMIN — Medication 200 MILLIGRAM(S): at 07:22

## 2019-08-08 RX ADMIN — Medication 5 MILLIGRAM(S): at 07:22

## 2019-08-08 RX ADMIN — Medication 40 MILLIGRAM(S): at 00:13

## 2019-08-08 RX ADMIN — Medication 5 MILLIGRAM(S): at 16:58

## 2019-08-08 NOTE — PROGRESS NOTE PEDS - ASSESSMENT
33 y/o with CP, autism, hx CVA, now status post  pulmonary valve replacement (27 mm bovine pericardial valve) due to free PI on 8/5/19. He is doing well post-operatively extubated, on no vasoactive meds. He has underlying heart block for which he has an epicardial DDD  pacemaker,   Prior cardiac surgery included VSD and CoA repair. 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 He is doing well post-operatively extubated, on no vasoactive meds. He has underlying heart block for which he has an epicardial DDD  pacemaker,   Prior cardiac surgery included VSD and CoA repair. 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 Awaiting bowel movement and coumdin initiation prior to discharge.    lasix 40mg po BID today (home med)  coumadin tonight 5mg (goal INR 2-2.5)  inr in am   discharge echo   bowel reg with diet, needs bowel movement prior to discharge  oral pain control meds  IS/PT  has epicardial DDD pacemaker   dispo for tomorrow

## 2019-08-08 NOTE — PROGRESS NOTE PEDS - ASSESSMENT
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 He had total bypass time was 40 minutes and came to the ICU extubated and no vasoactive medications. The patient is critically ill in this postoperative period, and requires ongoing ICU monitoring for risk of cardiorespiratory compromise.    CV:  - Continuous cardiopulmonary/telemetry monitoring.  - Goal MAP > 60  - No EKG needed today  - R pleural chest tube/a-line and IJ removed 8/7, mediastinal chest tube removed 8/8  - Continue lasix 40mg po bid, goal negative fluid balance (-200)  - s/p lasix 20mg q6h  - s/p lasix 10mg, x2 doses  - Right sided transvenous, dual chamber pacing system in place- DDD, Lower rate 50 and upper rate of 150 bmp.   - Echocardiogram today  - CXR today    RESP:  - Follow ABGs and lactate and Goal SpO2 > 92%.    FEN/GI:  - Strict electrolyte control; maintain K ~3.5, Mg ~2.0, and iCa ~1-1.2. Total fluids ~80% maintenance.  - Careful monitoring of urine output, goal > 1cc/kg/hr  - 2 tabs senna    ID:  - Perioperative Ancef. Maintain normothermia.    HEME:  - Blood products as needed, as per transfusion protocol.  - Start Coumadin 5mg tonight  - Repeat INR tomorrow AM    NEURO/PAIN:  - Provide adequate sedation and pain control with Toradol and morphine. 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 He had total bypass time was 40 minutes and came to the ICU extubated and no vasoactive medications. The patient is critically ill in this postoperative period, and requires ongoing ICU monitoring for risk of cardiorespiratory compromise.    CV:  - Continuous cardiopulmonary/telemetry monitoring.  - Goal MAP > 60  - No EKG needed today  - R pleural chest tube/a-line and IJ removed 8/7, mediastinal chest tube removed 8/8  - Continue lasix 40mg po bid, goal negative fluid balance (-200)  - s/p lasix 20mg q6h  - s/p lasix 10mg, x2 doses  - Right sided transvenous, dual chamber pacing system in place- DDD, Lower rate 50 and upper rate of 150 bmp.   - Echocardiogram today      RESP:  - Follow ABGs and lactate and Goal SpO2 > 92%.    FEN/GI:  - Strict electrolyte control; maintain K ~3.5, Mg ~2.0, and iCa ~1-1.2. Total fluids ~80% maintenance.  - Careful monitoring of urine output, goal > 1cc/kg/hr  - 2 tabs senna    ID:  - Perioperative Ancef. Maintain normothermia.    HEME:  - Blood products as needed, as per transfusion protocol.  - Start Coumadin 5mg tonight  - Repeat INR tomorrow AM    NEURO/PAIN:  - Provide adequate sedation and pain control with Toradol and morphine. 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 He had total bypass time was 40 minutes and came to the ICU extubated and no vasoactive medications. The patient is critically ill in this postoperative period, and requires ongoing ICU monitoring for risk of cardiorespiratory compromise.    CV:  - Continuous cardiopulmonary/telemetry monitoring.  - Goal MAP > 60  - No EKG needed today  - R pleural chest tube/a-line and IJ removed 8/7, mediastinal chest tube removed 8/8  - Continue lasix 40mg po bid, goal negative fluid balance (-200)  - s/p lasix 20mg q6h  - s/p lasix 10mg, x2 doses  - Right sided transvenous, dual chamber pacing system in place- DDD, Lower rate 50 and upper rate of 150 bmp.   - Echocardiogram today (8/8/2019)      RESP:  - Follow ABGs and lactate and Goal SpO2 > 92%.    FEN/GI:  - Strict electrolyte control; maintain K ~3.5, Mg ~2.0, and iCa ~1-1.2. Total fluids ~80% maintenance.  - Careful monitoring of urine output, goal > 1cc/kg/hr  - 2 tabs senna    ID:  - Perioperative Ancef. Maintain normothermia.    HEME:  - Blood products as needed, as per transfusion protocol.  - Start Coumadin 5mg tonight  - Repeat INR tomorrow AM    NEURO/PAIN:  - Provide adequate sedation and pain control with Toradol and morphine. 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 He had total bypass time was 40 minutes and came to the ICU extubated and no vasoactive medications. The patient is critically ill in this postoperative period, and requires ongoing ICU monitoring for risk of cardiorespiratory compromise.    CV:  - Continuous cardiopulmonary/telemetry monitoring.  - Goal MAP > 60  - No EKG needed today  - R pleural chest tube/a-line and IJ removed 8/7, mediastinal chest tube removed 8/8  - Continue lasix 40mg po bid, goal negative fluid balance (-200)  - s/p lasix 20mg q6h  - s/p lasix 10mg, x2 doses  - Right sided transvenous, dual chamber pacing system in place- DDD, Lower rate 50 and upper rate of 150 bmp.   - Echocardiogram today (8/8/2019)      RESP:  - Follow ABGs and lactate and Goal SpO2 > 92%.    FEN/GI:  - Strict electrolyte control; maintain K ~3.5, Mg ~2.0, and iCa ~1-1.2. Total fluids ~80% maintenance.  - Careful monitoring of urine output, goal > 1cc/kg/hr  - 2 tabs senna    ID:  - Perioperative Ancef. Maintain normothermia.    HEME:  - Blood products as needed, as per transfusion protocol.  - Start Coumadin 5mg tonight  - Repeat INR tomorrow AM    NEURO/PAIN:  - Provide adequate sedation and pain control with Toradol and morphine.     Follow up appointment - 8/16/19 at 11 :30am with  Dr. De Luna(cardiologist) & Keo (Cardiac surgeon).

## 2019-08-08 NOTE — PROGRESS NOTE PEDS - SUBJECTIVE AND OBJECTIVE BOX
Interval/Overnight Events:  _________________________________________________________________  Respiratory:      _________________________________________________________________  Cardiac:  Cardiac Rhythm: Sinus rhythm    furosemide   Oral Tab/Cap - Peds 40 milliGRAM(s) Oral every 12 hours    _________________________________________________________________  Hematologic:      ________________________________________________________________  Infectious:      RECENT CULTURES:      ________________________________________________________________  Fluids/Electrolytes/Nutrition:  I&O's Summary    07 Aug 2019 07:01  -  08 Aug 2019 07:00  --------------------------------------------------------  IN: 720 mL / OUT: 1075 mL / NET: -355 mL      Diet:    cholecalciferol Oral Tab/Cap - Peds 2000 Unit(s) Oral daily  docusate sodium Oral Tab/Cap - Peds 100 milliGRAM(s) Oral daily  senna 15 milliGRAM(s) Oral Chewable Tablet - Peds 1 Tablet(s) Chew daily    _________________________________________________________________  Neurologic:  Adequacy of sedation and pain control has been assessed and adjusted    acetaminophen   Oral Tab/Cap - Peds. 650 milliGRAM(s) Oral every 6 hours PRN  carBAMazepine  Oral Tab/Cap - Peds 200 milliGRAM(s) Oral daily  carBAMazepine  Oral Tab/Cap - Peds 400 milliGRAM(s) Oral daily  ibuprofen  Oral Tab/Cap - Peds. 400 milliGRAM(s) Oral every 6 hours PRN  trihexyphenidyl Oral Tab/Cap - Peds 5 milliGRAM(s) Oral <User Schedule>    ________________________________________________________________  Additional Meds:      ________________________________________________________________  Access:    Necessity of urinary, arterial, and venous catheters discussed  ________________________________________________________________  Labs:      _________________________________________________________________  Imaging:    _________________________________________________________________  PE:  T(C): 36.7 (08-08-19 @ 05:00), Max: 36.8 (08-07-19 @ 17:00)  HR: 80 (08-08-19 @ 05:00) (72 - 85)  BP: 129/57 (08-08-19 @ 02:00) (122/57 - 135/56)  ABP: --  ABP(mean): --  RR: 13 (08-08-19 @ 05:00) (13 - 20)  SpO2: 94% (08-08-19 @ 05:00) (94% - 95%)  CVP(mm Hg): --      General:	In no acute distress  Respiratory:	Lungs clear to auscultation bilaterally, good aeration b/l. No rales, rhonchi, retractions or wheezing. Effort even and unlabored.  CV:		Regular rate and rhythm. Normal S1/S2. 2/6 RO at LLSB, no diastolic murmur,  rubs, or gallop. Capillary refill < 2 seconds. Distal pulses 2+ and equal.  Abdomen:	Soft, non-distended. Bowel sounds present. No palpable   .		hepatosplenomegaly.  Skin:		No rash. chest tube sites c/d/i  Extremities:	Warm and well perfused. upper and lower extremity contractures,   Neurologic:	alert, interactive, , No acute change from baseline exam.    ________________________________________________________________  Patient and Parent/Guardian was updated as to the progress/plan of care.    The patient remains in critical and unstable condition, and requires ICU care and monitoring. Total critical care time spent by attending physician was minutes, excluding procedure time.    The patient is improving but requires continued monitoring and adjustment of therapy. Interval/Overnight Events:   _________________________________________________________________  Respiratory:   room air  _________________________________________________________________  Cardiac:  Cardiac Rhythm: A sense, V paced (DDD )    furosemide   Oral Tab/Cap - Peds 40 milliGRAM(s) Oral every 12 hours    _________________________________________________________________  Hematologic:      ________________________________________________________________  Infectious:      RECENT CULTURES:      ________________________________________________________________  Fluids/Electrolytes/Nutrition:  I&O's Summary    07 Aug 2019 07:01  -  08 Aug 2019 07:00  --------------------------------------------------------  IN: 720 mL / OUT: 1075 mL / NET: -355 mL      Diet: regular diet    cholecalciferol Oral Tab/Cap - Peds 2000 Unit(s) Oral daily  docusate sodium Oral Tab/Cap - Peds 100 milliGRAM(s) Oral daily  senna 15 milliGRAM(s) Oral Chewable Tablet - Peds 1 Tablet(s) Chew daily    _________________________________________________________________  Neurologic:  Adequacy of sedation and pain control has been assessed and adjusted    acetaminophen   Oral Tab/Cap - Peds. 650 milliGRAM(s) Oral every 6 hours PRN  carBAMazepine  Oral Tab/Cap - Peds 200 milliGRAM(s) Oral daily  carBAMazepine  Oral Tab/Cap - Peds 400 milliGRAM(s) Oral daily  ibuprofen  Oral Tab/Cap - Peds. 400 milliGRAM(s) Oral every 6 hours PRN  trihexyphenidyl Oral Tab/Cap - Peds 5 milliGRAM(s) Oral <User Schedule>    ________________________________________________________________  Additional Meds:      ________________________________________________________________  Access:    Necessity of urinary, arterial, and venous catheters discussed  ________________________________________________________________  Labs:      _________________________________________________________________  Imaging:    < from: Xray Chest 1 View- PORTABLE-Routine (08.08.19 @ 07:22) >    EXAM:  XR CHEST PORTABLE ROUTINE 1V        PROCEDURE DATE:  Aug  8 2019         INTERPRETATION:  Indication: Removal of chest tube    Single AP view of the chest is compared to the prior study of 8/7/2019.   The mediastinal chest tube has been removed. No evidence of pneumothorax.   The remainder the examination is without significant change.    Impression: Status post mediastinal chest tube removal. No evidence of   pneumothorax.                  STEVE MEJIA M.D., ATTENDING RADIOLOGIST  This document has been electronically signed. Aug  8 2019  9:25AM                  < end of copied text >    _________________________________________________________________  PE:  T(C): 36.7 (08-08-19 @ 05:00), Max: 36.8 (08-07-19 @ 17:00)  HR: 80 (08-08-19 @ 05:00) (72 - 85)  BP: 129/57 (08-08-19 @ 02:00) (122/57 - 135/56)  ABP: --  ABP(mean): --  RR: 13 (08-08-19 @ 05:00) (13 - 20)  SpO2: 94% (08-08-19 @ 05:00) (94% - 95%)  CVP(mm Hg): --      General:	In no acute distress  Respiratory:	Lungs clear to auscultation bilaterally, good aeration b/l.  crackles at right lung base today, No rales, rhonchi, retractions or wheezing. Effort even and unlabored.  CV:		Regular rate and rhythm. Normal S1/S2. 2/6 RO at LLSB, no diastolic murmur,  rubs, or gallop. Capillary refill < 2 seconds. Distal pulses 2+ and equal.  Abdomen:	Soft, non-distended. Bowel sounds present. No palpable   .		hepatosplenomegaly.  Skin:		No rash. chest tube sites c/d/i  Extremities:	Warm and well perfused. upper and lower extremity contractures,   Neurologic:	alert, interactive, , No acute change from baseline exam.    ________________________________________________________________  Patient and Parent/Guardian was updated as to the progress/plan of care.      The patient is improving

## 2019-08-08 NOTE — DISCHARGE NOTE PROVIDER - CARE PROVIDER_API CALL
Deniz De Luna)  Cardiovascular Medicine  [address], [address]  [place] [address]  Phone: [phone]  Fax: [phone]  Follow Up Time: ePte De Luna (MD)  Adult Congenital Heart Disease; Pediatric Cardiology; Pediatrics  [address] [place] [address]  Phone: [phone]  Fax: [phone]  Follow Up Time:

## 2019-08-08 NOTE — DISCHARGE NOTE PROVIDER - NSDCCPCAREPLAN_GEN_ALL_CORE_FT
PRINCIPAL DISCHARGE DIAGNOSIS  Diagnosis: Status post cardiac surgery  Assessment and Plan of Treatment: Please take the Lasix 40 mg every 12 hours until you follow up with cardiology at which point they will reassess.   Continue taking the Coumadin 5 mg every night. He will get a blood test on monday to check his levels to determine whether there will be an increase or decrease.   Please follow up with Cardiology at your next scheduled appiontment.   Please follow up with Dr. De Luna on 8/16.

## 2019-08-08 NOTE — DISCHARGE NOTE PROVIDER - CARE PROVIDERS DIRECT ADDRESSES
,DirectAddress_Unknown ,[email or web address]

## 2019-08-08 NOTE — DISCHARGE NOTE PROVIDER - NSDCFUSCHEDAPPT_GEN_ALL_CORE_FT
ANABEL DAVIS ; 10/10/2019 ; [facility] [address]  ANABEL DAVIS ; 10/10/2019 ; [facility] [address] ANABEL DAVIS ; 10/10/2019 ; [facility] [address]  ANABEL DAVIS ; 10/10/2019 ; [facility] [address] ANABEL DAVIS ; 10/10/2019 ; [facility] [address]  ANABEL DAVIS ; 10/10/2019 ; [facility] [address] ANABEL DAVIS ; 10/10/2019 ; [facility] [address]  ANABEL DAVIS ; 10/10/2019 ; [facility] [address] ANABEL DAVIS ; 10/10/2019 ; [facility] [address]  ANABEL DAVIS ; 10/10/2019 ; [facility] [address] ANABEL DAVIS ; 10/10/2019 ; \[facility]\"" [facility] [address]  ANABEL DAVIS ; 10/10/2019 ; \[facility]\"" [facility] [address]

## 2019-08-08 NOTE — PROGRESS NOTE PEDS - SUBJECTIVE AND OBJECTIVE BOX
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 Tolerating diet, has not yet had a BM. Received senna. Mediastinal chest tube pulled.     RESPIRATORY SUPPORT: room air    NUTRITION: regular diet    INTAKE/OUTPUT:   @ 07:01  -   @ 07:00  --------------------------------------------------------  IN: 720 mL / OUT: 1075 mL / NET: -355 mL    INTRAVASCULAR ACCESS: 1 PIV    MEDICATIONS:  furosemide   Oral Tab/Cap - Peds 40 milliGRAM(s) Oral every 12 hours  carBAMazepine  Oral Tab/Cap - Peds 200 milliGRAM(s) Oral daily  carBAMazepine  Oral Tab/Cap - Peds 400 milliGRAM(s) Oral daily  trihexyphenidyl Oral Tab/Cap - Peds 5 milliGRAM(s) Oral <User Schedule>  docusate sodium Oral Tab/Cap - Peds 100 milliGRAM(s) Oral daily  senna 15 milliGRAM(s) Oral Chewable Tablet - Peds 1 Tablet(s) Chew once  cholecalciferol Oral Tab/Cap - Peds 2000 Unit(s) Oral daily    PHYSICAL EXAMINATION:  Vital signs - Weight (kg): 55.8 ( @ 06:42)  T(C): 36.9 (19 @ 08:00), Max: 36.9 (19 @ 08:00)  HR: 82 (19 @ 08:00) (72 - 85)  BP: 139/64 (19 @ 08:00) (122/63 - 139/64)  RR: 19 (19 @ 08:00) (13 - 20)  SpO2: 95% (19 @ 08:00) (94% - 95%)    General - well-developed, in no distress.  Skin - no rash, no desquamation, no cyanosis.  Pulmonary - normal inspiratory effort, no retractions, lungs clear to auscultation bilaterally, no wheezes, no rales.   Cardiovascular - normal rate, regular rhythm, normal S1 & S2, no murmurs, no rubs, no gallops, capillary refill < 2sec, normal pulses.  Gastrointestinal - soft, non-distended, non-tender, no hepatosplenomegaly  Musculoskeletal - no joint swelling, no clubbing, no edema  Neurologic / Psychiatric - alert, oriented as age-appropriate, affect appropriate, moves all extremities, normal tone.      LABORATORY TESTS:                          12.1  CBC:   15.16 )-----------( 137   (19 @ 01:20)                          34.8               143   |  101   |  16                 Ca: 8.9    BMP:   ----------------------------< 107    M.9   (19 @ 06:45)             3.3    |  30    | 0.80               Ph: 1.6      LFT:     TPro: 6.1 / Alb: 4.2 / TBili: 0.6 / DBili: x / AST: 21 / ALT: 18 / AlkPhos: 59   (19 @ 13:09)    COAG: PT: 12.7 / PTT: 27.8 / INR: 1.11   (19 @ 09:31)       ABG:   pH: 7.44 / pCO2: 34 / pO2: 81 / HCO3: 24 / Base Excess: -0.6 / SaO2: 97.2 / Lactate: 1.5 / iCa: 1.00   (19 @ 01:20)      VBG:   pH: 7.45 / pCO2: 38 / pO2: 59 / HCO3: 26 / Base Excess: 2.2 / SaO2: 91.2   (19 @ 09:47)    IMAGING STUDIES:  Electrocardiogram - 19  Atrial sensing, ventricular sensing with 100% capture    Telemetry - normal sinus rhythm, no ectopy, no arrhythmias (Atrial sensing, ventricular sensing with 100% capture)    Chest x-ray -   Xray Chest 1 View- PORTABLE-Routine (19 @ 12:33) >  IMPRESSION: The patient is status post pulmonic valve replacement. A   right cardiac device is noted as on the prior study. There is a   mediastinal drainage catheter. There is a left IJ line with the tip   within a left-sided superior vena cava.    The heart size appears enlarged. There is retrocardiac opacity likely due   to atelectasis. There may be a tiny right pneumothorax. Right-sided   pigtail pleural catheter is noted. Follow-up is recommended.    < from: Xray Chest 1 View- PORTABLE-Routine (19 @ 06:47) >  Impression: Postoperative changes as above. There is resolution of a right-sided   pneumothorax.    Interstitial prominence and shifting atelectasis are noted.      Echocardiogram:   Echocardiogram, Pediatric (19)   1. History of subaortic VSD and coarctation of aorta s/p PA band and coarctation repair by subclavian flap method. s/p PA band take down and VSD patch closure. Subaortic membrane s/p resection. Subsequently balloon dilation of recurrent coarctation. Severe pulmonary insufficiency.   2. 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   3. No evidence of neopulmonary stenosis.   4. No evidence of neopulmonary regurgitation.   5. No residual left ventricular outflow tract obstruction.   6. Mild aortic valve regurgitation.   7. Dyskinesia of the interventricular septum.   8. Dilated right ventricle.   9. Mild global hypokinesia of the right ventricle.  10. Mild global hypokinesia of the left ventricle.      19 CATH (Dr. Forde): Normal CO (3.6 L/min), RUPV draining to RSVC (just below azygous entry) w/ Qp:Qs 1.7-1.9. Free PI w/ normal RVEDp (7mmHg), dilated MPA and unobstructed branch PAs. Unobstructed aortic arch (PSEG 8mmHg) with mild hypoplasia and tortuosity of transverse arch. Patent LSVC to CS (smaller than RSVC and no bridging vein). No atrial communication.       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 Tolerating diet, has not yet had a BM. Received senna. Mediastinal chest tube pulled yesterday.     RESPIRATORY SUPPORT: room air    NUTRITION: regular diet    INTAKE/OUTPUT:   @ 07:01  -   @ 07:00  --------------------------------------------------------  IN: 720 mL / OUT: 1075 mL / NET: -355 mL    INTRAVASCULAR ACCESS: 1 PIV    MEDICATIONS:  furosemide   Oral Tab/Cap - Peds 40 milliGRAM(s) Oral every 12 hours  carBAMazepine  Oral Tab/Cap - Peds 200 milliGRAM(s) Oral daily  carBAMazepine  Oral Tab/Cap - Peds 400 milliGRAM(s) Oral daily  trihexyphenidyl Oral Tab/Cap - Peds 5 milliGRAM(s) Oral <User Schedule>  docusate sodium Oral Tab/Cap - Peds 100 milliGRAM(s) Oral daily  senna 15 milliGRAM(s) Oral Chewable Tablet - Peds 1 Tablet(s) Chew once  cholecalciferol Oral Tab/Cap - Peds 2000 Unit(s) Oral daily    PHYSICAL EXAMINATION:  Vital signs - Weight (kg): 55.8 ( @ 06:42)  T(C): 36.9 (19 @ 08:00), Max: 36.9 (19 @ 08:00)  HR: 82 (19 @ 08:00) (72 - 85)  BP: 139/64 (19 @ 08:00) (122/63 - 139/64)  RR: 19 (19 @ 08:00) (13 - 20)  SpO2: 95% (19 @ 08:00) (94% - 95%)    General - well-developed, in no distress.  Skin - no rash, no desquamation, no cyanosis.  Pulmonary - normal inspiratory effort, no retractions, lungs clear to auscultation bilaterally, no wheezes, no rales.   Cardiovascular - normal rate, regular rhythm, normal S1 & S2, no murmurs, no rubs, no gallops, capillary refill < 2sec, normal pulses.  Gastrointestinal - soft, non-distended, non-tender, no hepatosplenomegaly  Musculoskeletal - no joint swelling, no clubbing, no edema  Neurologic / Psychiatric - alert, oriented as age-appropriate, affect appropriate, moves all extremities, normal tone.      LABORATORY TESTS:                          12.1  CBC:   15.16 )-----------( 137   (19 @ 01:20)                          34.8               143   |  101   |  16                 Ca: 8.9    BMP:   ----------------------------< 107    M.9   (19 @ 06:45)             3.3    |  30    | 0.80               Ph: 1.6      LFT:     TPro: 6.1 / Alb: 4.2 / TBili: 0.6 / DBili: x / AST: 21 / ALT: 18 / AlkPhos: 59   (19 @ 13:09)    COAG: PT: 12.7 / PTT: 27.8 / INR: 1.11   (19 @ 09:31)       ABG:   pH: 7.44 / pCO2: 34 / pO2: 81 / HCO3: 24 / Base Excess: -0.6 / SaO2: 97.2 / Lactate: 1.5 / iCa: 1.00   (19 @ 01:20)      VBG:   pH: 7.45 / pCO2: 38 / pO2: 59 / HCO3: 26 / Base Excess: 2.2 / SaO2: 91.2   (19 @ 09:47)    IMAGING STUDIES:  Electrocardiogram - 19  Atrial sensing, ventricular sensing with 100% capture    Telemetry - normal sinus rhythm, no ectopy, no arrhythmias (Atrial sensing, ventricular sensing with 100% capture)    Chest x-ray -   < from: Xray Chest 1 View- PORTABLE-Routine (19 @ 07:22) >  Single AP view of the chest is compared to the prior study of 2019.   The mediastinal chest tube has been removed. No evidence of pneumothorax.   The remainder the examination is without significant change.    Impression: Status post mediastinal chest tube removal. No evidence of   pneumothorax.      Echocardiogram:   Echocardiogram, Pediatric (19)   1. History of subaortic VSD and coarctation of aorta s/p PA band and coarctation repair by subclavian flap method. s/p PA band take down and VSD patch closure. Subaortic membrane s/p resection. Subsequently balloon dilation of recurrent coarctation. Severe pulmonary insufficiency.   2. 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   3. No evidence of neopulmonary stenosis.   4. No evidence of neopulmonary regurgitation.   5. No residual left ventricular outflow tract obstruction.   6. Mild aortic valve regurgitation.   7. Dyskinesia of the interventricular septum.   8. Dilated right ventricle.   9. Mild global hypokinesia of the right ventricle.  10. Mild global hypokinesia of the left ventricle.      19 CATH (Dr. Forde): Normal CO (3.6 L/min), RUPV draining to RSVC (just below azygous entry) w/ Qp:Qs 1.7-1.9. Free PI w/ normal RVEDp (7mmHg), dilated MPA and unobstructed branch PAs. Unobstructed aortic arch (PSEG 8mmHg) with mild hypoplasia and tortuosity of transverse arch. Patent LSVC to CS (smaller than RSVC and no bridging vein). No atrial communication.

## 2019-08-08 NOTE — DISCHARGE NOTE PROVIDER - HOSPITAL COURSE
33yo M with PMH significant for CP, dystonia, h/o CVA, seizure d/o, intellectual disability and developmental delays. He is s/p surgical repair of subaortic VSD and aortic coarctation using a subclavian flap technique with PA banding, subsequent takedown of PA banding and patch closure of subaortic VSD. Pt also underwent resection of DANIELLE. Balloon angioplasty was necessary for recurrence of coarctation. In addition he required dual chamber pacemaker placement for high grade AV block. He is 100% ventricular paced with atrial sensing. His most recent pacemaker interrogation was on 7/10/19 (report attached) and his last pacemaker generator change was in 2013.         He is s/p pulmonary valve replacement with Dr. Crowley. A 27 mm bovine pericardial valve was placed in the pulmonary position, total bypass time was 40 minutes. He did not require any transfusions.        PICU Course:        Cardiac:     Resp: remained stable on room air. Did not require any oxygen throughout the duration of his stay. 33yo M with PMH significant for CP, dystonia, h/o CVA, seizure d/o, intellectual disability and developmental delays. He is s/p surgical repair of subaortic VSD and aortic coarctation using a subclavian flap technique with PA banding, subsequent takedown of PA banding and patch closure of subaortic VSD. Pt also underwent resection of DANIELLE. Balloon angioplasty was necessary for recurrence of coarctation. In addition he required dual chamber pacemaker placement for high grade AV block. He is 100% ventricular paced with atrial sensing. His most recent pacemaker interrogation was on 7/10/19 (report attached) and his last pacemaker generator change was in 2013.         He is s/p pulmonary valve replacement with Dr. Crowley. A 27 mm bovine pericardial valve was placed in the pulmonary position, total bypass time was 40 minutes. He did not require any transfusions.        PICU Course:        Cardiac: Returned with a chest tube which was removed on POD 3. He remained hemodynamically stable. CBC was     Resp: remained stable on room air. Did not require any oxygen throughout the duration of his stay. 33yo M with PMH significant for CP, dystonia, h/o CVA, seizure d/o, intellectual disability and developmental delays. He is s/p surgical repair of subaortic VSD and aortic coarctation using a subclavian flap technique with PA banding, subsequent takedown of PA banding and patch closure of subaortic VSD. Pt also underwent resection of DANIELLE. Balloon angioplasty was necessary for recurrence of coarctation. In addition he required dual chamber pacemaker placement for high grade AV block. He is 100% ventricular paced with atrial sensing. His most recent pacemaker interrogation was on 7/10/19 (report attached) and his last pacemaker generator change was in 2013.         He is s/p pulmonary valve replacement with Dr. Crowley. A 27 mm bovine pericardial valve was placed in the pulmonary position, total bypass time was 40 minutes. He did not require any transfusions.        PICU Course:        Cardiac: Returned with a chest tube which was removed on POD 3. He remained hemodynamically stable. CBC was stable throughout the duration and did not require any blood cells. An echo was done which showed    Resp: remained stable on room air. Did not require any oxygen throughout the duration of his stay.     Heme: Due to the presence of a valve, Coumadin was given on POD 4 and INR was trended. 35yo M with PMH significant for CP, dystonia, h/o CVA, seizure d/o, intellectual disability and developmental delays. He is s/p surgical repair of subaortic VSD and aortic coarctation using a subclavian flap technique with PA banding, subsequent takedown of PA banding and patch closure of subaortic VSD. Pt also underwent resection of DANIELLE. Balloon angioplasty was necessary for recurrence of coarctation. In addition he required dual chamber pacemaker placement for high grade AV block. He is 100% ventricular paced with atrial sensing. His most recent pacemaker interrogation was on 7/10/19 (report attached) and his last pacemaker generator change was in 2013.         He is s/p pulmonary valve replacement with Dr. Crowley. A 27 mm bovine pericardial valve was placed in the pulmonary position, total bypass time was 40 minutes. He did not require any transfusions.        PICU Course:        Cardiac: Returned with a chest tube which was removed on POD 3. He remained hemodynamically stable. CBC was stable throughout the duration and did not require any blood cells. An echo was done which showed    Resp: remained stable on room air. Did not require any oxygen throughout the duration of his stay.     Heme: Due to the presence of a valve, Coumadin was given on POD 4 and INR was trended.     GI: had some difficulties stooling and was started on a bowel regimen with improvement in constipation. 33yo M with PMH significant for CP, dystonia, h/o CVA, seizure d/o, intellectual disability and developmental delays. He is s/p surgical repair of subaortic VSD and aortic coarctation using a subclavian flap technique with PA banding, subsequent takedown of PA banding and patch closure of subaortic VSD. Pt also underwent resection of DANIELLE. Balloon angioplasty was necessary for recurrence of coarctation. In addition he required dual chamber pacemaker placement for high grade AV block. He is 100% ventricular paced with atrial sensing. His most recent pacemaker interrogation was on 7/10/19 (report attached) and his last pacemaker generator change was in 2013.         He is s/p pulmonary valve replacement with Dr. Crowley. A 27 mm bovine pericardial valve was placed in the pulmonary position, total bypass time was 40 minutes. He did not require any transfusions.        PICU Course:        Cardiac: Returned with a chest tube which was removed on POD 3. He remained hemodynamically stable. CBC was stable throughout the duration and did not require any blood cells. An echo was done.     Neuro: he was started on tylenol and toradol around the clock and was given morphine prn for intermittent pain.     Resp: remained stable on room air. Did not require any oxygen throughout the duration of his stay.     Heme: Due to the presence of a valve, Coumadin was given on POD 4 and INR was trended.     GI: had some difficulties stooling and was started on a bowel regimen with improvement in constipation.         Discharge physical:     General: well appearing    Cardiac: S1, S2. systolic ejection murmur appreciated.     Pulm: CTAB     Abd: soft, non tender, non distended

## 2019-08-09 ENCOUNTER — TRANSCRIPTION ENCOUNTER (OUTPATIENT)
Age: 34
End: 2019-08-09

## 2019-08-09 VITALS
RESPIRATION RATE: 20 BRPM | HEART RATE: 78 BPM | DIASTOLIC BLOOD PRESSURE: 54 MMHG | SYSTOLIC BLOOD PRESSURE: 129 MMHG | TEMPERATURE: 97 F | OXYGEN SATURATION: 96 %

## 2019-08-09 LAB
INR BLD: 1.36 — HIGH (ref 0.88–1.17)
PROTHROM AB SERPL-ACNC: 15.7 SEC — HIGH (ref 9.8–13.1)

## 2019-08-09 PROCEDURE — 99238 HOSP IP/OBS DSCHRG MGMT 30/<: CPT

## 2019-08-09 RX ADMIN — Medication 200 MILLIGRAM(S): at 07:34

## 2019-08-09 RX ADMIN — SENNA PLUS 2 TABLET(S): 8.6 TABLET ORAL at 10:37

## 2019-08-09 RX ADMIN — Medication 2000 UNIT(S): at 10:37

## 2019-08-09 RX ADMIN — Medication 40 MILLIGRAM(S): at 10:37

## 2019-08-09 RX ADMIN — Medication 5 MILLIGRAM(S): at 07:34

## 2019-08-09 RX ADMIN — Medication 100 MILLIGRAM(S): at 10:37

## 2019-08-09 RX ADMIN — POLYETHYLENE GLYCOL 3350 17 GRAM(S): 17 POWDER, FOR SOLUTION ORAL at 10:37

## 2019-08-09 NOTE — PROGRESS NOTE PEDS - SUBJECTIVE AND OBJECTIVE BOX
PEDIATRIC CARDIOLOGY DISCHARGE NOTE    CARDIOLOGIST: Dr. Blanton  SURGEON: Dr. 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     Prior Cardiac/Interventional History:  Denis underwent coarctation repair using a subclavian flap technique with pulmonary artery banding in infancy by Dr. Alanis at [facility]. He subsequently underwent takedown of the pulmonary artery band and patch closure of the subaortic ventricular septal defect. Denis later required resection of a subaortic membrane. In , he underwent balloon angioplasty of her recurrent coarctation in the catheterization laboratory. In 2005, Denis underwent transvenous dual-chamber pacemaker placement for high-grade atrioventricular block. In 2013, Denis underwent pacemaker generator change with Dr. Bassam Garcia at [facility].      OR (Dr. Alanis): Coarctation repair and pulmonary artery banding (s/p arrest on DOL 1)   OR (Dr. Alanis): PA band takedown, patch closure of subaortic VSD   OR (Dr. Alanis): Resection of subaortic membrane  1997 CATH (Dr. Huerta): Balloon angioplasty for recurrent coarctation  2005 OR (Dr. Garcia): Left sided dual chamber transvenous pacemaker placement for high grade AVB   2005: Pacemaker pocket infection- 6-wk abx for MSSA bacteremia & presumed endocarditis (negative TTE)  3/7/2005 OR (Dr. Garcia): ): New right sided dual chamber transvenous pacemaker placement   19 CATH (Dr. Forde): Normal CO (3.6 L/min), RUPV draining to RSVC (just below azygous entry) w/ Qp:Qs 1.7-1.9. Free PI w/ normal RVEDp (7mmHg), dilated MPA and unobstructed branch PAs. Unobstructed aortic arch (PSEG 8mmHg) with mild hypoplasia and tortuosity of transverse arch. Patent LSVC to CS (smaller than RSVC and no bridging vein). No atrial communication.     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  He has complete heart block and has a right sided transvenous, dual chamber pacing system inserted through the right subclavian vein and is 100% ventricular paced with atrial sensing. He has been noted for some time to have severe pulmonary regurgitation and an enlarging right ventricle. He has been asymptomatic according to his parents. His activity level is unchanged and they do not notice any change in his level of activity. He walks up and down the stairs in the house without any shortness of breath. He has no complaints of chest pain, palpitations, dizziness, exercise intolerance or syncope.   He underwent cardiac catheterization which showed normal pulmonary artery pressure and severe pulmonary regurgitation (probably due to pulmonary debanding).   He is currently admitted after pulmonary valve replacement (27 mm bovine valve) with total bypass time 40 minutes. He was extubated in the OR and did not need vasoactive medications.     HOSPITAL COURSE:   CV.: Patient Returned to the PICU with chest tube and on NC. He was started on diuretics with Lasix 6-8 hours after surgery. Continued to be paced with right sided transvenous, dual chamber pacing system- DDD, Lower rate 50 and upper rate of 150 bmp. Chest tube was removed on the POD 3 as he did not have significant drainage. ECHO showed well functioning mele-pulmonary valve with mild PI and no LVOT obstruction with mild AI. He was started on coumadin on 19 and INR on the day of discharge was 1.36.   Resp: Extubated in the OR and came back to the PICU on NC, and weaned to RA on POD #1. Right chest tube removed on  and the Left chest tube was removed on 2019. Patient has been stable on room air   ID: Patient received Perioperative Ancef for 48 hours. No fever or infection concerns subsequently.   Renal/Metabolic : Stable   Heme: Coumadin was started on  and daily INR were obtained until discharge. Patient will be closely followed by outpatient for INR monitoring.     Gastrointestinal / Nutrition: Regular diet was started on POD #1 and he tolerated it well.   -  -  -  -  -  -  -  -  -  -  -  -  -  -  -  -  -  -  -  -  -  -  -  -  -  -  -  -  -  -  -  -  -  -  -  -    PHYSICAL EXAMINATION:  Vital signs - Weight (kg): 55.8 ( @ 06:42)  T(C): 36.3 (19 @ 11:00), Max: 36.8 (19 @ 14:00)  HR: 78 (19 @ 11:00) (66 - 92)  BP: 129/54 (19 @ 11:00) (117/72 - 136/63)  RR: 20 (19 @ 11:00) (14 - 21)  SpO2: 96% (19 @ 11:00) (93% - 96%)    General - well-developed, in no distress.  Skin - no rash, no desquamation, no cyanosis.  Pulmonary - normal inspiratory effort, no retractions, lungs clear to auscultation bilaterally, no wheezes, no rales.   Cardiovascular - normal rate, regular rhythm, normal S1 & S2, no murmurs, no rubs, no gallops, capillary refill < 2sec, normal pulses.  Gastrointestinal - soft, non-distended, non-tender, no hepatosplenomegaly  Musculoskeletal - no joint swelling, no clubbing, no edema  Neurologic / Psychiatric - alert, oriented as age-appropriate, affect appropriate, moves all extremities, normal tone.    LABORATORY TESTS:                          12.1  CBC:   15.16 )-----------( 137   (19 @ 01:20)                          34.8               143   |  101   |  16                 Ca: 8.9    BMP:   ----------------------------< 107    M.9   (19 @ 06:45)             3.3    |  30    | 0.80               Ph: 1.6      LFT:     TPro: 6.1 / Alb: 4.2 / TBili: 0.6 / DBili: x / AST: 21 / ALT: 18 / AlkPhos: 59   (19 @ 13:09)    COAG: PT: 15.7 / PTT: x / INR: 1.36   (19 @ 08:37)     ABG:   pH: 7.44 / pCO2: 34 / pO2: 81 / HCO3: 24 / Base Excess: -0.6 / SaO2: 97.2 / Lactate: 1.5 / iCa: 1.00   (19 @ 01:20)    VBG:   pH: 7.45 / pCO2: 38 / pO2: 59 / HCO3: 26 / Base Excess: 2.2 / SaO2: 91.2   (19 @ 09:47)    PT/INR - ( 09 Aug 2019 08:37 )   PT: 15.7 SEC;   INR: 1.36       IMAGING STUDIES:  Electrocardiogram - 19  Atrial sensing, ventricular sensing with 100% capture    Telemetry - normal sinus rhythm, no ectopy, no arrhythmias (Atrial sensing, ventricular sensing with 100% capture)    Echocardiogram:   Echocardiogram, Pediatric (19)   1. History of subaortic VSD and coarctation of aorta s/p PA band and coarctation repair by subclavian flap method. s/p PA band take down and VSD patch closure. Subaortic membrane s/p resection. Subsequently balloon dilation of recurrent coarctation. Severe pulmonary insufficiency.   2. 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   3. No evidence of neopulmonary stenosis.   4. No evidence of neopulmonary regurgitation.   5. No residual left ventricular outflow tract obstruction.   6. Mild aortic valve regurgitation.   7. Dyskinesia of the interventricular septum.   8. Dilated right ventricle.   9. Mild global hypokinesia of the right ventricle.  10. Mild global hypokinesia of the left ventricle.    DISCHARGE PLAN: The patient was discharged home, with therapies and follow-up appointments as outlined below. Detailed discharge instructions were provided to the family.    CURRENT MEDICATIONS:   Lasix 40 mg BID  Coumadin 5mg Daily   Carbamazepine 200mg in AM and 400 mg PM  Trihexaphenidyl 5mg   Tylenol 650 mg PO PRN    CURRENT FEEDING/NUTRITION: Regular diet    PROPHYLAXIS & OTHER INSTRUCTIONS:   - SBE prophylaxis is required for invasive ENT and dental procedures.     FOLLOW-UP APPOINTMENTS:   Repeat INR on 19  Follow up appointment - 19 at 11 :30am with  Dr. De Luna (cardiologist) & Keo (Cardiac surgeon). PEDIATRIC CARDIOLOGY DISCHARGE NOTE    CARDIOLOGIST: Dr. Blanton  SURGEON: Dr. 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     Prior Cardiac/Interventional History:  Denis underwent coarctation repair using a subclavian flap technique with pulmonary artery banding in infancy by Dr. Alanis at [facility]. He subsequently underwent takedown of the pulmonary artery band and patch closure of the subaortic ventricular septal defect. Denis later required resection of a subaortic membrane. In , he underwent balloon angioplasty of her recurrent coarctation in the catheterization laboratory. In 2005, Denis underwent transvenous dual-chamber pacemaker placement for high-grade atrioventricular block. In 2013, Denis underwent pacemaker generator change with Dr. Bassam Garcia at [facility].      OR (Dr. Alanis): Coarctation repair and pulmonary artery banding (s/p arrest on DOL 1)   OR (Dr. Alanis): PA band takedown, patch closure of subaortic VSD   OR (Dr. Alanis): Resection of subaortic membrane  1997 CATH (Dr. Huerta): Balloon angioplasty for recurrent coarctation  2005 OR (Dr. Garcia): Left sided dual chamber transvenous pacemaker placement for high grade AVB   2005: Pacemaker pocket infection- 6-wk abx for MSSA bacteremia & presumed endocarditis (negative TTE)  3/7/2005 OR (Dr. Garcia): ): New right sided dual chamber transvenous pacemaker placement   19 CATH (Dr. Forde): Normal CO (3.6 L/min), RUPV draining to RSVC (just below azygous entry) w/ Qp:Qs 1.7-1.9. Free PI w/ normal RVEDp (7mmHg), dilated MPA and unobstructed branch PAs. Unobstructed aortic arch (PSEG 8mmHg) with mild hypoplasia and tortuosity of transverse arch. Patent LSVC to CS (smaller than RSVC and no bridging vein). No atrial communication.     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  He has complete heart block and has a right sided transvenous, dual chamber pacing system inserted through the right subclavian vein and is 100% ventricular paced with atrial sensing. He has been noted for some time to have severe pulmonary regurgitation and an enlarging right ventricle. He has been asymptomatic according to his parents. His activity level is unchanged and they do not notice any change in his level of activity. He walks up and down the stairs in the house without any shortness of breath. He has no complaints of chest pain, palpitations, dizziness, exercise intolerance or syncope.   He underwent cardiac catheterization which showed normal pulmonary artery pressure and severe pulmonary regurgitation (probably due to pulmonary debanding).   He is currently admitted after pulmonary valve replacement (27 mm bovine valve) with total bypass time 40 minutes. He was extubated in the OR and did not need vasoactive medications.     HOSPITAL COURSE:   CV.: Patient Returned to the PICU with chest tube and on NC. He was started on diuretics with Lasix 6-8 hours after surgery. Continued to be paced with right sided transvenous, dual chamber pacing system- DDD, Lower rate 50 and upper rate of 150 bmp. Chest tube was removed on the POD 3 as he did not have significant drainage. ECHO showed well functioning mele-pulmonary valve with mild PI and no LVOT obstruction with mild AI. He was started on coumadin on 19 and INR on the day of discharge was 1.36.   Resp: Extubated in the OR and came back to the PICU on NC, and weaned to RA on POD #1. Right chest tube removed on  and the Left chest tube was removed on 2019. Patient has been stable on room air   ID: Patient received Perioperative Ancef for 48 hours. No fever or infection concerns subsequently.   Renal/Metabolic : Stable   Heme: Coumadin was started on  and daily INR were obtained until discharge. Patient will be closely followed by outpatient for INR monitoring.     Gastrointestinal / Nutrition: Regular diet was started on POD #1 and he tolerated it well.   -  -  -  -  -  -  -  -  -  -  -  -  -  -  -  -  -  -  -  -  -  -  -  -  -  -  -  -  -  -  -  -  -  -  -  -    PHYSICAL EXAMINATION:  Vital signs - Weight (kg): 55.8 ( @ 06:42)  T(C): 36.3 (19 @ 11:00), Max: 36.8 (19 @ 14:00)  HR: 78 (19 @ 11:00) (66 - 92)  BP: 129/54 (19 @ 11:00) (117/72 - 136/63)  RR: 20 (19 @ 11:00) (14 - 21)  SpO2: 96% (19 @ 11:00) (93% - 96%)    General - well-developed, in no distress.  Skin - no rash, no desquamation, no cyanosis.  Pulmonary - normal inspiratory effort, no retractions, lungs clear to auscultation bilaterally, no wheezes, no rales.   Cardiovascular - normal rate, regular rhythm, normal S1 & S2, no murmurs, no rubs, no gallops, capillary refill < 2sec, normal pulses.  Gastrointestinal - soft, non-distended, non-tender, no hepatosplenomegaly  Musculoskeletal - no joint swelling, no clubbing, no edema  Neurologic / Psychiatric - alert, oriented as age-appropriate, affect appropriate, moves all extremities, normal tone.    LABORATORY TESTS:                          12.1  CBC:   15.16 )-----------( 137   (19 @ 01:20)                          34.8               143   |  101   |  16                 Ca: 8.9    BMP:   ----------------------------< 107    M.9   (19 @ 06:45)             3.3    |  30    | 0.80               Ph: 1.6      LFT:     TPro: 6.1 / Alb: 4.2 / TBili: 0.6 / DBili: x / AST: 21 / ALT: 18 / AlkPhos: 59   (19 @ 13:09)    COAG: PT: 15.7 / PTT: x / INR: 1.36   (19 @ 08:37)     ABG:   pH: 7.44 / pCO2: 34 / pO2: 81 / HCO3: 24 / Base Excess: -0.6 / SaO2: 97.2 / Lactate: 1.5 / iCa: 1.00   (19 @ 01:20)    VBG:   pH: 7.45 / pCO2: 38 / pO2: 59 / HCO3: 26 / Base Excess: 2.2 / SaO2: 91.2   (19 @ 09:47)    PT/INR - ( 09 Aug 2019 08:37 )   PT: 15.7 SEC;   INR: 1.36       IMAGING STUDIES:  Electrocardiogram - 19  Atrial sensing, ventricular sensing with 100% capture    Telemetry - normal sinus rhythm, no ectopy, no arrhythmias (Atrial sensing, ventricular sensing with 100% capture)    Echocardiogram:   Echocardiogram, Pediatric (19)   1. History of subaortic VSD and coarctation of aorta s/p PA band and coarctation repair by subclavian flap method. s/p PA band take down and VSD patch closure. Subaortic membrane s/p resection. Subsequently balloon dilation of recurrent coarctation. Severe pulmonary insufficiency.   2. 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   3. No evidence of neopulmonary stenosis.   4. No evidence of neopulmonary regurgitation.   5. No residual left ventricular outflow tract obstruction.   6. Mild aortic valve regurgitation.   7. Dyskinesia of the interventricular septum.   8. Dilated right ventricle.   9. Mild global hypokinesia of the right ventricle.  10. Mild global hypokinesia of the left ventricle.    DISCHARGE PLAN: The patient was discharged home, with therapies and follow-up appointments as outlined below. Detailed discharge instructions were provided to the family.    CURRENT MEDICATIONS:   Lasix 40 mg BID  Coumadin 5mg Daily   Carbamazepine 200mg in AM and 400 mg PM  Trihexaphenidyl 5mg   Tylenol 650 mg PO PRN    CURRENT FEEDING/NUTRITION: Regular diet    PROPHYLAXIS & OTHER INSTRUCTIONS:   - SBE prophylaxis is required for invasive ENT and dental procedures.     FOLLOW-UP APPOINTMENTS:   Repeat INR on 19  Follow up appointment - 19 at 11 :30am with  Dr. De Luna (cardiologist) & Keo (Cardiac surgeon).  Will adjust coumadin based on INR done at that time.

## 2019-08-09 NOTE — PROGRESS NOTE PEDS - PROBLEM SELECTOR PROBLEM 1
Aftercare following surgery of the circulatory system
Congenital pulmonary valve stenosis
Congenital pulmonary valve stenosis

## 2019-08-09 NOTE — DISCHARGE NOTE NURSING/CASE MANAGEMENT/SOCIAL WORK - [email or web address]_GEN_ALL_CORE
You can access the [facility] Patient Portal, offered by [facility], by registering with the following website: [email or web address]

## 2019-08-09 NOTE — PROGRESS NOTE PEDS - PROBLEM SELECTOR PROBLEM 9
Cerebral palsy, unspecified type

## 2019-08-09 NOTE — PROGRESS NOTE PEDS - PROBLEM SELECTOR PROBLEM 3
Pulmonary valve insufficiency, unspecified etiology
Pulmonary valve insufficiency, unspecified etiology
Aftercare following surgery of the circulatory system
Aftercare following surgery of the circulatory system
Pulmonary valve insufficiency, unspecified etiology

## 2019-08-09 NOTE — PROGRESS NOTE PEDS - PROBLEM SELECTOR PROBLEM 6
VSD (ventricular septal defect) and coarctation of aorta
VSD (ventricular septal defect) and coarctation of aorta
Intellectual disability
Intellectual disability
VSD (ventricular septal defect) and coarctation of aorta

## 2019-08-09 NOTE — PROGRESS NOTE PEDS - PROBLEM SELECTOR PROBLEM 4
Third degree AV block
Third degree AV block
Pulmonary valve insufficiency, unspecified etiology
Pulmonary valve insufficiency, unspecified etiology
Third degree AV block

## 2019-08-09 NOTE — PROGRESS NOTE PEDS - PROBLEM SELECTOR PROBLEM 8
Intellectual disability
Third degree AV block
Third degree AV block

## 2019-08-09 NOTE — PROGRESS NOTE PEDS - PROBLEM SELECTOR PROBLEM 7
Seizure disorder
VSD (ventricular septal defect) and coarctation of aorta
VSD (ventricular septal defect) and coarctation of aorta

## 2019-08-09 NOTE — PROGRESS NOTE PEDS - PROVIDER SPECIALTY LIST PEDS
Anesthesia
Cardiology
Critical Care

## 2019-08-09 NOTE — PROGRESS NOTE PEDS - REASON FOR ADMISSION
Cardiac Post op

## 2019-08-09 NOTE — PROGRESS NOTE PEDS - SUBJECTIVE AND OBJECTIVE BOX
Interval/Overnight Events:  _________________________________________________________________  Respiratory:      _________________________________________________________________  Cardiac:  Cardiac Rhythm: Sinus rhythm    furosemide   Oral Tab/Cap - Peds 40 milliGRAM(s) Oral every 12 hours    _________________________________________________________________  Hematologic:      ________________________________________________________________  Infectious:      RECENT CULTURES:      ________________________________________________________________  Fluids/Electrolytes/Nutrition:  I&O's Summary    08 Aug 2019 07:01  -  09 Aug 2019 07:00  --------------------------------------------------------  IN: 1610 mL / OUT: 750 mL / NET: 860 mL      Diet:    cholecalciferol Oral Tab/Cap - Peds 2000 Unit(s) Oral daily  docusate sodium Oral Tab/Cap - Peds 100 milliGRAM(s) Oral daily  polyethylene glycol 3350 Oral Powder - Peds 17 Gram(s) Oral daily  senna 15 milliGRAM(s) Oral Chewable Tablet - Peds 2 Tablet(s) Chew daily    _________________________________________________________________  Neurologic:  Adequacy of sedation and pain control has been assessed and adjusted    acetaminophen   Oral Tab/Cap - Peds. 650 milliGRAM(s) Oral every 6 hours PRN  carBAMazepine  Oral Tab/Cap - Peds 200 milliGRAM(s) Oral daily  carBAMazepine  Oral Tab/Cap - Peds 400 milliGRAM(s) Oral daily  ibuprofen  Oral Tab/Cap - Peds. 400 milliGRAM(s) Oral every 6 hours PRN  trihexyphenidyl Oral Tab/Cap - Peds 5 milliGRAM(s) Oral <User Schedule>    ________________________________________________________________  Additional Meds:      ________________________________________________________________  Access:    Necessity of urinary, arterial, and venous catheters discussed  ________________________________________________________________  Labs:    ( 08-08 @ 09:31 )   PT: 12.7 SEC;   INR: 1.11   aPTT: 27.8 SEC    _________________________________________________________________  Imaging:    _________________________________________________________________  PE:  T(C): 36.5 (08-09-19 @ 05:00), Max: 37.4 (08-08-19 @ 11:00)  HR: 68 (08-09-19 @ 05:00) (66 - 92)  BP: 120/56 (08-09-19 @ 05:00) (117/72 - 139/64)  ABP: --  ABP(mean): --  RR: 14 (08-09-19 @ 05:00) (14 - 21)  SpO2: 94% (08-09-19 @ 05:00) (93% - 96%)  CVP(mm Hg): --      General:	In no acute distress  Respiratory:	Lungs clear to auscultation bilaterally, good aeration b/l.  crackles at right lung base today, No rales, rhonchi, retractions or wheezing. Effort even and unlabored.  CV:		Regular rate and rhythm. Normal S1/S2. 2/6 RO at LLSB, no diastolic murmur,  rubs, or gallop. Capillary refill < 2 seconds. Distal pulses 2+ and equal.  Abdomen:	Soft, non-distended. Bowel sounds present. No palpable   .		hepatosplenomegaly.  Skin:		No rash. chest tube sites c/d/i  Extremities:	Warm and well perfused. upper and lower extremity contractures,   Neurologic:	alert, interactive, , No acute change from baseline exam.    ________________________________________________________________  Patient and Parent/Guardian was updated as to the progress/plan of care.    The patient remains in critical and unstable condition, and requires ICU care and monitoring. Total critical care time spent by attending physician was minutes, excluding procedure time.    The patient is improving but requires continued monitoring and adjustment of therapy. Interval/Overnight Events: BM overnight  _________________________________________________________________  Respiratory:  room air    _________________________________________________________________  Cardiac:  Cardiac Rhythm: Sinus rhythm    furosemide   Oral Tab/Cap - Peds 40 milliGRAM(s) Oral every 12 hours    _________________________________________________________________  Hematologic:      ________________________________________________________________  Infectious:      RECENT CULTURES:      ________________________________________________________________  Fluids/Electrolytes/Nutrition:  I&O's Summary    08 Aug 2019 07:01  -  09 Aug 2019 07:00  --------------------------------------------------------  IN: 1610 mL / OUT: 750 mL / NET: 860 mL      Diet: reg diet    cholecalciferol Oral Tab/Cap - Peds 2000 Unit(s) Oral daily  docusate sodium Oral Tab/Cap - Peds 100 milliGRAM(s) Oral daily  polyethylene glycol 3350 Oral Powder - Peds 17 Gram(s) Oral daily  senna 15 milliGRAM(s) Oral Chewable Tablet - Peds 2 Tablet(s) Chew daily    _________________________________________________________________  Neurologic:  Adequacy of sedation and pain control has been assessed and adjusted    acetaminophen   Oral Tab/Cap - Peds. 650 milliGRAM(s) Oral every 6 hours PRN  carBAMazepine  Oral Tab/Cap - Peds 200 milliGRAM(s) Oral daily  carBAMazepine  Oral Tab/Cap - Peds 400 milliGRAM(s) Oral daily  ibuprofen  Oral Tab/Cap - Peds. 400 milliGRAM(s) Oral every 6 hours PRN  trihexyphenidyl Oral Tab/Cap - Peds 5 milliGRAM(s) Oral <User Schedule>    ________________________________________________________________  Additional Meds:      ________________________________________________________________  Access:    Necessity of urinary, arterial, and venous catheters discussed  ________________________________________________________________  Labs:    ( 08-08 @ 09:31 )   PT: 12.7 SEC;   INR: 1.11   aPTT: 27.8 SEC    _________________________________________________________________  Imaging:    _________________________________________________________________  PE:  T(C): 36.5 (08-09-19 @ 05:00), Max: 37.4 (08-08-19 @ 11:00)  HR: 68 (08-09-19 @ 05:00) (66 - 92)  BP: 120/56 (08-09-19 @ 05:00) (117/72 - 139/64)  ABP: --  ABP(mean): --  RR: 14 (08-09-19 @ 05:00) (14 - 21)  SpO2: 94% (08-09-19 @ 05:00) (93% - 96%)  CVP(mm Hg): --      General:	In no acute distress  Respiratory:	Lungs clear to auscultation bilaterally, good aeration b/l.  crackles at right lung base today, No rales, rhonchi, retractions or wheezing. Effort even and unlabored.  CV:		Regular rate and rhythm. Normal S1/S2. 2/6 RO at LLSB, no diastolic murmur,  rubs, or gallop. Capillary refill < 2 seconds. Distal pulses 2+ and equal.  Abdomen:	Soft, non-distended. Bowel sounds present. No palpable   .		hepatosplenomegaly.  Skin:		No rash. chest tube sites c/d/i  Extremities:	Warm and well perfused. upper and lower extremity contractures,   Neurologic:	alert, interactive, , No acute change from baseline exam.    ________________________________________________________________  Patient and Parent/Guardian was updated as to the progress/plan of care.

## 2019-08-09 NOTE — PROGRESS NOTE PEDS - PROBLEM SELECTOR PROBLEM 2
Congenital pulmonary valve stenosis
Seizure disorder
Seizure disorder

## 2019-08-13 ENCOUNTER — OTHER (OUTPATIENT)
Age: 34
End: 2019-08-13

## 2019-08-14 LAB
INR PPP: 3.33 RATIO
INR PPP: 5
PT BLD: 39.4 SEC
PT BLD: 50.3
SURGICAL PATHOLOGY STUDY: SIGNIFICANT CHANGE UP

## 2019-08-15 ENCOUNTER — OUTPATIENT (OUTPATIENT)
Dept: OUTPATIENT SERVICES | Age: 34
LOS: 1 days | Discharge: HOME | End: 2019-08-15

## 2019-08-15 DIAGNOSIS — Z98.890 OTHER SPECIFIED POSTPROCEDURAL STATES: Chronic | ICD-10-CM

## 2019-08-15 DIAGNOSIS — Z92.89 PERSONAL HISTORY OF OTHER MEDICAL TREATMENT: Chronic | ICD-10-CM

## 2019-08-16 ENCOUNTER — APPOINTMENT (OUTPATIENT)
Dept: PEDIATRIC CARDIOLOGY | Facility: CLINIC | Age: 34
End: 2019-08-16
Payer: MEDICARE

## 2019-08-16 ENCOUNTER — APPOINTMENT (OUTPATIENT)
Dept: CARDIOTHORACIC SURGERY | Facility: CLINIC | Age: 34
End: 2019-08-16
Payer: MEDICARE

## 2019-08-16 VITALS
DIASTOLIC BLOOD PRESSURE: 71 MMHG | HEIGHT: 62.6 IN | OXYGEN SATURATION: 97 % | RESPIRATION RATE: 14 BRPM | SYSTOLIC BLOOD PRESSURE: 138 MMHG | WEIGHT: 120.15 LBS | HEART RATE: 87 BPM | BODY MASS INDEX: 21.56 KG/M2

## 2019-08-16 VITALS
RESPIRATION RATE: 14 BRPM | HEART RATE: 87 BPM | SYSTOLIC BLOOD PRESSURE: 138 MMHG | HEIGHT: 62.6 IN | WEIGHT: 120.15 LBS | BODY MASS INDEX: 21.56 KG/M2 | DIASTOLIC BLOOD PRESSURE: 71 MMHG | OXYGEN SATURATION: 97 %

## 2019-08-16 DIAGNOSIS — J98.4 OTHER DISORDERS OF LUNG: ICD-10-CM

## 2019-08-16 PROCEDURE — 93325 DOPPLER ECHO COLOR FLOW MAPG: CPT

## 2019-08-16 PROCEDURE — 93303 ECHO TRANSTHORACIC: CPT

## 2019-08-16 PROCEDURE — 93320 DOPPLER ECHO COMPLETE: CPT

## 2019-08-16 PROCEDURE — 99024 POSTOP FOLLOW-UP VISIT: CPT

## 2019-08-16 PROCEDURE — 93000 ELECTROCARDIOGRAM COMPLETE: CPT

## 2019-08-16 PROCEDURE — 99214 OFFICE O/P EST MOD 30 MIN: CPT | Mod: 25

## 2019-08-16 NOTE — PHYSICAL EXAM
[Clean] : clean [Dry] : dry [Healing Well] : healing well [Bleeding] : no active bleeding [Foul Odor] : no foul smell [Serosanguinous Drainage] : no serosanguinous drainage [Purulent Drainage] : no purulent drainage [Erythema] : not erythematous [Warm] : not warm [Tender] : not tender

## 2019-08-16 NOTE — REASON FOR VISIT
[Patient] : patient [Parents] : parents [de-identified] : Pulmonary valve replacement with a 27mm Inspiris Resilia aortic  valve [de-identified] : 08/05/19 [de-identified] : 11 [de-identified] : 34 yr old male with long standing PI, RV dilation (his right ventricle indexed volume is > 160 ml/m2). He is now s/p placement of a  #27mm Inspiris Resillia Pulmonary valve.  Post op course uncomplicated and he was discharged without incident. He was discharged on Coumadin for the PVR bioprosthesis. He presents today for post op appointment

## 2019-08-16 NOTE — ASSESSMENT
[FreeTextEntry1] : 34 yr old male with h/o severe PI now s/p PVR, doing well at home, recovering nicely. Patient and parents deny any respiratory difficulties, chest pain, palpitations, diaphoresis, dizziness or syncope. Report that Denis has been very active, and walks without difficulty.  He is taking Coumadin alternating 4mg and 5mg every other day. They have no immediate concerns today.\par \par Echocardiogram at discharge on 8/5/19: \par Summary:\par  1. History of subaortic VSD and coarctation of aorta s/p PA band and coarctation repair by subclavian flap method. s/p PA band take down and VSD patch closure. Subaortic membrane s/p resection. Subsequently balloon dilation of recurrent coarctation. Severe pulmonary insufficiency.\par  2. S/p pulmonary valve replacement with a 27 mm bovine pericardial valve.\par  3. No evidence of neopulmonary stenosis.\par  4. No evidence of neopulmonary regurgitation.\par  5. No residual left ventricular outflow tract obstruction.\par  6. Mild aortic valve regurgitation.\par  7. Dyskinesia of the interventricular septum.\par  8. Dilated right ventricle.\par  9. Mild global hypokinesia of the right ventricle.\par 10. Mild global hypokinesia of the left ventricle.\par \par Echocardiogram today is pending\par \par Will continue Coumadin for 3 months and transition to ASA for the life of the valve.\par Wound care reinforced\par No further office visit for wound surveillance\par

## 2019-08-22 LAB
INR PPP: 5.48 RATIO
PT BLD: 65.9 SEC

## 2019-09-04 ENCOUNTER — RESULT REVIEW (OUTPATIENT)
Age: 34
End: 2019-09-04

## 2019-09-04 LAB
INR PPP: 3.55
PT BLD: 42

## 2019-09-11 ENCOUNTER — OTHER (OUTPATIENT)
Age: 34
End: 2019-09-11

## 2019-09-11 LAB
INR PPP: 3.13 RATIO
PT BLD: 37 SEC

## 2019-09-25 ENCOUNTER — OTHER (OUTPATIENT)
Age: 34
End: 2019-09-25

## 2019-09-25 LAB
INR PPP: 2.29 RATIO
PT BLD: 26.8 SEC

## 2019-10-10 ENCOUNTER — APPOINTMENT (OUTPATIENT)
Dept: ELECTROPHYSIOLOGY | Facility: CLINIC | Age: 34
End: 2019-10-10
Payer: MEDICARE

## 2019-10-10 PROCEDURE — 93296 REM INTERROG EVL PM/IDS: CPT

## 2019-10-10 PROCEDURE — 93294 REM INTERROG EVL PM/LDLS PM: CPT

## 2019-10-18 ENCOUNTER — APPOINTMENT (OUTPATIENT)
Dept: ELECTROPHYSIOLOGY | Facility: CLINIC | Age: 34
End: 2019-10-18
Payer: MEDICARE

## 2019-10-18 ENCOUNTER — RESULT REVIEW (OUTPATIENT)
Age: 34
End: 2019-10-18

## 2019-10-18 LAB
INR PPP: 2.58 RATIO
PT BLD: 30 SEC

## 2019-10-18 PROCEDURE — 93280 PM DEVICE PROGR EVAL DUAL: CPT

## 2019-11-14 ENCOUNTER — RESULT CHARGE (OUTPATIENT)
Age: 34
End: 2019-11-14

## 2019-11-18 ENCOUNTER — APPOINTMENT (OUTPATIENT)
Dept: PEDIATRIC CARDIOLOGY | Facility: CLINIC | Age: 34
End: 2019-11-18
Payer: MEDICARE

## 2019-11-18 VITALS
OXYGEN SATURATION: 97 % | HEART RATE: 85 BPM | BODY MASS INDEX: 23.55 KG/M2 | DIASTOLIC BLOOD PRESSURE: 68 MMHG | WEIGHT: 129.63 LBS | RESPIRATION RATE: 20 BRPM | HEIGHT: 62.2 IN | SYSTOLIC BLOOD PRESSURE: 135 MMHG

## 2019-11-18 PROCEDURE — 93325 DOPPLER ECHO COLOR FLOW MAPG: CPT

## 2019-11-18 PROCEDURE — 93000 ELECTROCARDIOGRAM COMPLETE: CPT

## 2019-11-18 PROCEDURE — 93320 DOPPLER ECHO COMPLETE: CPT

## 2019-11-18 PROCEDURE — 93303 ECHO TRANSTHORACIC: CPT

## 2019-11-18 PROCEDURE — 99215 OFFICE O/P EST HI 40 MIN: CPT | Mod: 25

## 2019-11-18 RX ORDER — WARFARIN 5 MG/1
5 TABLET ORAL
Qty: 30 | Refills: 5 | Status: DISCONTINUED | COMMUNITY
Start: 2019-08-08 | End: 2019-11-18

## 2019-11-18 RX ORDER — FUROSEMIDE 40 MG/1
40 TABLET ORAL DAILY
Qty: 10 | Refills: 0 | Status: DISCONTINUED | COMMUNITY
Start: 2019-08-16 | End: 2019-11-18

## 2019-11-18 RX ORDER — WARFARIN 1 MG/1
1 TABLET ORAL
Qty: 120 | Refills: 3 | Status: DISCONTINUED | COMMUNITY
Start: 2019-08-08 | End: 2019-11-18

## 2019-11-18 RX ORDER — ASPIRIN 81 MG
81 TABLET, DELAYED RELEASE (ENTERIC COATED) ORAL
Refills: 6 | Status: ACTIVE | COMMUNITY

## 2019-11-18 NOTE — DISCUSSION/SUMMARY
[Needs SBE Prophylaxis] : [unfilled]  needs bacterial endocarditis prophylaxis. SBE prophylaxis is indicated for dental and invasive ENT procedures. (Circulation. 2007; 116: 7067-7991) [Influenza vaccine is recommended] : Influenza vaccine is recommended [Participate only in Mild PE activities] : [unfilled] may participate ONLY IN MILD physical education activities such as [place] [employer] games, golf, and badminton. 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  His coarctation was successfully repaired and a PA band placed at the initial surgery through a left thoracotomy.  He subsequently underwent closure of the VSD with SCAVB as a complication.  he was dependent on an epicardial pacemaker that was eventually replaced with a transvenous device in the left infraclavicular region that became infect and was removed along with the leads and a right sided dual chamber device implanted.  When the PA band was removed, there was damage to the native pulmonary valve with subsequent pulmonary regurgitation that eventually resulted in RV enlargement.  A joint discussion with myself and the parents as well as at our combined medical-surgical conference supported the placement of a valved conduit to diminish the effect on the RV of the severe pulmonary artery regurgitation.\par \par He is now 11 days post-op and doing beautifully.\par \par Instructions to parents:\par 1.  Reduce lasix to 40 mg once daily for one more week and discontinue.\par 2.  Continue alternating days of 4 and 5 mg of warfarin and keep in touch with our NP, Heather Mariano who will try and keep the INR somewhere between 2-2.5.\par No heavy lifting or activities where he would experience any jostling that could injure his chest.\par 3.  We will follow-up with him in the regular clinic in early December or sooner, if any symptoms develop "out of the ordinary".\par 4.  Please clip his fingernails very short to prevent superinfection of his sternotomy incision accidentally if he scratches it to calm an itch.

## 2019-11-18 NOTE — REVIEW OF SYSTEMS
[Nl] : Endocrine [Feeling Poorly] : not feeling poorly (malaise) [Fever] : no fever [Wgt Loss (___ Lbs)] : no recent weight loss [Pallor] : not pale [Eye Discharge] : no eye discharge [Redness] : no redness [Change in Vision] : no change in vision [Nasal Stuffiness] : no nasal congestion [Sore Throat] : no sore throat [Earache] : no earache [Loss Of Hearing] : no hearing loss [Cyanosis] : no cyanosis [Edema] : no edema [Diaphoresis] : not diaphoretic [Chest Pain] : no chest pain or discomfort [Exercise Intolerance] : no persistence of exercise intolerance [Palpitations] : no palpitations [Orthopnea] : no orthopnea [Fast HR] : no tachycardia [Tachypnea] : not tachypneic [Wheezing] : no wheezing [Cough] : no cough [Shortness Of Breath] : not expressed as feeling short of breath [Vomiting] : no vomiting [Diarrhea] : no diarrhea [Abdominal Pain] : no abdominal pain [Decrease In Appetite] : appetite not decreased [Fainting (Syncope)] : no fainting [Seizure] : no seizures [Headache] : no headache [Dizziness] : no dizziness [Limping] : no limping [Joint Pains] : no arthralgias [Joint Swelling] : no joint swelling [Rash] : no rash [Wound problems] : no wound problems [Easy Bruising] : no tendency for easy bruising [Swollen Glands] : no lymphadenopathy [Easy Bleeding] : no ~M tendency for easy bleeding [Nosebleeds] : no epistaxis [Sleep Disturbances] : ~T no sleep disturbances [Hyperactive] : no hyperactive behavior [Depression] : no depression [Anxiety] : no anxiety [Failure To Thrive] : no failure to thrive [Short Stature] : short stature was not noted [Jitteriness] : no jitteriness [Heat/Cold Intolerance] : no temperature intolerance [Dec Urine Output] : no oliguria

## 2019-11-18 NOTE — CONSULT LETTER
[Today's Date] : [unfilled] [Name] : Name: [unfilled] [] : : ~~ [Today's Date:] : [unfilled] [Dear  ___:] : Dear Dr. [unfilled]: [Consult] : I had the pleasure of evaluating your patient, [unfilled]. My full evaluation follows. [Consult - Multiple Provider] : Thank you very much for allowing us to participate in the care of this patient. If you have any questions, please do not hesitate to contact us. [Sincerely,] : Sincerely, [FreeTextEntry4] : Trev Gillespie MD [FreeTextEntry5] : [address] [FreeTextEntry6] : [address] [FreeTextEntry7] : [place] [address] [FreeTextEntry8] : [phone]

## 2019-11-18 NOTE — HISTORY OF PRESENT ILLNESS
[FreeTextEntry1] : I saw Denis in follow-up after discharge form [facility] following surgery to implant a pulmonary valved conduit for severe pulmonary artery regurgitation on 8/5/2019 and moderate right ventricular enlargement.  Denis originally was evaluated and operated on for coarctation of the aorta and VSD. His initial post-operative ICU course was uncomplicated. \par \par The coarctation was repaired in infancy (through a left thoracotomy) following a pre-operative cerebrovascular event and cardiac arrest prior to his transfer to [facility] from [facility]. and he also had a PA band placed at that time.  He then had his VSD closed at a later date through a median sternotomy with the complication of complete AV block and the need for a pacemaker.  Eventually, he was switched to a transvenous dual chambered pacemaker implanted in the left infraclavicular region.  This device had to be removed some time after that due to an infection.  Subsequently, he had another dual chambered device implanted in the right infraclavicular region that is still in place and functioning well today.\par \par DENIS DAVIS is now status post Pulmonary valve replacement with a 27mm Inspiris Resilia aortic valve and he is here for a post-op visit. \par Surgery Date: 08/05/19 Post-Op Day # 11 34 yr old male with long standing PI, RV dilation (his right ventricle indexed volume is > 160 ml/m2). He is now s/p placement of a #27mm Inspiris Resillia Pulmonary valve. Post op course uncomplicated and he was discharged without incident. He was discharged on Coumadin for the PVR bioprosthesis. He presents today for post op appointment. \par Patient seen with parents. History obtained from patient and parents. \par (This paragraph copied from the note of Christy Guzman NP who saw this patient with me simultaneously).\par \par Since discharge, Denis has done well.  In fact, when he did arrive home, he bounded up the stairs in his house without problems and has not complained of chest pains, shortness of breath nor any other symptoms since returning home.  He is on a regular diet and his only medications now are Lasix 40mg orally twice daily and warfarin alternating 5 and 4 mg every other day.  We have been following his INR at home.  He has had no bruising or bleeding.

## 2019-11-18 NOTE — CARDIOLOGY SUMMARY
[Today's Date] : [unfilled] [FreeTextEntry1] : Atrial sensing, ventricular pacing with 100% capture [FreeTextEntry2] : Formal read pending.  My reading is that there is a very mild gradient across the pulmonary valve of less than 100 (The previous is incorrect,; it should read less than 100 mmHg; edited on 11/18/2019;PRISCA) mmHg and no pulmonary regurgitation.  The TR jet suggests an RV pressure of about 30 mmHg.  The left ventricular function is excellent and there is no pericardial effusion noted.

## 2019-11-18 NOTE — REASON FOR VISIT
[Follow-Up] : a follow-up visit for [Pulmonary Valve Insufficiency] : pulmonary valve insufficiency [Parents] : parents [FreeTextEntry3] : S/P COA Repair and VSD Repair, Pacemaker, Pulmonary Valve Replacement.

## 2019-11-18 NOTE — PHYSICAL EXAM
[General Appearance - Alert] : alert [General Appearance - Well Nourished] : well nourished [General Appearance - In No Acute Distress] : in no acute distress [General Appearance - Well-Appearing] : well appearing [General Appearance - Well Developed] : well developed [Attitude Uncooperative] : cooperative [Appearance Of Head] : the head was normocephalic [Facies] : the head and face were normal in appearance [Sclera] : the sclera were normal [Examination Of The Oral Cavity] : mucous membranes were moist and pink [Outer Ear] : the ears and nose were normal in appearance [Oropharynx] : the oropharynx was normal [Respiration, Rhythm And Depth] : normal respiratory rhythm and effort [No Cough] : no cough [Stridor] : no stridor was observed [Normal Chest Appearance] : the chest was normal in appearance [Chest Visual Inspection Thoracic Deformity] : no chest wall deformity [Chest Palpation Tender Sternum] : no chest wall tenderness [No Sternal Instability] : no sternal instability [Heart Rate And Rhythm] : normal heart rate and rhythm [Apical Impulse] : quiet precordium with normal apical impulse [No Murmur] : no murmurs  [Heart Sounds Gallop] : no gallops [Heart Sounds Pericardial Friction Rub] : no pericardial rub [Arterial Pulses] : normal upper and lower extremity pulses with no pulse delay [Heart Sounds Click] : no clicks [Capillary Refill Test] : normal capillary refill [Edema] : no edema [S2 Single] : was single [Systolic] : systolic [Ejection] : ejection [Med] : medium pitched [Early] : early [Base] : the murmur was transmitted to the base [RLSB] : RLSB [Holodiastolic] : holodiastolic [Low] : low pitched [Blowing] : blowing [Mid] : mid [Bowel Sounds] : normal bowel sounds [Abdomen Soft] : soft [Nondistended] : nondistended [Abdomen Tenderness] : non-tender [No HSM] : no hepatosplenomegaly noted [Palpable___cm below the RCM] : palpable [unfilled] cm below the right costal margin [Nail Clubbing] : no clubbing  or cyanosis of the fingers [Musculoskeletal Exam: Normal Movement Of All Extremities] : normal movements of all extremities [Musculoskeletal - Swelling] : no joint swelling or joint tenderness [Motor Tone] : muscle strength and tone were normal [Cervical Lymph Nodes Enlarged Anterior] : The anterior cervical nodes were normal [Cervical Lymph Nodes Enlarged Posterior] : The posterior cervical nodes were normal [] : no rash [Skin Lesions] : no lesions [Skin Turgor] : normal turgor [Skin Color & Pigmentation] : normal skin color and pigmentation [Sternotomy] : sternotomy [Unremarkable] : unremarkable [Mood] : mood and affect were appropriate for age [Demonstrated Behavior - Infant Nonreactive To Parents] : interactive [Normal] : abnormal  [5th Left ICS - MCL] : palpated at the 5th LICS in the midclavicular line [No Precordial Heave] : no precordial heave was noted [Normal] : normal [Normal Rate] : normal [Rhythm Regular] : regular [Normal S1] : normal S1 [No Gallop] : no gallop heard [II] : a grade 2 [I] : a grade 1 [Anasarca] : anasarca edema present [2+] : left 2+ [No Abnormalities] : the abdominal aorta was not enlarged and no bruit was heard [No Pulse Delay] : no pulse delay [No Pulse Discrepancy] : no pulse discrepancy detected [Full Pulse] : peripheral pulses were full [Apical Thrill] : no thrill palpable at the apex [Normal S2] : abnormal S2 [S3] : no S3 [S4] : no S4 [Click] : no click [Distant] : the heart sounds were ~L not distant [Pericardial Rub] : no pericardial rub [Right Carotid Bruit] : no bruit heard over the right carotid [Left Carotid Bruit] : no bruit heard over the left carotid [Fingers] :  capillary refill of the fingers was normal [Left Fingers] :  capillary refill of the left fingers was normal [Right Fingers] :  capillary refill of the right fingers was normal [Toes] :  capillary refill of the toes was normal [Left Toes] :  capillary refill of the left toes was normal [Right Toes] :  capillary refill of the right toes was normal [Venous Cord Palpable On The Right] : no palpable cord [Venous Cord Palpable On The Left] : no palpable cord [Dilated Superficial Collateral Veins Right Leg] : no dilated superficial collateral veins [Dilated Superficial Collateral Veins Left Leg] : no dilated superficial collateral veins [Liver Tender To Palpation] : not tender [Liver Enlarged] : not enlarged [Spleen Enlarged] : not enlarged [Spleen Tender] : not tender [FreeTextEntry1] : bilateral sub clavicular incisions

## 2019-11-19 ENCOUNTER — OTHER (OUTPATIENT)
Age: 34
End: 2019-11-19

## 2019-11-19 ENCOUNTER — APPOINTMENT (OUTPATIENT)
Dept: NEUROLOGY | Facility: CLINIC | Age: 34
End: 2019-11-19
Payer: MEDICARE

## 2019-11-19 VITALS
BODY MASS INDEX: 23.74 KG/M2 | HEIGHT: 62 IN | HEART RATE: 66 BPM | SYSTOLIC BLOOD PRESSURE: 126 MMHG | WEIGHT: 129 LBS | DIASTOLIC BLOOD PRESSURE: 80 MMHG

## 2019-11-19 PROCEDURE — 99205 OFFICE O/P NEW HI 60 MIN: CPT

## 2019-11-19 PROCEDURE — 95816 EEG AWAKE AND DROWSY: CPT

## 2019-11-20 NOTE — CARDIOLOGY SUMMARY
[Today's Date] : [unfilled] [FreeTextEntry1] : atrial sensing ventricular pacing with 100% capture [FreeTextEntry2] : Summary:\par  1. History of subaortic VSD and coarctation of aorta s/p PA band and coarctation repair by subclavian flap method. s/p PA band take down and VSD patch closure. Subaortic membrane s/p resection. Subsequently balloon dilation of recurrent coarctation. Now s/p pulmonary valve replacement for severe pulmonary insufficiency.\par  2. S/p pulmonary valve replacement with a 27 mm bovine pericardial valve.\par  3. Mild neopulmonary stenosis.\par  4. Peak pulmonary valve gradient = 27.4 mmHg (mean grad = 13.7 mmHg).\par  5. Trivial neopulmonary regurgitation.\par  6. Trivial tricuspid valve regurgitation, peak systolic instantaneous gradient 32.8 mmHg.\par  7. Catheter traversing tricuspid valve into right ventricular cavit.\par  8. Mildly dilated right ventricle with qualitatively normal systolic function.\par  9. The sub-valvar LVOT is mildly narrowed with flow acceleration in this region up to 2m/s.\par 10. Normal left ventricular size, morphology and systolic function.\par 11. Mild to moderate aortic valve regurgitation.\par 12. Mildly dilated aortic root.\par 13. Aortic sinuses of Valsalva dimension (systole) = 3.27 cm (z = 2.24).\par 14. Limited imaging of the descending aorta by 2D and color flow, however the systolic Doppler profile was normal with a peak gradient ~25mmHg (unchanged).\par 15. No pericardial effusion.\par

## 2019-11-20 NOTE — DISCUSSION/SUMMARY
[Needs SBE Prophylaxis] : [unfilled]  needs bacterial endocarditis prophylaxis. SBE prophylaxis is indicated for dental and invasive ENT procedures. (Circulation. 2007; 116: 3798-4461) [May participate in all age-appropriate activities] : [unfilled] May participate in all age-appropriate activities. [FreeTextEntry1] : In summary, Denis is a 34 year old male with a complex cardiac history described above. Most recently he underwent pulmonary valve replacement with a 27-mm Inspiris Resilia aortic valve. He is doing extremely well and has returned to all of his previous activities. He attends his day program with increased energy throughout the day. \par \par I believe the device was picking up extraneous electro-mechanical interference due to being programmed in the unipolar mode.  Once he was reprogrammed to bipolar mode on both leads, he has had no further issues.\par \par We reviewed the importance of meticulous dental hygiene and the need for regular dental cleanings in addition to the need for lifelong prophylaxis with antibiotics under the current AHA guidelines. WE did discuss the importance of daily exercise and weight management with the upcoming holiday's.\par \par We placed a Holter today since we did hear some extra beats during his physical exam. We will continue to follow him annually or sooner if clinically indicated.\par \par

## 2019-11-20 NOTE — REVIEW OF SYSTEMS
[Anxiety] : anxiety [Feeling Poorly] : not feeling poorly (malaise) [Wgt Loss (___ Lbs)] : no recent weight loss [Fever] : no fever [Pallor] : not pale [Eye Discharge] : no eye discharge [Redness] : no redness [Change in Vision] : no change in vision [Nasal Stuffiness] : no nasal congestion [Sore Throat] : no sore throat [Earache] : no earache [Loss Of Hearing] : no hearing loss [Cyanosis] : no cyanosis [Edema] : no edema [Chest Pain] : no chest pain or discomfort [Diaphoresis] : not diaphoretic [Orthopnea] : no orthopnea [Palpitations] : no palpitations [Fast HR] : no tachycardia [Wheezing] : no wheezing [Tachypnea] : not tachypneic [Shortness Of Breath] : not expressed as feeling short of breath [Vomiting] : no vomiting [Diarrhea] : no diarrhea [Decrease In Appetite] : appetite not decreased [Abdominal Pain] : no abdominal pain [Fainting (Syncope)] : no fainting [Headache] : no headache [Dizziness] : no dizziness [Joint Pains] : no arthralgias [Joint Swelling] : no joint swelling [Rash] : no rash [Easy Bruising] : no tendency for easy bruising [Swollen Glands] : no lymphadenopathy [Easy Bleeding] : no ~M tendency for easy bleeding [Sleep Disturbances] : ~T no sleep disturbances [Nosebleeds] : no epistaxis [Jitteriness] : no jitteriness [Short Stature] : short stature was not noted [Heat/Cold Intolerance] : no temperature intolerance [Dec Urine Output] : no oliguria

## 2019-11-20 NOTE — CONSULT LETTER
[Today's Date] : [unfilled] [Name] : Name: [unfilled] [] : : ~~ [Today's Date:] : [unfilled] [Dear  ___:] : Dear Dr. [unfilled]: [Consult - Multiple Provider] : Thank you very much for allowing us to participate in the care of this patient. If you have any questions, please do not hesitate to contact us. [Consult] : I had the pleasure of evaluating your patient, [unfilled]. My full evaluation follows. [Sincerely,] : Sincerely, [FreeTextEntry5] : [address] [FreeTextEntry4] : Trev Gillespie MD [FreeTextEntry6] : [address] [FreeTextEntry7] : [place] [address] [FreeTextEntry8] : [phone]

## 2019-11-20 NOTE — REASON FOR VISIT
[Follow-Up] : a follow-up visit for [Parents] : parents [FreeTextEntry3] : COA and VSD repair,PM,PVR and developmental delay

## 2019-11-20 NOTE — HISTORY OF PRESENT ILLNESS
[FreeTextEntry1] : Denis was seen today for evaluation in the Adult Congenital Heart Program of the [facility].  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 His history is significant for CHB requiring pacemaker placement. In addition, Denis's past history is significant for a CVA and cardiac arrest prior to his initial PA band as an infant. A summary of his surgical/cath procedures is outlined below:\par \par 1. Repair of coarctation of aorta via subclavian flap technique and pulmonary artery banding, Dr. Alanis - [facility]\par 2. ( 2/21/1984), patch closure of perimembranous VSD, debanding of pulmonary artery, pericardial arterioplasty of the pulmonary artery, subaortic exploration with resection of redundant tricuspid insertions below the right coronary cusp, Dr. Alanis\par 3.(1988),  Resection of recurrent subaortic membrane, Dr. Alanis\par 4. (8/20/1997), Balloon angioplasty of recurrent coarctation, Dr. Huerta\par 5. (2/2/2005), Insertion of dual chamber pacemaker for CHB, Dr. Garcia\par 6. (3/4/2005), Removal of dual chamber pacemaker secondary to pacemaker pocket infection,(MSSA, s/p 6 weeks antibiotic therapy) Dr. Garcia\par 7. (3/7/2005), Replacement of dual chamber pacemaker, Dr. Garcia\par 8. (2/27/2013), Pacemaker generator change, Dr. Garcia\par 9. (8/5/2019), Pulmonary valve replacement with 27-mm Inspiris Resilia aortic valve, Dr. Crowley\par \par Denis completed 3 months of anticoagulation therapy with Coumadin and is now taking aspirin. He has also weaned off his Lasix. His parents note significantly more energy in Denis on a daily basis and he is no longer coming home form program and taking a nap. Denis"s cardiovascular review of systems is unremarkable. Specifically, he has no complaints of chest pain, palpitations, shortness of breath, peripheral edema, dizziness or syncope.\par \par There were some concerns regarding his pacemaker on routine transmission and he was seen by Jose Crump to see if adjustments needed to be made to his pacemaker. He continues to send routine transmissions without concerns.\par

## 2019-11-20 NOTE — PHYSICAL EXAM
[General Appearance - Alert] : alert [General Appearance - In No Acute Distress] : in no acute distress [Facies] : the head and face were normal in appearance [Appearance Of Head] : the head was normocephalic [Examination Of The Oral Cavity] : mucous membranes were moist and pink [Sclera] : the conjunctiva were normal [Normal Chest Appearance] : the chest was normal in appearance [Auscultation Breath Sounds / Voice Sounds] : breath sounds clear to auscultation bilaterally [Chest Visual Inspection Thoracic Deformity] : no chest wall deformity [Sternotomy] : sternotomy [Well-Healed] : well-healed [Unremarkable] : unremarkable [Normal S1] : normal  [Normal] : the heart rate was normal [S2 Fixed Splitting] : had fixed splitting [II] : a grade 2/6 [Systolic] : systolic [LUSB] : LUSB [] : no hepato-splenomegaly [Nondistended] : nondistended [Nail Clubbing] : no clubbing  or cyanosis of the fingers [Musculoskeletal Exam: Normal Movement Of All Extremities] : normal movements of all extremities [Motor Tone] : muscle strength and tone were normal [Musculoskeletal - Swelling] : no joint swelling or joint tenderness [Skin Lesions] : no lesions [Abnormal Walk] : normal gait [Skin Turgor] : normal turgor [Demonstrated Behavior - Infant Nonreactive To Parents] : interactive [Mood] : mood and affect were appropriate for age [Demonstrated Behavior] : normal behavior [FreeTextEntry1] : bilateral infraclavicular incisions [Normal] : abnormal

## 2019-12-28 NOTE — ASSESSMENT
[FreeTextEntry1] : CP, seizures, well controlled\par syncope PPM, cardiac surgeries in past\par Dystonia- late onset RUE.\par Had avoided botox in past, had 3 inj prior, no effect.  Worried about systemic effects.\par Vit D, Ca for CBZ risk of bone loss.  Bone density test, high risk due to inactivity, epilepsy, fair skin, chronic CBZ use.\par Consider alt AED with lower risk of osteoporosis in future.\par \par \par

## 2019-12-28 NOTE — PHYSICAL EXAM
[General Appearance - Well Nourished] : well nourished [Affect] : the affect was normal [General Appearance - Well-Appearing] : healthy appearing [Mood] : the mood was normal [Repeating Phrases] : no difficulty repeating a phrase [Comprehension] : comprehension intact [Cranial Nerves Trigeminal (V)] : facial sensation intact symmetrically [Cranial Nerves Facial (VII)] : face symmetrical [Cranial Nerves Oculomotor (III)] : extraocular motion intact [Cranial Nerves Vestibulocochlear (VIII)] : hearing was intact bilaterally [Cranial Nerves Glossopharyngeal (IX)] : tongue and palate midline [Motor Handedness Right-Handed] : the patient is right hand dominant [Cranial Nerves Hypoglossal (XII)] : there was no tongue deviation with protrusion [Cranial Nerves Accessory (XI - Cranial And Spinal)] : head turning and shoulder shrug symmetric [Motor Strength Upper Extremities Right] : there was weakness of the right upper extremity [Motor Strength Upper Extremities Left] : there was weakness of the left upper extremity [Sensation Tactile Decrease] : light touch was intact [Outer Ear] : the ears and nose were normal in appearance [Sclera] : the sclera and conjunctiva were normal [Neck Appearance] : the appearance of the neck was normal [Heart Rate And Rhythm] : heart rate was normal and rhythm regular [Abdomen Tenderness] : non-tender [Skin Lesions] : no skin lesions [Nail Clubbing] : no clubbing  or cyanosis of the fingernails [] : no rash [Fluency] : fluency not intact [Reading] : difficulty reading [Past History] : inadequate knowledge of personal past history [Past-pointing] : there was no past-pointing [FreeTextEntry6] : right inc tone, dystonia of wrist, arm.  RLE circumducts, toe drag with walking.  [FreeTextEntry8] : RUE motor control, clumsy [FreeTextEntry1] : murmur noted

## 2019-12-28 NOTE — REVIEW OF SYSTEMS
[As Noted in HPI] : as noted in HPI [As noted in HPI] : as noted in HPI [Negative] : Psychiatric [SOB on Exertion] : no shortness of breath during exertion [Orthopnea] : no orthopnea

## 2019-12-28 NOTE — HISTORY OF PRESENT ILLNESS
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 His history is significant for CHB requiring pacemaker placement. history is significant for surgery of heart at age 1weekm req 6 weeks on ventilator.  a CVA and cardiac arrest  as an infant.\par Seizures since infancy thereafter on PB, then CBZ.   GTCS, full body convulsions, lasting 2-5min.\par No recent breakthrough seizures since about age 16-17\par \par Episode LOC 2005.  PPM since 2005 \par \par Right hand dystonia, paresis, cerebral palsy. Was evaluated for botox for RUE, but did not continue.\par Achilles surgery in past. \par scoliosis surgery.\par Born FT, CC, Apgars 10 @1 and 5. \par \par Likes to watch hockey, dependent on family, bocce ball, movies.\par Taking Artane 5mg bid (max 15 tid in past), Tegretol brand 200/400mg, asa 81/d, Vt D3 2000mcg\par \par AEDs PHT and PB  in infancy only.

## 2019-12-28 NOTE — CONSULT LETTER
[Dear  ___] : Dear  [unfilled], [Consult Letter:] : I had the pleasure of evaluating your patient, [unfilled]. [( Thank you for referring [unfilled] for consultation for _____ )] : Thank you for referring [unfilled] for consultation for [unfilled] [Consult Closing:] : Thank you very much for allowing me to participate in the care of this patient.  If you have any questions, please do not hesitate to contact me. [Please see my note below.] : Please see my note below. [Sincerely,] : Sincerely, [FreeTextEntry2] : Trev Gillespie MD.\par Address: [address] [place] [address] [address], [place] [address] [FreeTextEntry3] : Mo Nixon MD, WINDY\par Board Certified: Neurology, Clinical Neurophysiology, Epilepsy\par , Department of Neurology\par Epilepsy Fellowship \par [facility]\par \par

## 2020-01-08 ENCOUNTER — APPOINTMENT (OUTPATIENT)
Dept: ELECTROPHYSIOLOGY | Facility: CLINIC | Age: 35
End: 2020-01-08
Payer: MEDICARE

## 2020-01-08 DIAGNOSIS — I44.0 ATRIOVENTRICULAR BLOCK, FIRST DEGREE: ICD-10-CM

## 2020-01-08 PROCEDURE — 93280 PM DEVICE PROGR EVAL DUAL: CPT

## 2020-01-15 ENCOUNTER — APPOINTMENT (OUTPATIENT)
Dept: ELECTROPHYSIOLOGY | Facility: CLINIC | Age: 35
End: 2020-01-15

## 2020-02-13 LAB
25(OH)D3 SERPL-MCNC: 38.5 NG/ML
ALBUMIN SERPL ELPH-MCNC: 4.9 G/DL
ALP BLD-CCNC: 90 U/L
ALT SERPL-CCNC: 22 U/L
ANION GAP SERPL CALC-SCNC: 19 MMOL/L
AST SERPL-CCNC: 23 U/L
BASOPHILS # BLD AUTO: 0.04 K/UL
BASOPHILS NFR BLD AUTO: 0.6 %
BILIRUB DIRECT SERPL-MCNC: 0.1 MG/DL
BILIRUB INDIRECT SERPL-MCNC: 0.2 MG/DL
BILIRUB SERPL-MCNC: 0.3 MG/DL
BUN SERPL-MCNC: 12 MG/DL
CALCIUM SERPL-MCNC: 9.5 MG/DL
CARBAMAZEPINE SERPL-MCNC: 11.2 UG/ML
CHLORIDE SERPL-SCNC: 101 MMOL/L
CO2 SERPL-SCNC: 21 MMOL/L
CREAT SERPL-MCNC: 0.65 MG/DL
EOSINOPHIL # BLD AUTO: 0.06 K/UL
EOSINOPHIL NFR BLD AUTO: 0.8 %
GLUCOSE SERPL-MCNC: 65 MG/DL
HCT VFR BLD CALC: 44.8 %
HGB BLD-MCNC: 15 G/DL
IMM GRANULOCYTES NFR BLD AUTO: 0.7 %
LYMPHOCYTES # BLD AUTO: 1.3 K/UL
LYMPHOCYTES NFR BLD AUTO: 18.2 %
MAN DIFF?: NORMAL
MCHC RBC-ENTMCNC: 28.6 PG
MCHC RBC-ENTMCNC: 33.5 GM/DL
MCV RBC AUTO: 85.3 FL
MONOCYTES # BLD AUTO: 0.81 K/UL
MONOCYTES NFR BLD AUTO: 11.3 %
NEUTROPHILS # BLD AUTO: 4.88 K/UL
NEUTROPHILS NFR BLD AUTO: 68.4 %
PLATELET # BLD AUTO: 253 K/UL
POTASSIUM SERPL-SCNC: 4.5 MMOL/L
PROT SERPL-MCNC: 7.4 G/DL
RBC # BLD: 5.25 M/UL
RBC # FLD: 12.4 %
SODIUM SERPL-SCNC: 141 MMOL/L
WBC # FLD AUTO: 7.14 K/UL

## 2020-02-18 ENCOUNTER — APPOINTMENT (OUTPATIENT)
Dept: NEUROLOGY | Facility: CLINIC | Age: 35
End: 2020-02-18
Payer: MEDICARE

## 2020-02-18 ENCOUNTER — FORM ENCOUNTER (OUTPATIENT)
Age: 35
End: 2020-02-18

## 2020-02-18 VITALS
DIASTOLIC BLOOD PRESSURE: 73 MMHG | HEART RATE: 68 BPM | WEIGHT: 129 LBS | HEIGHT: 62 IN | BODY MASS INDEX: 23.74 KG/M2 | SYSTOLIC BLOOD PRESSURE: 131 MMHG

## 2020-02-18 PROCEDURE — 99215 OFFICE O/P EST HI 40 MIN: CPT

## 2020-02-18 NOTE — DISCUSSION/SUMMARY
[FreeTextEntry1] : Seizure disorder and dystonia from cerebral palsy. Could increase artane back up, and one could try BTX again. I think it would be safe at his age, but they want to think about it. If they decide to try, can be scheduled.

## 2020-02-18 NOTE — HISTORY OF PRESENT ILLNESS
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 His history is significant for CHB requiring pacemaker placement. History is significant for surgery of heart at age 1week, requiring 6 weeks on ventilator.  A CVA and cardiac arrest  as an infant.\par Seizures since infancy thereafter on PB, then CBZ.  GTCS, full body convulsions, lasting 2-5min.\par No recent breakthrough seizures since about age 16-17.\par \par He was also diagnosed with dystonia, due to right hand movements, started about age 16, seen at [facility] movement disorders (Krystian Mahoney), started on Botox, not clear if is worked, stopped for safety concerns. Taking Artane 5mg bid (max 15 tid in past, no side effects), Tegretol brand 200/400mg, asa 81/d, Vitamin D3 2000mcg. No family history.\par \par Needs help with ADLs, especially fine motor. \par

## 2020-02-18 NOTE — PHYSICAL EXAM
[Repeating Phrases] : no difficulty repeating a phrase [Comprehension] : comprehension intact [Cranial Nerves Oculomotor (III)] : extraocular motion intact [Cranial Nerves Trigeminal (V)] : facial sensation intact symmetrically [Cranial Nerves Facial (VII)] : face symmetrical [Cranial Nerves Vestibulocochlear (VIII)] : hearing was intact bilaterally [Cranial Nerves Accessory (XI - Cranial And Spinal)] : head turning and shoulder shrug symmetric [Cranial Nerves Glossopharyngeal (IX)] : tongue and palate midline [Motor Handedness Right-Handed] : the patient is right hand dominant [Cranial Nerves Hypoglossal (XII)] : there was no tongue deviation with protrusion [Motor Strength Upper Extremities Right] : there was weakness of the right upper extremity [Motor Strength Upper Extremities Left] : there was weakness of the left upper extremity [Sensation Tactile Decrease] : light touch was intact [Naming Objects] : no difficulty naming common objects [Fluency] : fluency not intact [Past History] : inadequate knowledge of personal past history [Past-pointing] : there was no past-pointing [FreeTextEntry4] : Know year, not month. Can name [FreeTextEntry6] : with walking.  [FreeTextEntry1] : Tone increased in the right arm. Right hand dystonia with finger extension and ulnar wrist deviation. Gait wide based, toe walking with right leg circumduction. No other obvious dystonia.  [FreeTextEntry8] : RUE motor control, clumsy

## 2020-02-19 ENCOUNTER — APPOINTMENT (OUTPATIENT)
Dept: RADIOLOGY | Facility: CLINIC | Age: 35
End: 2020-02-19
Payer: MEDICARE

## 2020-02-19 ENCOUNTER — OUTPATIENT (OUTPATIENT)
Dept: OUTPATIENT SERVICES | Facility: HOSPITAL | Age: 35
LOS: 1 days | End: 2020-02-19
Payer: COMMERCIAL

## 2020-02-19 DIAGNOSIS — Z92.89 PERSONAL HISTORY OF OTHER MEDICAL TREATMENT: Chronic | ICD-10-CM

## 2020-02-19 DIAGNOSIS — Z00.8 ENCOUNTER FOR OTHER GENERAL EXAMINATION: ICD-10-CM

## 2020-02-19 DIAGNOSIS — Z98.890 OTHER SPECIFIED POSTPROCEDURAL STATES: Chronic | ICD-10-CM

## 2020-02-19 PROCEDURE — 77080 DXA BONE DENSITY AXIAL: CPT

## 2020-02-19 PROCEDURE — 77080 DXA BONE DENSITY AXIAL: CPT | Mod: 26

## 2020-04-09 ENCOUNTER — APPOINTMENT (OUTPATIENT)
Dept: ELECTROPHYSIOLOGY | Facility: CLINIC | Age: 35
End: 2020-04-09

## 2020-06-08 ENCOUNTER — APPOINTMENT (OUTPATIENT)
Dept: ELECTROPHYSIOLOGY | Facility: CLINIC | Age: 35
End: 2020-06-08
Payer: MEDICARE

## 2020-06-08 PROCEDURE — 93298 REM INTERROG DEV EVAL SCRMS: CPT

## 2020-06-08 PROCEDURE — G2066: CPT

## 2020-06-24 ENCOUNTER — RX RENEWAL (OUTPATIENT)
Age: 35
End: 2020-06-24

## 2020-07-09 ENCOUNTER — APPOINTMENT (OUTPATIENT)
Dept: ELECTROPHYSIOLOGY | Facility: CLINIC | Age: 35
End: 2020-07-09

## 2020-08-31 LAB
ALBUMIN SERPL ELPH-MCNC: 4.7 G/DL
ALP BLD-CCNC: 88 U/L
ALT SERPL-CCNC: 21 U/L
ANION GAP SERPL CALC-SCNC: 13 MMOL/L
AST SERPL-CCNC: 18 U/L
BASOPHILS # BLD AUTO: 0.02 K/UL
BASOPHILS NFR BLD AUTO: 0.3 %
BILIRUB DIRECT SERPL-MCNC: 0.1 MG/DL
BILIRUB INDIRECT SERPL-MCNC: 0.1 MG/DL
BILIRUB SERPL-MCNC: 0.2 MG/DL
BUN SERPL-MCNC: 10 MG/DL
CALCIUM SERPL-MCNC: 9.5 MG/DL
CARBAMAZEPINE SERPL-MCNC: 9.7 UG/ML
CHLORIDE SERPL-SCNC: 104 MMOL/L
CO2 SERPL-SCNC: 28 MMOL/L
CREAT SERPL-MCNC: 0.79 MG/DL
EOSINOPHIL # BLD AUTO: 0.13 K/UL
EOSINOPHIL NFR BLD AUTO: 2.1 %
GLUCOSE SERPL-MCNC: 95 MG/DL
HCT VFR BLD CALC: 46.9 %
HGB BLD-MCNC: 15 G/DL
IMM GRANULOCYTES NFR BLD AUTO: 0.5 %
LYMPHOCYTES # BLD AUTO: 1.17 K/UL
LYMPHOCYTES NFR BLD AUTO: 19 %
MAN DIFF?: NORMAL
MCHC RBC-ENTMCNC: 28.6 PG
MCHC RBC-ENTMCNC: 32 GM/DL
MCV RBC AUTO: 89.3 FL
MONOCYTES # BLD AUTO: 0.64 K/UL
MONOCYTES NFR BLD AUTO: 10.4 %
NEUTROPHILS # BLD AUTO: 4.17 K/UL
NEUTROPHILS NFR BLD AUTO: 67.7 %
PLATELET # BLD AUTO: 269 K/UL
POTASSIUM SERPL-SCNC: 4.4 MMOL/L
PROT SERPL-MCNC: 7.1 G/DL
RBC # BLD: 5.25 M/UL
RBC # FLD: 11.9 %
SODIUM SERPL-SCNC: 145 MMOL/L
WBC # FLD AUTO: 6.16 K/UL

## 2020-09-09 ENCOUNTER — APPOINTMENT (OUTPATIENT)
Dept: ELECTROPHYSIOLOGY | Facility: CLINIC | Age: 35
End: 2020-09-09
Payer: MEDICARE

## 2020-09-09 VITALS — DIASTOLIC BLOOD PRESSURE: 84 MMHG | RESPIRATION RATE: 14 BRPM | SYSTOLIC BLOOD PRESSURE: 122 MMHG | HEART RATE: 70 BPM

## 2020-09-09 PROCEDURE — 93280 PM DEVICE PROGR EVAL DUAL: CPT

## 2020-10-27 ENCOUNTER — APPOINTMENT (OUTPATIENT)
Dept: NEUROLOGY | Facility: CLINIC | Age: 35
End: 2020-10-27
Payer: MEDICARE

## 2020-10-27 VITALS
DIASTOLIC BLOOD PRESSURE: 82 MMHG | HEIGHT: 62 IN | HEART RATE: 78 BPM | WEIGHT: 128 LBS | SYSTOLIC BLOOD PRESSURE: 123 MMHG | BODY MASS INDEX: 23.55 KG/M2

## 2020-10-27 VITALS — TEMPERATURE: 97.7 F

## 2020-10-27 PROCEDURE — 99213 OFFICE O/P EST LOW 20 MIN: CPT

## 2020-10-27 PROCEDURE — 99072 ADDL SUPL MATRL&STAF TM PHE: CPT

## 2020-10-27 RX ORDER — AMOXICILLIN 500 MG/1
500 CAPSULE ORAL
Qty: 4 | Refills: 0 | Status: DISCONTINUED | COMMUNITY
Start: 2018-06-01 | End: 2020-10-27

## 2020-10-27 NOTE — ASSESSMENT
[FreeTextEntry1] : CP, seizures, well controlled x many years\par syncope PPM, cardiac surgeries in past\par Dystonia- late onset RUE.\par Had avoided botox in past, had 3 inj prior, no effect.  Worried about systemic effects.\par Vit D, Ca for CBZ risk of bone loss.  Bone density test, high risk due to inactivity, epilepsy, fair skin, chronic CBZ use.\par Consider alt AED with lower risk of osteoporosis in future.\par ie LEV, LAC, APT/OXC\par f/u bone density test 2022\par f/u 1yr\par \par

## 2020-10-27 NOTE — DATA REVIEWED
[de-identified] : 2003 MRI "NL" @[facility] [de-identified] : [facility] 2019 mild slowing [de-identified] : Labs: 8/20 cbc, bmp, lft CBZ 9.7

## 2020-10-27 NOTE — PHYSICAL EXAM
[General Appearance - Well Nourished] : well nourished [General Appearance - Well-Appearing] : healthy appearing [Affect] : the affect was normal [Mood] : the mood was normal [Repeating Phrases] : no difficulty repeating a phrase [Comprehension] : comprehension intact [Cranial Nerves Oculomotor (III)] : extraocular motion intact [Cranial Nerves Trigeminal (V)] : facial sensation intact symmetrically [Cranial Nerves Facial (VII)] : face symmetrical [Cranial Nerves Vestibulocochlear (VIII)] : hearing was intact bilaterally [Cranial Nerves Glossopharyngeal (IX)] : tongue and palate midline [Cranial Nerves Accessory (XI - Cranial And Spinal)] : head turning and shoulder shrug symmetric [Cranial Nerves Hypoglossal (XII)] : there was no tongue deviation with protrusion [Motor Handedness Right-Handed] : the patient is right hand dominant [Motor Strength Upper Extremities Right] : there was weakness of the right upper extremity [Motor Strength Upper Extremities Left] : there was weakness of the left upper extremity [Sensation Tactile Decrease] : light touch was intact [Sclera] : the sclera and conjunctiva were normal [Outer Ear] : the ears and nose were normal in appearance [Neck Appearance] : the appearance of the neck was normal [Heart Rate And Rhythm] : heart rate was normal and rhythm regular [Abdomen Tenderness] : non-tender [Nail Clubbing] : no clubbing  or cyanosis of the fingernails [] : no rash [Skin Lesions] : no skin lesions [Fluency] : fluency not intact [Reading] : difficulty reading [Past History] : inadequate knowledge of personal past history [Past-pointing] : there was no past-pointing [FreeTextEntry6] : right inc tone, dystonia of wrist, arm.  RLE circumducts, toe drag with walking.  [FreeTextEntry8] : RUE motor control, clumsy [FreeTextEntry1] : murmur noted

## 2020-10-27 NOTE — HISTORY OF PRESENT ILLNESS
[FreeTextEntry1] : No seizures.  Had pulm heart valve replacement 2020.  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 His history is significant for CHB requiring pacemaker placement. history is significant for surgery of heart at age 1weeks req 6 weeks on ventilator.  a CVA and cardiac arrest  as an infant.\par Seizures since infancy thereafter on PB, then CBZ.   GTCS, full body convulsions, lasting 2-5min.\par No recent breakthrough seizures since about age 16-17\par \par Episode LOC 2005.  PPM since 2005 \par \par Right hand dystonia, paresis, cerebral palsy. Was evaluated for botox for RUE, but did not continue.\par Achilles surgery in past. \par scoliosis surgery.\par Born FT, CC, Apgars 10 @1 and 5. \par \par Likes to watch hockey, dependent on family, bocce ball, movies.\par Taking Artane 5mg bid (max 15 tid in past), Tegretol brand 200/400mg, asa 81/d, Vt D3 2000mcg\par \par AEDs PHT and PB  in infancy only.

## 2020-10-27 NOTE — REVIEW OF SYSTEMS
[As Noted in HPI] : as noted in HPI [As noted in HPI] : as noted in HPI [Negative] : Heme/Lymph [SOB on Exertion] : no shortness of breath during exertion [Orthopnea] : no orthopnea

## 2020-10-27 NOTE — CONSULT LETTER
[Dear  ___] : Dear  [unfilled], [Consult Letter:] : I had the pleasure of evaluating your patient, [unfilled]. [( Thank you for referring [unfilled] for consultation for _____ )] : Thank you for referring [unfilled] for consultation for [unfilled] [Please see my note below.] : Please see my note below. [Consult Closing:] : Thank you very much for allowing me to participate in the care of this patient.  If you have any questions, please do not hesitate to contact me. [Sincerely,] : Sincerely, [FreeTextEntry2] : Trev Gillespie MD.\par Address: [address] [place] [address] [address], [place] [address] [FreeTextEntry3] : Mo Nixon MD, WINDY\par Board Certified: Neurology, Clinical Neurophysiology, Epilepsy\par , Department of Neurology\par Epilepsy Fellowship \par [facility]\par \par

## 2020-11-10 DIAGNOSIS — Z95.0 PRESENCE OF CARDIAC PACEMAKER: ICD-10-CM

## 2020-11-12 ENCOUNTER — APPOINTMENT (OUTPATIENT)
Dept: PEDIATRIC CARDIOLOGY | Facility: CLINIC | Age: 35
End: 2020-11-12
Payer: MEDICARE

## 2020-11-12 VITALS
HEART RATE: 78 BPM | HEIGHT: 64.17 IN | DIASTOLIC BLOOD PRESSURE: 66 MMHG | OXYGEN SATURATION: 97 % | WEIGHT: 128.31 LBS | BODY MASS INDEX: 21.91 KG/M2 | SYSTOLIC BLOOD PRESSURE: 124 MMHG | RESPIRATION RATE: 16 BRPM

## 2020-11-12 PROCEDURE — 99072 ADDL SUPL MATRL&STAF TM PHE: CPT

## 2020-11-12 PROCEDURE — 93303 ECHO TRANSTHORACIC: CPT

## 2020-11-12 PROCEDURE — 93320 DOPPLER ECHO COMPLETE: CPT

## 2020-11-12 PROCEDURE — 93000 ELECTROCARDIOGRAM COMPLETE: CPT

## 2020-11-12 PROCEDURE — 93325 DOPPLER ECHO COLOR FLOW MAPG: CPT

## 2020-11-12 PROCEDURE — 99215 OFFICE O/P EST HI 40 MIN: CPT | Mod: 25

## 2020-11-16 NOTE — CARDIOLOGY SUMMARY
[Today's Date] : [unfilled] [FreeTextEntry1] : DDD pacing with frequent PAC's. atrial and ventricular capture 100% [FreeTextEntry2] : Summary:\par 1.  {S,D,S } Situs solitus, D-ventricular looping, normally related great arteries.\par 2. History of subaortic VSD and coarctation of aorta s/p PA band and coarctation repair by\par subclavian flap method. s/p PA band take down and VSD patch closure. Subaortic\par membrane s/p resection. Subsequently balloon dilation of recurrent coarctation. Severe\par pulmonary insufficiency.\par 3. No residual left ventricular outflow tract obstruction.\par 4. Mild plus aortic valve regurgitation.\par 5. S/p pulmonary valve replacement with a 27 mm bovine pericardial valve.\par 6. Mild residual coarctation of the aorta The upper Giuliana is tortuous.\par 7. Low amplitude Doppler profile in the descending aorta.\par 8. Peak systolic descending aorta gradient = 28 mmHg.\par 9. The corrected gradient across the proximal descending aorta is 8 mmHg (calculation\par includes the proximal velocity).\par 10. Catheter traversing tricuspid valve into right ventricular cavit.\par 11. Mild neopulmonary regurgitation.\par 12. Mild neopulmonary stenosis.\par 13. Mild tricuspid valve regurgitation.\par 14. Mildly dilated right ventricle.\par 15. Qualitatively normal right ventricular systolic function.\par 16. Dyskinesia of the interventricular septum.\par 17. Normal left ventricular systolic function.\par 18. Left ventricular ejection fraction by 5/6 Area x Length is normal at 63 %.\par 19. Normal left ventricular diastolic function.\par 20. No pericardial effusion.\par

## 2020-11-16 NOTE — REVIEW OF SYSTEMS
[Feeling Poorly] : not feeling poorly (malaise) [Cyanosis] : no cyanosis [Edema] : no edema [Chest Pain] : no chest pain or discomfort [Exercise Intolerance] : no persistence of exercise intolerance [Palpitations] : no palpitations [Fast HR] : no tachycardia [Cough] : no cough [Shortness Of Breath] : not expressed as feeling short of breath [Decrease In Appetite] : appetite not decreased [Fainting (Syncope)] : no fainting [Headache] : no headache [Dizziness] : no dizziness [Easy Bruising] : no tendency for easy bruising [Easy Bleeding] : no ~M tendency for easy bleeding [Nosebleeds] : no epistaxis [Sleep Disturbances] : ~T no sleep disturbances [Anxiety] : no anxiety

## 2020-11-16 NOTE — CONSULT LETTER
[Today's Date] : [unfilled] [Name] : Name: [unfilled] [] : : ~~ [Today's Date:] : [unfilled] [Dear  ___:] : Dear Dr. [unfilled]: [Consult] : I had the pleasure of evaluating your patient, [unfilled]. My full evaluation follows. [Consult - Multiple Provider] : Thank you very much for allowing us to participate in the care of this patient. If you have any questions, please do not hesitate to contact us. [Sincerely,] : Sincerely, [FreeTextEntry4] : Trev Gillespie MD [FreeTextEntry5] : [address] [FreeTextEntry6] : [address] [FreeTextEntry7] : [place] [address] [FreeTextEntry8] : [phone] [de-identified] : Heather Mariano, MSN, CPNP-AC, PC\par Pediatric Cardiology, Adult Congenital Cardiology\par [facility]\par \par Pete De Luna MD, JANET\par Medical Director, Adult Congenital Heart Program\par Professor of Pediatrics\par [employer] [facility] [employer] [facility]\par

## 2020-11-16 NOTE — PHYSICAL EXAM
[General Appearance - Alert] : alert [Facies] : the head and face were normal in appearance [Sclera] : the conjunctiva were normal [] : no respiratory distress [Auscultation Breath Sounds / Voice Sounds] : breath sounds clear to auscultation bilaterally [Sternotomy] : sternotomy [Right Thoracotomy] : right thoracotomy [Well-Healed] : well-healed [Unremarkable] : unremarkable [Apical Impulse] : quiet precordium with normal apical impulse [Edema] : no edema [Regular-Premature Beats] : the rhythm was regular with premature beats [Normal S1] : normal  [Normal] : normal  [S2 Single] : was single [0] : right 0 [1+] : left 1+ [2+] : left 2+ [Systolic] : systolic [II] : a grade 2/6 [LUSB] : LUSB [Harsh] : harsh [Nail Clubbing] : no clubbing  or cyanosis of the fingers [Musculoskeletal Exam: Normal Movement Of All Extremities] : normal movements of all extremities [Abnormal Walk] : normal gait [Cervical Lymph Nodes Enlarged Anterior] : The anterior cervical nodes were normal [Cervical Lymph Nodes Enlarged Posterior] : The posterior cervical nodes were normal [Skin Turgor] : normal turgor [Skin Color & Pigmentation] : normal skin color and pigmentation [Demonstrated Behavior - Infant Nonreactive To Parents] : interactive [FreeTextEntry1] : follows commands, responds appropriately

## 2020-11-16 NOTE — HISTORY OF PRESENT ILLNESS
[FreeTextEntry1] : Denis was seen today for evaluation in the Adult Congenital Heart Program of [facility]. 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 His history is significant for CHB requiring pacemaker placement. In addition, Denis's past history is significant for a CVA and cardiac arrest prior to his initial PA band as an infant. A summary of his surgical/cath procedures is outlined below:\par \par 1. Repair of coarctation of aorta via subclavian flap technique and pulmonary artery banding, Dr. Alanis - [facility]\par 2. ( 2/21/1984), patch closure of perimembranous VSD, debanding of pulmonary artery, pericardial arterioplasty of the pulmonary artery, subaortic exploration with resection of redundant tricuspid insertions below the right coronary cusp, Dr. Alanis\par 3.(1988), Resection of recurrent subaortic membrane, Dr. Alanis\par 4. (8/20/1997), Balloon angioplasty of recurrent coarctation, Dr. Huerta\par 5. (2/2/2005), Insertion of dual chamber pacemaker for CHB, Dr. Garcia\par 6. (3/4/2005), Removal of dual chamber pacemaker secondary to pacemaker pocket infection,(MSSA, s/p 6 weeks antibiotic therapy) Dr. Garcia\par 7. (3/7/2005), Replacement of dual chamber pacemaker, Dr. Garcia\par 8. (2/27/2013), Pacemaker generator change, Dr. Garcia\par 9. (8/5/2019), Pulmonary valve replacement with 27-mm Inspiris Resilia aortic valve, Dr. Crowley\par \par Denis remains on Aspirin for his valve without any bleeding or bruising. Since his pulmonary valve replacement his parents note that he has more energy. He has been walking 3 miles every day. He does not have to stop to rest. His parents have kept him home from his program during COVID. From a cardiovascular perspective he does not appear to have any limitations. Specifically, he does not appear to have any complaints of chest pain, shortness of breath, palpitations, dizziness or syncope. There is no bleeding or bruising.\par \par His pacemaker is followed by Dr. Garcia.

## 2020-11-16 NOTE — DISCUSSION/SUMMARY
[Needs SBE Prophylaxis] : [unfilled]  needs bacterial endocarditis prophylaxis. SBE prophylaxis is indicated for dental and invasive ENT procedures. (Circulation. 2007; 116: 4260-3784) [May participate in all age-appropriate activities] : [unfilled] May participate in all age-appropriate activities. [Influenza vaccine is recommended] : Influenza vaccine is recommended [FreeTextEntry1] : In summary, Denis is a 35 year old male with a complex cardiac history described above. He underwent pulmonary valve replacement with a 27-mm Inspiris Resilia aortic valve in 2019. Since then his parents note him to have increased energy. he walks 3 miles daily and does not tire.\par \par We discussed with Denis's parents that as it is now over a year since his pulmonary valve replacement that  he should have repeat imaging to evaluate the degree of remodeling of his right ventricle and document the well functioning new pulmonary valve. Additionally, following a review of all of our notes and records it does not appear that Denis has had a head MRI to ever evaluate his intracranial anatomy in the setting of previous repair of his coarctation of the aorta. The incidence of intracranial abnormalities in patients with coarctation of the aorta may be as high as 8-10% so a one time screening is indicated in these patients.\par \par We reviewed the importance of meticulous dental hygiene and the need for regular dental cleanings with lifelong SBE prophylaxis required for all dental procedures. We will continue to follow him annually or sooner if clinically indicated.\par \par

## 2020-11-27 ENCOUNTER — NON-APPOINTMENT (OUTPATIENT)
Age: 35
End: 2020-11-27

## 2020-12-09 ENCOUNTER — APPOINTMENT (OUTPATIENT)
Dept: ELECTROPHYSIOLOGY | Facility: CLINIC | Age: 35
End: 2020-12-09
Payer: MEDICARE

## 2020-12-09 ENCOUNTER — APPOINTMENT (OUTPATIENT)
Dept: ELECTROPHYSIOLOGY | Facility: CLINIC | Age: 35
End: 2020-12-09

## 2020-12-09 PROCEDURE — 93294 REM INTERROG EVL PM/LDLS PM: CPT

## 2020-12-09 PROCEDURE — 93296 REM INTERROG EVL PM/IDS: CPT

## 2020-12-17 ENCOUNTER — FORM ENCOUNTER (OUTPATIENT)
Age: 35
End: 2020-12-17

## 2020-12-21 ENCOUNTER — RX RENEWAL (OUTPATIENT)
Age: 35
End: 2020-12-21

## 2021-02-08 ENCOUNTER — TRANSCRIPTION ENCOUNTER (OUTPATIENT)
Age: 36
End: 2021-02-08

## 2021-03-05 LAB
ALBUMIN SERPL ELPH-MCNC: 4.7 G/DL
ALP BLD-CCNC: 89 U/L
ALT SERPL-CCNC: 18 U/L
ANION GAP SERPL CALC-SCNC: 7 MMOL/L
AST SERPL-CCNC: 15 U/L
BASOPHILS # BLD AUTO: 0.02 K/UL
BASOPHILS NFR BLD AUTO: 0.3 %
BILIRUB DIRECT SERPL-MCNC: 0.1 MG/DL
BILIRUB INDIRECT SERPL-MCNC: 0.1 MG/DL
BILIRUB SERPL-MCNC: 0.2 MG/DL
BUN SERPL-MCNC: 12 MG/DL
CALCIUM SERPL-MCNC: 9.6 MG/DL
CARBAMAZEPINE SERPL-MCNC: 8.4 UG/ML
CHLORIDE SERPL-SCNC: 102 MMOL/L
CO2 SERPL-SCNC: 32 MMOL/L
CREAT SERPL-MCNC: 0.79 MG/DL
EOSINOPHIL # BLD AUTO: 0.08 K/UL
EOSINOPHIL NFR BLD AUTO: 1.3 %
GLUCOSE SERPL-MCNC: 116 MG/DL
HCT VFR BLD CALC: 44.6 %
HGB BLD-MCNC: 14.8 G/DL
IMM GRANULOCYTES NFR BLD AUTO: 0.5 %
LYMPHOCYTES # BLD AUTO: 1.45 K/UL
LYMPHOCYTES NFR BLD AUTO: 24.4 %
MAN DIFF?: NORMAL
MCHC RBC-ENTMCNC: 29.1 PG
MCHC RBC-ENTMCNC: 33.2 GM/DL
MCV RBC AUTO: 87.8 FL
MONOCYTES # BLD AUTO: 0.64 K/UL
MONOCYTES NFR BLD AUTO: 10.8 %
NEUTROPHILS # BLD AUTO: 3.72 K/UL
NEUTROPHILS NFR BLD AUTO: 62.7 %
PLATELET # BLD AUTO: 266 K/UL
POTASSIUM SERPL-SCNC: 4.3 MMOL/L
PROT SERPL-MCNC: 7.2 G/DL
RBC # BLD: 5.08 M/UL
RBC # FLD: 11.8 %
SODIUM SERPL-SCNC: 141 MMOL/L
WBC # FLD AUTO: 5.94 K/UL

## 2021-03-10 ENCOUNTER — RX CHANGE (OUTPATIENT)
Age: 36
End: 2021-03-10

## 2021-03-11 ENCOUNTER — APPOINTMENT (OUTPATIENT)
Dept: ELECTROPHYSIOLOGY | Facility: CLINIC | Age: 36
End: 2021-03-11
Payer: MEDICARE

## 2021-03-11 ENCOUNTER — RX CHANGE (OUTPATIENT)
Age: 36
End: 2021-03-11

## 2021-03-11 ENCOUNTER — NON-APPOINTMENT (OUTPATIENT)
Age: 36
End: 2021-03-11

## 2021-03-11 PROCEDURE — 93296 REM INTERROG EVL PM/IDS: CPT

## 2021-03-11 PROCEDURE — 93294 REM INTERROG EVL PM/LDLS PM: CPT

## 2021-06-07 ENCOUNTER — RX RENEWAL (OUTPATIENT)
Age: 36
End: 2021-06-07

## 2021-06-08 ENCOUNTER — TRANSCRIPTION ENCOUNTER (OUTPATIENT)
Age: 36
End: 2021-06-08

## 2021-06-08 ENCOUNTER — RX CHANGE (OUTPATIENT)
Age: 36
End: 2021-06-08

## 2021-06-08 RX ORDER — MULTIVITAMIN/IRON/FOLIC ACID 18MG-0.4MG
500-400 TABLET ORAL TWICE DAILY
Qty: 180 | Refills: 1 | Status: DISCONTINUED | COMMUNITY
Start: 2020-10-27 | End: 2021-06-08

## 2021-06-10 ENCOUNTER — APPOINTMENT (OUTPATIENT)
Dept: ELECTROPHYSIOLOGY | Facility: CLINIC | Age: 36
End: 2021-06-10
Payer: MEDICARE

## 2021-06-10 ENCOUNTER — APPOINTMENT (OUTPATIENT)
Dept: ELECTROPHYSIOLOGY | Facility: CLINIC | Age: 36
End: 2021-06-10

## 2021-06-10 ENCOUNTER — NON-APPOINTMENT (OUTPATIENT)
Age: 36
End: 2021-06-10

## 2021-06-10 PROCEDURE — 93294 REM INTERROG EVL PM/LDLS PM: CPT

## 2021-06-10 PROCEDURE — 93296 REM INTERROG EVL PM/IDS: CPT

## 2021-08-12 DIAGNOSIS — E55.9 VITAMIN D DEFICIENCY, UNSPECIFIED: ICD-10-CM

## 2021-08-23 ENCOUNTER — TRANSCRIPTION ENCOUNTER (OUTPATIENT)
Age: 36
End: 2021-08-23

## 2021-08-23 LAB
25(OH)D3 SERPL-MCNC: 47.5 NG/ML
ALBUMIN SERPL ELPH-MCNC: 4.8 G/DL
ALP BLD-CCNC: 87 U/L
ALT SERPL-CCNC: 18 U/L
ANION GAP SERPL CALC-SCNC: 11 MMOL/L
AST SERPL-CCNC: 15 U/L
BASOPHILS # BLD AUTO: 0.02 K/UL
BASOPHILS NFR BLD AUTO: 0.4 %
BILIRUB DIRECT SERPL-MCNC: 0.1 MG/DL
BILIRUB INDIRECT SERPL-MCNC: 0.2 MG/DL
BILIRUB SERPL-MCNC: 0.2 MG/DL
BUN SERPL-MCNC: 13 MG/DL
CALCIUM SERPL-MCNC: 9.6 MG/DL
CARBAMAZEPINE SERPL-MCNC: 10.6 UG/ML
CHLORIDE SERPL-SCNC: 101 MMOL/L
CO2 SERPL-SCNC: 28 MMOL/L
CREAT SERPL-MCNC: 0.79 MG/DL
EOSINOPHIL # BLD AUTO: 0.05 K/UL
EOSINOPHIL NFR BLD AUTO: 0.9 %
GLUCOSE SERPL-MCNC: 104 MG/DL
HCT VFR BLD CALC: 46.9 %
HGB BLD-MCNC: 15.2 G/DL
IMM GRANULOCYTES NFR BLD AUTO: 0.4 %
LYMPHOCYTES # BLD AUTO: 1.25 K/UL
LYMPHOCYTES NFR BLD AUTO: 21.9 %
MAN DIFF?: NORMAL
MCHC RBC-ENTMCNC: 28.7 PG
MCHC RBC-ENTMCNC: 32.4 GM/DL
MCV RBC AUTO: 88.5 FL
MONOCYTES # BLD AUTO: 0.65 K/UL
MONOCYTES NFR BLD AUTO: 11.4 %
NEUTROPHILS # BLD AUTO: 3.71 K/UL
NEUTROPHILS NFR BLD AUTO: 65 %
PLATELET # BLD AUTO: 252 K/UL
POTASSIUM SERPL-SCNC: 4.4 MMOL/L
PROT SERPL-MCNC: 7.1 G/DL
RBC # BLD: 5.3 M/UL
RBC # FLD: 12.1 %
SODIUM SERPL-SCNC: 140 MMOL/L
WBC # FLD AUTO: 5.7 K/UL

## 2021-09-01 ENCOUNTER — TRANSCRIPTION ENCOUNTER (OUTPATIENT)
Age: 36
End: 2021-09-01

## 2021-09-10 ENCOUNTER — APPOINTMENT (OUTPATIENT)
Dept: ELECTROPHYSIOLOGY | Facility: CLINIC | Age: 36
End: 2021-09-10

## 2021-09-15 ENCOUNTER — APPOINTMENT (OUTPATIENT)
Dept: ELECTROPHYSIOLOGY | Facility: CLINIC | Age: 36
End: 2021-09-15
Payer: MEDICARE

## 2021-09-15 VITALS — HEART RATE: 75 BPM | RESPIRATION RATE: 14 BRPM | SYSTOLIC BLOOD PRESSURE: 115 MMHG | DIASTOLIC BLOOD PRESSURE: 70 MMHG

## 2021-09-15 PROCEDURE — 93280 PM DEVICE PROGR EVAL DUAL: CPT

## 2021-10-25 ENCOUNTER — TRANSCRIPTION ENCOUNTER (OUTPATIENT)
Age: 36
End: 2021-10-25

## 2021-10-27 ENCOUNTER — APPOINTMENT (OUTPATIENT)
Dept: NEUROLOGY | Facility: CLINIC | Age: 36
End: 2021-10-27
Payer: MEDICARE

## 2021-10-27 VITALS
HEART RATE: 78 BPM | SYSTOLIC BLOOD PRESSURE: 118 MMHG | BODY MASS INDEX: 21.85 KG/M2 | HEIGHT: 64.17 IN | WEIGHT: 128 LBS | DIASTOLIC BLOOD PRESSURE: 76 MMHG

## 2021-10-27 PROCEDURE — 99213 OFFICE O/P EST LOW 20 MIN: CPT

## 2021-10-27 NOTE — HISTORY OF PRESENT ILLNESS
[FreeTextEntry1] : Taking Artane 5mg bid (max 15 tid in past), Tegretol brand 200/400mg, asa 81/d, Vt D3 2000mcg\par AEDs PHT and PB  in infancy only.\par \par Updates:\par No seizures.  Had pulm heart valve replacement 2020.  on ASA 81/d\par Some exercise.  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 His history is significant for CHB requiring pacemaker placement. history is significant for surgery of heart at age 1weeks req 6 weeks on ventilator.  a CVA and cardiac arrest  as an infant.\par Seizures since infancy thereafter on PB, then CBZ.   GTCS, full body convulsions, lasting 2-5min.\par No recent breakthrough seizures since about age 16-17\par \par Episode LOC 2005.  PPM since 2005 \par \par Right hand dystonia, paresis, cerebral palsy. Was evaluated for botox for RUE, but did not continue.\par Achilles surgery in past. \par scoliosis surgery.\par Born FT, CC, Apgars 10 @1 and 5. \par \par Likes to watch hockey, dependent on family, bocce ball, movies.\par

## 2021-10-27 NOTE — CONSULT LETTER
[Dear  ___] : Dear  [unfilled], [Consult Letter:] : I had the pleasure of evaluating your patient, [unfilled]. [( Thank you for referring [unfilled] for consultation for _____ )] : Thank you for referring [unfilled] for consultation for [unfilled] [Please see my note below.] : Please see my note below. [Consult Closing:] : Thank you very much for allowing me to participate in the care of this patient.  If you have any questions, please do not hesitate to contact me. [Sincerely,] : Sincerely, [FreeTextEntry2] : Trev Gillespie MD.\par Address: [address] [place] [address] [address], [place] [address] [FreeTextEntry3] : Mo Nixon MD, WINDY\par Board Certified: Neurology, Clinical Neurophysiology, Epilepsy\par , Department of Neurology\par Epilepsy Fellowship \par [facility]\par \par

## 2021-10-27 NOTE — ASSESSMENT
[FreeTextEntry1] : CP, seizures, well controlled x many years\par syncope PPM, cardiac surgeries in past\par Dystonia- late onset RUE.\par Had avoided botox in past, had 3 inj prior, no effect.  Worried about systemic effects.\par Vit D, Ca for CBZ risk of bone loss.  Bone density test, high risk due to inactivity, epilepsy, fair skin, chronic CBZ use.\par Consider alt AED with lower risk of osteoporosis in future.\par ie LEV, LAC, APT/OXC\par f/u bone density test 2022, on vit D for osteopenia Z score 1.1\par f/u 1yr\par \par

## 2021-10-27 NOTE — DATA REVIEWED
[de-identified] : 2003 MRI "NL" @[facility] [de-identified] : [facility] 2019 mild slowing [de-identified] : Labs: 8/20 cbc, bmp, lft CBZ 9.7

## 2021-11-03 ENCOUNTER — APPOINTMENT (OUTPATIENT)
Dept: PEDIATRIC CARDIOLOGY | Facility: CLINIC | Age: 36
End: 2021-11-03
Payer: MEDICARE

## 2021-11-03 VITALS
BODY MASS INDEX: 21.91 KG/M2 | SYSTOLIC BLOOD PRESSURE: 130 MMHG | HEIGHT: 64.17 IN | OXYGEN SATURATION: 98 % | WEIGHT: 128.31 LBS | HEART RATE: 71 BPM | DIASTOLIC BLOOD PRESSURE: 68 MMHG

## 2021-11-03 DIAGNOSIS — Q24.4 CONGENITAL SUBAORTIC STENOSIS: ICD-10-CM

## 2021-11-03 PROCEDURE — 93303 ECHO TRANSTHORACIC: CPT

## 2021-11-03 PROCEDURE — 93000 ELECTROCARDIOGRAM COMPLETE: CPT

## 2021-11-03 PROCEDURE — 99215 OFFICE O/P EST HI 40 MIN: CPT

## 2021-11-03 PROCEDURE — 93320 DOPPLER ECHO COMPLETE: CPT

## 2021-11-03 PROCEDURE — 93325 DOPPLER ECHO COLOR FLOW MAPG: CPT

## 2021-11-03 NOTE — REASON FOR VISIT
[Follow-Up] : a follow-up visit for [S/P Cardiac Surgery] : status post cardiac surgery [Coarctation Of The Aorta] : coarctation of the aorta [Ventricular Septal Defect] : a ventricular septal defect [Mother] : mother

## 2021-11-09 NOTE — DISCUSSION/SUMMARY
[Needs SBE Prophylaxis] : [unfilled]  needs bacterial endocarditis prophylaxis. SBE prophylaxis is indicated for dental and invasive ENT procedures. (Circulation. 2007; 116: 5958-2819) [FreeTextEntry1] : In summary, Denis is a 36 year old male with a complex cardiac history described above. He underwent pulmonary valve replacement with a 27-mm Inspiris Resilia aortic valve in 2019. He has done well since that time with a noted increased endurance by his parents. \par \par We planned on repeat imaging to evaluate the degree of remodeling of his right ventricle, his pulmonary arteries,  his arch and descending aorta where we continue to follow a gradient ~28-30mmHg. His parents were hesitant last year with COVID. We will plan on obtaining that study this upcoming year along with a head MRI to evaluate his intracranial anatomy in the setting of previous repair of his coarctation of the aorta. The incidence of intracranial abnormalities in patients with coarctation of the aorta may be as high as 8-10% so a one time screening is indicated in these patients. We will need to bring his heart rate down with his pacemaker and this was discussed with his mother. We also discussed the possible need for sedation. Since this test will be for both head and heart it might be difficult for Denis to lay still for a prolonged period of time. They will call with a timeline of when they would want to pursue this as well as the possibility of sedation.\par \par We again reviewed the importance of meticulous dental hygiene and the need for regular dental cleanings with lifelong SBE prophylaxis required for all dental procedures. We will continue to follow him annually or sooner if clinically indicated\par

## 2021-11-09 NOTE — CONSULT LETTER
[Today's Date] : [unfilled] [Name] : Name: [unfilled] [] : : ~~ [Today's Date:] : [unfilled] [Dear  ___:] : Dear Dr. [unfilled]: [Consult] : I had the pleasure of evaluating your patient, [unfilled]. My full evaluation follows. [Consult - Multiple Provider] : Thank you very much for allowing us to participate in the care of this patient. If you have any questions, please do not hesitate to contact us. [Sincerely,] : Sincerely, [FreeTextEntry4] : Trev Gillespie MD [FreeTextEntry5] : [address] [FreeTextEntry6] : [address] [FreeTextEntry7] : [place] [address] [FreeTextEntry8] : [phone] [de-identified] : Heather Mariano, MSN, CPNP-AC, PC\par Pediatric Cardiology, Adult Congenital Cardiology\par [facility]\par [facility]\par \par Pete De Luna MD, JANET\par Medical Director, Adult Congenital Heart Program\par Professor of Pediatrics\par [employer] [facility] [employer] [employer] at [facility]\par

## 2021-11-09 NOTE — REVIEW OF SYSTEMS
[Feeling Poorly] : not feeling poorly (malaise) [Fever] : no fever [Cyanosis] : no cyanosis [Chest Pain] : no chest pain or discomfort [Exercise Intolerance] : no persistence of exercise intolerance [Palpitations] : no palpitations [Orthopnea] : no orthopnea [Fast HR] : no tachycardia [Cough] : no cough [Shortness Of Breath] : not expressed as feeling short of breath [Decrease In Appetite] : appetite not decreased [Fainting (Syncope)] : no fainting [Headache] : no headache [Dizziness] : no dizziness [Easy Bruising] : no tendency for easy bruising [Easy Bleeding] : no ~M tendency for easy bleeding [Nosebleeds] : no epistaxis [Sleep Disturbances] : ~T no sleep disturbances [Depression] : no depression [Anxiety] : no anxiety [Heat/Cold Intolerance] : no temperature intolerance

## 2021-11-09 NOTE — CARDIOLOGY SUMMARY
[Today's Date] : [unfilled] [FreeTextEntry1] : atrial sensing, ventricular pacing with 100% pacing [FreeTextEntry2] : See attached echo report

## 2021-11-09 NOTE — PHYSICAL EXAM
[General Appearance - Alert] : alert [Facies] : the head and face were normal in appearance [Sclera] : the conjunctiva were normal [Nasal Cavity] : the nasal mucosa was normal [Auscultation Breath Sounds / Voice Sounds] : breath sounds clear to auscultation bilaterally [No Cough] : no cough [Well-Healed] : well-healed [Apical Impulse] : quiet precordium with normal apical impulse [Heart Rate And Rhythm] : normal heart rate and rhythm [Normal S1] : normal  [S2 Single] : was single [0] : right 0 [1+] : left 1+ [2+] : left 2+ [Systolic] : systolic [LUSB] : LUSB [Ejection] : ejection [] : no hepato-splenomegaly [Nail Clubbing] : no clubbing  or cyanosis of the fingers [Skin Color & Pigmentation] : normal skin color and pigmentation [Demonstrated Behavior - Infant Nonreactive To Parents] : interactive [Sternotomy] : sternotomy [Hypertrophic] : hypertrophic [III] : a grade 3/6   [Diastolic] : diastolic [I] : a grade 1/4  [LLSB] : LLSB  [FreeTextEntry2] : hypertrophic at lower pole of sternum [FreeTextEntry1] : II/VI raspy murmur LLSB, slight delay between carotid and abdominal aorta

## 2021-11-09 NOTE — HISTORY OF PRESENT ILLNESS
[FreeTextEntry1] : Denis was seen today for evaluation in the Adult Congenital Heart Program of [facility]. 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 His history is significant for CHB requiring pacemaker placement. In addition, Denis's past history is significant for a CVA and cardiac arrest prior to his initial PA band as an infant. A summary of his surgical/cath procedures is outlined below:\par \par 1. Repair of coarctation of aorta via subclavian flap technique and pulmonary artery banding, Dr. Alanis - [facility]\par 2. ( 2/21/1984), patch closure of perimembranous VSD, debanding of pulmonary artery, pericardial arterioplasty of the pulmonary artery, subaortic exploration with resection of redundant tricuspid insertions below the right coronary cusp, Dr. Alanis\par 3.(1988), Resection of recurrent subaortic membrane, Dr. Alanis\par 4. (8/20/1997), Balloon angioplasty of recurrent coarctation, Dr. Huerta\par 5. (2/2/2005), Insertion of dual chamber pacemaker for CHB, Dr. Garcia\par 6. (3/4/2005), Removal of dual chamber pacemaker secondary to pacemaker pocket infection,(MSSA, s/p 6 weeks antibiotic therapy) Dr. Garcia\par 7. (3/7/2005), Replacement of dual chamber pacemaker, Dr. Garcia\par 8. (2/27/2013), Pacemaker generator change, Dr. Garcia\par 9. (8/5/2019), Pulmonary valve replacement with 27-mm Inspiris Resilia aortic valve, Dr. Crowley\par \par His parents have kept him home from his program during COVID . They take him out for exercise to walk though not as regularly as before. He does not have to stop to rest when walking. From a cardiovascular perspective he does not appear to have any limitations. He does not get easily winded, fatigued, complain of chest discomfort or dizziness. There have been no syncopal events. \par \par Denis continues on Aspirin for his valve without any bleeding or bruising.\par \par His pacemaker is DDD with a lower rate limit of 50 and upper tracking of 150 bpm. He is mostly atrial sensing and v-pacing. His is followed by Dr. Garcia.\par

## 2021-11-12 ENCOUNTER — RX CHANGE (OUTPATIENT)
Age: 36
End: 2021-11-12

## 2021-12-16 ENCOUNTER — NON-APPOINTMENT (OUTPATIENT)
Age: 36
End: 2021-12-16

## 2021-12-16 ENCOUNTER — APPOINTMENT (OUTPATIENT)
Dept: ELECTROPHYSIOLOGY | Facility: CLINIC | Age: 36
End: 2021-12-16
Payer: MEDICARE

## 2021-12-16 PROCEDURE — 93294 REM INTERROG EVL PM/LDLS PM: CPT

## 2021-12-16 PROCEDURE — 93296 REM INTERROG EVL PM/IDS: CPT | Mod: NC

## 2022-02-24 ENCOUNTER — TRANSCRIPTION ENCOUNTER (OUTPATIENT)
Age: 37
End: 2022-02-24

## 2022-02-25 ENCOUNTER — TRANSCRIPTION ENCOUNTER (OUTPATIENT)
Age: 37
End: 2022-02-25

## 2022-03-17 ENCOUNTER — NON-APPOINTMENT (OUTPATIENT)
Age: 37
End: 2022-03-17

## 2022-03-17 ENCOUNTER — APPOINTMENT (OUTPATIENT)
Dept: ELECTROPHYSIOLOGY | Facility: CLINIC | Age: 37
End: 2022-03-17
Payer: MEDICARE

## 2022-03-17 PROCEDURE — 93296 REM INTERROG EVL PM/IDS: CPT

## 2022-03-17 PROCEDURE — 93294 REM INTERROG EVL PM/LDLS PM: CPT

## 2022-06-16 ENCOUNTER — APPOINTMENT (OUTPATIENT)
Dept: ELECTROPHYSIOLOGY | Facility: CLINIC | Age: 37
End: 2022-06-16
Payer: MEDICARE

## 2022-06-16 ENCOUNTER — NON-APPOINTMENT (OUTPATIENT)
Age: 37
End: 2022-06-16

## 2022-06-16 PROCEDURE — 93296 REM INTERROG EVL PM/IDS: CPT

## 2022-06-16 PROCEDURE — 93294 REM INTERROG EVL PM/LDLS PM: CPT

## 2022-08-24 LAB — CARBAMAZEPINE SERPL-MCNC: 10.5 UG/ML

## 2022-09-16 ENCOUNTER — APPOINTMENT (OUTPATIENT)
Dept: ELECTROPHYSIOLOGY | Facility: CLINIC | Age: 37
End: 2022-09-16

## 2022-09-16 PROCEDURE — 93280 PM DEVICE PROGR EVAL DUAL: CPT

## 2022-09-27 NOTE — H&P PST PEDIATRIC - TRANSFUSION PREMEDICATION REQUIRED
[Follow-Up Visit] : a follow-up visit for [FreeTextEntry2] : her bilateral knees acetaminophen/diphenhydramine

## 2022-10-25 NOTE — BRIEF OPERATIVE NOTE - OPERATION/FINDINGS
Dx  PVI  Sx  Resternotomy for PVR on CPB  27 mm bovine pericardial valve placed in the pulmonary position.  CPB 40 min
Left lower lobe pneumonia

## 2022-10-28 ENCOUNTER — APPOINTMENT (OUTPATIENT)
Dept: NEUROLOGY | Facility: CLINIC | Age: 37
End: 2022-10-28

## 2022-11-02 ENCOUNTER — APPOINTMENT (OUTPATIENT)
Dept: PEDIATRIC CARDIOLOGY | Facility: CLINIC | Age: 37
End: 2022-11-02

## 2022-11-02 VITALS
SYSTOLIC BLOOD PRESSURE: 127 MMHG | BODY MASS INDEX: 21.83 KG/M2 | HEIGHT: 64.17 IN | HEART RATE: 71 BPM | OXYGEN SATURATION: 97 % | DIASTOLIC BLOOD PRESSURE: 66 MMHG | WEIGHT: 127.87 LBS

## 2022-11-02 PROCEDURE — 93000 ELECTROCARDIOGRAM COMPLETE: CPT

## 2022-11-02 PROCEDURE — 93303 ECHO TRANSTHORACIC: CPT

## 2022-11-02 PROCEDURE — 93320 DOPPLER ECHO COMPLETE: CPT

## 2022-11-02 PROCEDURE — 99215 OFFICE O/P EST HI 40 MIN: CPT | Mod: 25

## 2022-11-02 PROCEDURE — 93325 DOPPLER ECHO COLOR FLOW MAPG: CPT

## 2022-11-02 NOTE — PHYSICAL EXAM
[General Appearance - Alert] : alert [Facies] : the head and face were normal in appearance [Sclera] : the sclera were normal [Auscultation Breath Sounds / Voice Sounds] : breath sounds clear to auscultation bilaterally [No Cough] : no cough [Sternotomy] : sternotomy [Well-Healed] : well-healed [Apical Impulse] : quiet precordium with normal apical impulse [Heart Rate And Rhythm] : normal heart rate and rhythm [Edema] : no edema [Normal S1] : normal  [S2 Single] : was single [0] : right 0 [1+] : left 1+ [2+] : left 2+ [Systolic] : systolic [III] : a grade 3/6   [Abdomen Soft] : soft [] : no hepato-splenomegaly [Nail Clubbing] : no clubbing  or cyanosis of the fingers [Skin Color & Pigmentation] : normal skin color and pigmentation [Demonstrated Behavior - Infant Nonreactive To Parents] : interactive [Ejection] : ejection [Harsh] : harsh [Back] : the murmur was transmitted to the back [Diastolic] : diastolic [I] : a grade 1/4  [LUSB] : LUSB [Cervical Lymph Nodes Enlarged Anterior] : The anterior cervical nodes were normal [Cervical Lymph Nodes Enlarged Posterior] : The posterior cervical nodes were normal

## 2022-11-02 NOTE — REASON FOR VISIT
[Follow-Up] : a follow-up visit for [S/P Cardiac Surgery] : status post cardiac surgery [S/P Catheterization] : status post catheterization [Coarctation Of The Aorta] : coarctation of the aorta [Ventricular Septal Defect] : a ventricular septal defect [Mother] : mother

## 2022-11-07 ENCOUNTER — RX RENEWAL (OUTPATIENT)
Age: 37
End: 2022-11-07

## 2022-11-17 NOTE — CONSULT LETTER
[Today's Date] : [unfilled] [Name] : Name: [unfilled] [] : : ~~ [Today's Date:] : [unfilled] [Dear  ___:] : Dear Dr. [unfilled]: [Consult] : I had the pleasure of evaluating your patient, [unfilled]. My full evaluation follows. [Sincerely,] : Sincerely, [Consult - Multiple Provider] : Thank you very much for allowing us to participate in the care of this patient. If you have any questions, please do not hesitate to contact us. [FreeTextEntry4] : Trev Gillespie MD [FreeTextEntry5] : [address] [facility] [FreeTextEntry7] : [place] [address] [FreeTextEntry8] : [phone] [de-identified] : Heather Mariano, MSN, CPNP-AC, PC\par Pediatric Cardiology, Adult Congenital Cardiology\par [facility]\par [facility]\par \par Pete De Luna MD, JANET\par Medical Director, Adult Congenital Heart Program\par Professor of Pediatrics\par [employer] [facility] [employer] at [facility]\par

## 2022-11-17 NOTE — REVIEW OF SYSTEMS
[Cyanosis] : no cyanosis [Exercise Intolerance] : no persistence of exercise intolerance [Palpitations] : no palpitations [Cough] : no cough [Shortness Of Breath] : not expressed as feeling short of breath [Fainting (Syncope)] : no fainting [Headache] : no headache [Dizziness] : no dizziness [Easy Bruising] : no tendency for easy bruising [Easy Bleeding] : no ~M tendency for easy bleeding

## 2022-11-17 NOTE — DISCUSSION/SUMMARY
[Needs SBE Prophylaxis] : [unfilled]  needs bacterial endocarditis prophylaxis. SBE prophylaxis is indicated for dental and invasive ENT procedures. (Circulation. 2007; 116: 4621-8852) [FreeTextEntry1] : In summary, Denis is a 36 year old male with a complex cardiac history described above. He underwent pulmonary valve replacement with a 27-mm Inspiris Resilia aortic valve in 2019.\par \par Last year we discussed a repeat CT scan to evaluate the degree of remodeling of his right ventricle, his pulmonary arteries his arch and his descending aorta which continues to have a gradient in the area of the previous coarctation. Additionally we discussed a head MRI to evaluate his intracranial anatomy in the setting of previous repair of his coarctation repair. We discussed with his parents that his pacemaker will need to be adjusted at that time to lower his heart rate. His parents have previously said that he will require sedation for this procedure.\par \par We reviewed the importance of meticulous dental hygiene and the need for regular dental cleanings with lifelong SBE prophylaxis required for all dental procedures. We will continue to follow him annually. We will call  to review all test results as they become available.\par \par \par \par

## 2022-11-17 NOTE — CARDIOLOGY SUMMARY
[Today's Date] : [unfilled] [FreeTextEntry1] : atrial sensing, ventricular pacing with 100% capture [FreeTextEntry2] : Summary:\par 1. 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 Pacing catheter traversing tricuspid valve into right ventricular cavity.\par 5. No residual ventricular septal defect.\par 6. Diffuse mild narrowing of the LVOT with a small, near circumferential subaortic membrane about 2cm below the aortic valve annulus (clip #29, frame #31). There is mild acceleration of flow in the LVOT, to 2.3 m/sec.\par 7. Mild aortic valve regurgitation.\par 8. Mildly dilated aortic root and ascending aorta.\par 9. The aortic isthmus was inadequately visualized but does appear to taper, with acceleration of color flow in the region of the coarctation repair (peak and mean gradients of 32mmHg and 17mmHg, respectively).\par 10. Low amplitude descending aortic Doppler tracing with antegrade diastolic continuation of flow.\par 11. Mild neopulmonary stenosis\par 12. Peak pulmonary valve gradient = 34.3 mmHg (mean grad = 19.0 mmHg).\par 13. Moderate neopulmonary regurgitation.\par 14. Mild tricuspid valve regurgitation, peak systolic instantaneous gradient 49.3 mmHg.\par 15. Mildly dilated right ventricle.\par 16. Qualitatively normal right ventricular systolic function.\par 17. Normal left ventricular size, morphology and systolic function.\par 18. No pericardial effusion\par

## 2022-11-17 NOTE — HISTORY OF PRESENT ILLNESS
[FreeTextEntry1] : Denis was seen today for evaluation in the Adult Congenital Heart Program of [facility]. 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 His history is significant for CHB requiring pacemaker placement. In addition, Denis's past history is significant for a CVA and cardiac arrest prior to his initial PA band as an infant. A summary of his surgical/cath procedures is outlined below:\par \par 1. Repair of coarctation of aorta via subclavian flap technique and pulmonary artery banding, Dr. Alanis - [facility]\par 2. ( 2/21/1984), patch closure of perimembranous VSD, debanding of pulmonary artery, pericardial arterioplasty of the pulmonary artery, subaortic exploration with resection of redundant tricuspid insertions below the right coronary cusp, Dr. Alanis\par 3.(1988), Resection of recurrent subaortic membrane, Dr. Alanis\par 4. (8/20/1997), Balloon angioplasty of recurrent coarctation, Dr. Huerta\par 5. (2/2/2005), Insertion of dual chamber pacemaker for CHB, Dr. Garcia\par 6. (3/4/2005), Removal of dual chamber pacemaker secondary to pacemaker pocket infection,(MSSA, s/p 6 weeks antibiotic therapy) Dr. Garcia\par 7. (3/7/2005), Replacement of dual chamber pacemaker, Dr. Garcia\par 8. (2/27/2013), Pacemaker generator change, Dr. Garcia\par 9. (8/5/2019), Pulmonary valve replacement with 27-mm Inspiris Resilia aortic valve, Dr. Crowley\par \par He still has not returned to program but keeps busy at home. His parents think they might send him back after the New Year.  He walks and does not fatigue easily. From a cardiovascular perspective, he does not appear to have any limitations with activity.  He does not appear to appear to have any chest discomfort. he has never had syncope. Denis has no bleeding or bruising on Aspirin.\par \par His pacemaker is DDD with a lower rate limit of 50 and upper tracking of 150 bpm. He is mostly atrial sensing and v-pacing. His is followed by Dr. Garcia.

## 2022-12-14 ENCOUNTER — APPOINTMENT (OUTPATIENT)
Dept: NEUROLOGY | Facility: CLINIC | Age: 37
End: 2022-12-14

## 2022-12-14 VITALS
HEART RATE: 82 BPM | SYSTOLIC BLOOD PRESSURE: 139 MMHG | HEIGHT: 64 IN | WEIGHT: 127 LBS | BODY MASS INDEX: 21.68 KG/M2 | DIASTOLIC BLOOD PRESSURE: 76 MMHG

## 2022-12-14 PROCEDURE — 99213 OFFICE O/P EST LOW 20 MIN: CPT

## 2022-12-14 NOTE — ASSESSMENT
[FreeTextEntry1] : CP, seizures, well controlled x many years\par syncope s/p PPM / revision, cardiac surgeries in past\par Dystonia- late onset RUE.\par Had avoided botox in past, had 3 inj prior, no effect.  Worried about systemic effects.\par \par Vit D, Ca for CBZ risk of bone loss.  Bone density test, high risk due to inactivity, epilepsy, fair skin, chronic CBZ use.\par Consider alt AED with lower risk of osteoporosis in future. ie LEV, LAC, APT/OXC\par f/u bone density test 2022, on vit D for osteopenia Z score 1.1\par ASA 81mg/d\par \par f/u 1yr\par \par

## 2022-12-14 NOTE — REVIEW OF SYSTEMS
[As Noted in HPI] : as noted in HPI [As noted in HPI] : as noted in HPI [SOB on Exertion] : no shortness of breath during exertion [Orthopnea] : no orthopnea [Negative] : Heme/Lymph

## 2022-12-14 NOTE — CONSULT LETTER
[Dear  ___] : Dear  [unfilled], [Consult Letter:] : I had the pleasure of evaluating your patient, [unfilled]. [( Thank you for referring [unfilled] for consultation for _____ )] : Thank you for referring [unfilled] for consultation for [unfilled] [Please see my note below.] : Please see my note below. [Consult Closing:] : Thank you very much for allowing me to participate in the care of this patient.  If you have any questions, please do not hesitate to contact me. [Sincerely,] : Sincerely, [FreeTextEntry2] : Trev Gillespie MD.\par Address: [address] [place] [address] [address], [place] [address] [FreeTextEntry3] : Mo Nixon MD, WINDY\par Board Certified: Neurology, Clinical Neurophysiology, Epilepsy\par , Department of Neurology\par Epilepsy Fellowship \par [facility]\par \par

## 2022-12-14 NOTE — HISTORY OF PRESENT ILLNESS
[FreeTextEntry1] : Taking Artane 5mg bid (max 15 tid in past), Tegretol brand 200/400mg, asa 81/d, Vt D3 2000mcg\par AEDs PHT and PB  in infancy only.\par \par Updates:  \par No seizures.  Had pulm heart valve replacement 2020.  on ASA 81/d\par Some exercise.  mom feels he looks good.\par some boredom, no [facility] day program since about 2019.  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 His history is significant for CHB requiring pacemaker placement. history is significant for surgery of heart at age 1weeks req 6 weeks on ventilator.  a CVA and cardiac arrest  as an infant.\par Seizures since infancy thereafter on PB, then CBZ.   GTCS, full body convulsions, lasting 2-5min.\par No recent breakthrough seizures since about age 16-17\par \par Episode LOC 2005.  PPM since 2005 \par \par Right hand dystonia, paresis, cerebral palsy. Was evaluated for botox for RUE, but did not continue.\par Achilles surgery in past. \par scoliosis surgery.\par Born FT, CC, Apgars 10 @1 and 5. \par \par Likes to watch hockey, dependent on family, bocce ball, movies.\par

## 2022-12-14 NOTE — DATA REVIEWED
[de-identified] : 2003 MRI "NL" @[facility] [de-identified] : [facility] 2019 mild slowing [de-identified] : Labs: 8/20 cbc, bmp, lft CBZ 9.7

## 2022-12-14 NOTE — PHYSICAL EXAM
[General Appearance - Well Nourished] : well nourished [General Appearance - Well-Appearing] : healthy appearing [Affect] : the affect was normal [Mood] : the mood was normal [Repeating Phrases] : no difficulty repeating a phrase [Fluency] : fluency not intact [Comprehension] : comprehension intact [Reading] : difficulty reading [Past History] : inadequate knowledge of personal past history [Cranial Nerves Oculomotor (III)] : extraocular motion intact [Cranial Nerves Trigeminal (V)] : facial sensation intact symmetrically [Cranial Nerves Facial (VII)] : face symmetrical [Cranial Nerves Vestibulocochlear (VIII)] : hearing was intact bilaterally [Cranial Nerves Glossopharyngeal (IX)] : tongue and palate midline [Cranial Nerves Accessory (XI - Cranial And Spinal)] : head turning and shoulder shrug symmetric [Cranial Nerves Hypoglossal (XII)] : there was no tongue deviation with protrusion [Motor Handedness Right-Handed] : the patient is right hand dominant [Motor Strength Upper Extremities Right] : there was weakness of the right upper extremity [Motor Strength Upper Extremities Left] : there was weakness of the left upper extremity [Sensation Tactile Decrease] : light touch was intact [Past-pointing] : there was no past-pointing [FreeTextEntry6] : right inc tone, dystonia of wrist, arm.  RLE circumducts, toe drag with walking.  [FreeTextEntry8] : RUE motor control, clumsy [Sclera] : the sclera and conjunctiva were normal [Outer Ear] : the ears and nose were normal in appearance [Neck Appearance] : the appearance of the neck was normal [Heart Rate And Rhythm] : heart rate was normal and rhythm regular [FreeTextEntry1] : murmur noted [Abdomen Tenderness] : non-tender [Nail Clubbing] : no clubbing  or cyanosis of the fingernails [] : no rash [Skin Lesions] : no skin lesions

## 2022-12-16 ENCOUNTER — NON-APPOINTMENT (OUTPATIENT)
Age: 37
End: 2022-12-16

## 2022-12-16 ENCOUNTER — APPOINTMENT (OUTPATIENT)
Dept: ELECTROPHYSIOLOGY | Facility: CLINIC | Age: 37
End: 2022-12-16

## 2022-12-16 PROCEDURE — 93296 REM INTERROG EVL PM/IDS: CPT

## 2022-12-16 PROCEDURE — 93294 REM INTERROG EVL PM/LDLS PM: CPT

## 2023-02-20 LAB
ALBUMIN SERPL ELPH-MCNC: 4.6 G/DL
ALP BLD-CCNC: 93 U/L
ALT SERPL-CCNC: 20 U/L
ANION GAP SERPL CALC-SCNC: 12 MMOL/L
AST SERPL-CCNC: 19 U/L
BASOPHILS # BLD AUTO: 0.03 K/UL
BASOPHILS NFR BLD AUTO: 0.5 %
BILIRUB DIRECT SERPL-MCNC: 0.1 MG/DL
BILIRUB INDIRECT SERPL-MCNC: 0.2 MG/DL
BILIRUB SERPL-MCNC: 0.3 MG/DL
BUN SERPL-MCNC: 12 MG/DL
CALCIUM SERPL-MCNC: 9.6 MG/DL
CARBAMAZEPINE SERPL-MCNC: 8.2 UG/ML
CHLORIDE SERPL-SCNC: 102 MMOL/L
CO2 SERPL-SCNC: 28 MMOL/L
CREAT SERPL-MCNC: 0.8 MG/DL
EGFR: 115 ML/MIN/1.73M2
EOSINOPHIL # BLD AUTO: 0.07 K/UL
EOSINOPHIL NFR BLD AUTO: 1.1 %
GLUCOSE SERPL-MCNC: 79 MG/DL
HCT VFR BLD CALC: 44.2 %
HGB BLD-MCNC: 14.5 G/DL
IMM GRANULOCYTES NFR BLD AUTO: 0.3 %
LYMPHOCYTES # BLD AUTO: 1.23 K/UL
LYMPHOCYTES NFR BLD AUTO: 20.1 %
MAN DIFF?: NORMAL
MCHC RBC-ENTMCNC: 28.5 PG
MCHC RBC-ENTMCNC: 32.8 GM/DL
MCV RBC AUTO: 86.8 FL
MONOCYTES # BLD AUTO: 0.76 K/UL
MONOCYTES NFR BLD AUTO: 12.4 %
NEUTROPHILS # BLD AUTO: 4 K/UL
NEUTROPHILS NFR BLD AUTO: 65.6 %
PLATELET # BLD AUTO: 190 K/UL
POTASSIUM SERPL-SCNC: 3.9 MMOL/L
PROT SERPL-MCNC: 6.7 G/DL
RBC # BLD: 5.09 M/UL
RBC # FLD: 11.9 %
SODIUM SERPL-SCNC: 141 MMOL/L
WBC # FLD AUTO: 6.11 K/UL

## 2023-03-03 ENCOUNTER — TRANSCRIPTION ENCOUNTER (OUTPATIENT)
Age: 38
End: 2023-03-03

## 2023-03-17 ENCOUNTER — NON-APPOINTMENT (OUTPATIENT)
Age: 38
End: 2023-03-17

## 2023-03-17 ENCOUNTER — APPOINTMENT (OUTPATIENT)
Dept: ELECTROPHYSIOLOGY | Facility: CLINIC | Age: 38
End: 2023-03-17
Payer: MEDICARE

## 2023-03-17 PROCEDURE — 93294 REM INTERROG EVL PM/LDLS PM: CPT

## 2023-03-17 PROCEDURE — 93296 REM INTERROG EVL PM/IDS: CPT

## 2023-03-23 LAB
ANION GAP SERPL CALC-SCNC: 11 MMOL/L
BUN SERPL-MCNC: 12 MG/DL
CALCIUM SERPL-MCNC: 9.5 MG/DL
CHLORIDE SERPL-SCNC: 102 MMOL/L
CO2 SERPL-SCNC: 28 MMOL/L
CREAT SERPL-MCNC: 0.7 MG/DL
GLUCOSE SERPL-MCNC: 78 MG/DL
POTASSIUM SERPL-SCNC: 4.4 MMOL/L
SODIUM SERPL-SCNC: 141 MMOL/L

## 2023-05-18 ENCOUNTER — FORM ENCOUNTER (OUTPATIENT)
Age: 38
End: 2023-05-18

## 2023-05-31 ENCOUNTER — OUTPATIENT (OUTPATIENT)
Dept: OUTPATIENT SERVICES | Facility: HOSPITAL | Age: 38
LOS: 1 days | End: 2023-05-31

## 2023-05-31 VITALS
OXYGEN SATURATION: 98 % | TEMPERATURE: 98 F | SYSTOLIC BLOOD PRESSURE: 131 MMHG | WEIGHT: 132.06 LBS | RESPIRATION RATE: 16 BRPM | HEIGHT: 63 IN | HEART RATE: 68 BPM | DIASTOLIC BLOOD PRESSURE: 78 MMHG

## 2023-05-31 DIAGNOSIS — Q21.0 VENTRICULAR SEPTAL DEFECT: ICD-10-CM

## 2023-05-31 DIAGNOSIS — Z95.0 PRESENCE OF CARDIAC PACEMAKER: ICD-10-CM

## 2023-05-31 DIAGNOSIS — Q25.1 COARCTATION OF AORTA: ICD-10-CM

## 2023-05-31 DIAGNOSIS — Z98.890 OTHER SPECIFIED POSTPROCEDURAL STATES: Chronic | ICD-10-CM

## 2023-05-31 DIAGNOSIS — Z95.2 PRESENCE OF PROSTHETIC HEART VALVE: ICD-10-CM

## 2023-05-31 DIAGNOSIS — Q24.4 CONGENITAL SUBAORTIC STENOSIS: ICD-10-CM

## 2023-05-31 DIAGNOSIS — Z92.89 PERSONAL HISTORY OF OTHER MEDICAL TREATMENT: Chronic | ICD-10-CM

## 2023-05-31 LAB
ALBUMIN SERPL ELPH-MCNC: 4.9 G/DL — SIGNIFICANT CHANGE UP (ref 3.3–5)
ALP SERPL-CCNC: 79 U/L — SIGNIFICANT CHANGE UP (ref 40–120)
ALT FLD-CCNC: 18 U/L — SIGNIFICANT CHANGE UP (ref 4–41)
ANION GAP SERPL CALC-SCNC: 14 MMOL/L — SIGNIFICANT CHANGE UP (ref 7–14)
AST SERPL-CCNC: 15 U/L — SIGNIFICANT CHANGE UP (ref 4–40)
BILIRUB SERPL-MCNC: 0.3 MG/DL — SIGNIFICANT CHANGE UP (ref 0.2–1.2)
BUN SERPL-MCNC: 12 MG/DL — SIGNIFICANT CHANGE UP (ref 7–23)
CALCIUM SERPL-MCNC: 9 MG/DL — SIGNIFICANT CHANGE UP (ref 8.4–10.5)
CHLORIDE SERPL-SCNC: 104 MMOL/L — SIGNIFICANT CHANGE UP (ref 98–107)
CO2 SERPL-SCNC: 23 MMOL/L — SIGNIFICANT CHANGE UP (ref 22–31)
CREAT SERPL-MCNC: 0.59 MG/DL — SIGNIFICANT CHANGE UP (ref 0.5–1.3)
EGFR: 127 ML/MIN/1.73M2 — SIGNIFICANT CHANGE UP
GLUCOSE SERPL-MCNC: 75 MG/DL — SIGNIFICANT CHANGE UP (ref 70–99)
HCT VFR BLD CALC: 45.5 % — SIGNIFICANT CHANGE UP (ref 39–50)
HGB BLD-MCNC: 15.1 G/DL — SIGNIFICANT CHANGE UP (ref 13–17)
MCHC RBC-ENTMCNC: 27.8 PG — SIGNIFICANT CHANGE UP (ref 27–34)
MCHC RBC-ENTMCNC: 33.2 GM/DL — SIGNIFICANT CHANGE UP (ref 32–36)
MCV RBC AUTO: 83.6 FL — SIGNIFICANT CHANGE UP (ref 80–100)
NRBC # BLD: 0 /100 WBCS — SIGNIFICANT CHANGE UP (ref 0–0)
NRBC # FLD: 0 K/UL — SIGNIFICANT CHANGE UP (ref 0–0)
PLATELET # BLD AUTO: 262 K/UL — SIGNIFICANT CHANGE UP (ref 150–400)
POTASSIUM SERPL-MCNC: 4 MMOL/L — SIGNIFICANT CHANGE UP (ref 3.5–5.3)
POTASSIUM SERPL-SCNC: 4 MMOL/L — SIGNIFICANT CHANGE UP (ref 3.5–5.3)
PROT SERPL-MCNC: 7.1 G/DL — SIGNIFICANT CHANGE UP (ref 6–8.3)
RBC # BLD: 5.44 M/UL — SIGNIFICANT CHANGE UP (ref 4.2–5.8)
RBC # FLD: 11.7 % — SIGNIFICANT CHANGE UP (ref 10.3–14.5)
SODIUM SERPL-SCNC: 141 MMOL/L — SIGNIFICANT CHANGE UP (ref 135–145)
WBC # BLD: 6.36 K/UL — SIGNIFICANT CHANGE UP (ref 3.8–10.5)
WBC # FLD AUTO: 6.36 K/UL — SIGNIFICANT CHANGE UP (ref 3.8–10.5)

## 2023-05-31 RX ORDER — ASPIRIN/CALCIUM CARB/MAGNESIUM 324 MG
1 TABLET ORAL
Refills: 0 | DISCHARGE

## 2023-05-31 RX ORDER — CARBAMAZEPINE 200 MG
1 TABLET ORAL
Qty: 0 | Refills: 0 | DISCHARGE

## 2023-05-31 RX ORDER — CHOLECALCIFEROL (VITAMIN D3) 125 MCG
1 CAPSULE ORAL
Qty: 0 | Refills: 0 | DISCHARGE

## 2023-05-31 RX ORDER — CARBAMAZEPINE 200 MG
2 TABLET ORAL
Qty: 0 | Refills: 0 | DISCHARGE

## 2023-05-31 RX ORDER — WARFARIN SODIUM 2.5 MG/1
1 TABLET ORAL
Qty: 0 | Refills: 0 | DISCHARGE

## 2023-05-31 RX ORDER — TRIHEXYPHENIDYL HCL 2 MG
1 TABLET ORAL
Qty: 0 | Refills: 0 | DISCHARGE

## 2023-05-31 NOTE — H&P PST ADULT - NSICDXFAMILYHX_GEN_ALL_CORE_FT
FAMILY HISTORY:  Father  Still living? Yes, Estimated age: Age Unknown  Family history of premature CAD, Age at diagnosis: Age Unknown  FH: HTN (hypertension), Age at diagnosis: Age Unknown    Mother  Still living? Yes, Estimated age: Age Unknown  Family history of diabetes mellitus (DM), Age at diagnosis: Age Unknown    Grandparent  Still living? No  FH: ovarian cancer, Age at diagnosis: Age Unknown

## 2023-05-31 NOTE — H&P PST ADULT - NSANTHOSAYNRD_GEN_A_CORE
Parents denied sleep studies done in the past/No. JOHNNY screening performed.  STOP BANG Legend: 0-2 = LOW Risk; 3-4 = INTERMEDIATE Risk; 5-8 = HIGH Risk

## 2023-05-31 NOTE — H&P PST ADULT - COMMENTS
Pt is honorary member of fire department. He attends [employer] - participates in food shopping, delivers mail, mows the lawn (supervised) and enjoys bowling.

## 2023-05-31 NOTE — H&P PST ADULT - NEUROLOGICAL COMMENTS
+dystonia, answers questions appropriately however speech is slightly unclear at times. no seizures in over 17 yrs. well controlled on Tegretol.

## 2023-05-31 NOTE — H&P PST ADULT - NEGATIVE ENMT SYMPTOMS
I will SWITCH the dose or number of times a day I take the medications listed below when I get home from the hospital:  None
no hearing difficulty/no ear pain/no tinnitus/no vertigo/no sinus symptoms/no nasal congestion/no nasal discharge/no nasal obstruction/no post-nasal discharge/no nose bleeds/no recurrent cold sores/no abnormal taste sensation/no gum bleeding/no dry mouth/no throat pain/no dysphagia

## 2023-05-31 NOTE — H&P PST ADULT - REASON FOR ADMISSION
"PST evaluation in preparation for a Sedation CT of heart and head ordered by Dr De Luna cardiology to r/o aneurysms"

## 2023-05-31 NOTE — H&P PST ADULT - ADDITIONAL PE
Denies current covid S&S. Pt denies history of travel outside the country and outside [place] and denies COVID19 positive contacts within the last 14 days.

## 2023-05-31 NOTE — H&P PST ADULT - HISTORY OF PRESENT ILLNESS
38 y/o M with PMH significant for CP, dystonia, h/o CVA, seizure d/o, intellectual disability and developmental delays. He is s/p surgical repair of subaortic VSD and aortic coarctation using a subclavian flap technique with PA banding, subsequent takedown of PA banding and patch closure of subaortic VSD. Pt also underwent resection of DANIELLE. Balloon angioplasty was necessary for recurrence of coarctation. In addition he required dual chamber pacemaker placement for high grade AV block. He is 100% ventricular paced with atrial sensing. His most recent pacemaker interrogation was on 3/17/23 (report attached) and his last pacemaker generator change was in 2013. His parents and Denis deny any recent change in activity level including SOB, chest pain, palpitations, dizziness or exercise intolerance. presents to [facility] to be evaluated for a scheduled sedated CT on 6/1/23 to r/o any possible aneurysm as per Cardiology.

## 2023-05-31 NOTE — H&P PST ADULT - ASSESSMENT
Pt w/ PMH of VSD, congenital subaortic stenosis, coarctation of aorta, Prosthetic heart valve , presence of cardiac PPM. Presents for evaluation in preparation for a Sedation CT of heart and head ordered by Dr De Luna cardiology to r/o aneurysms.

## 2023-05-31 NOTE — H&P PST ADULT - PROBLEM SELECTOR PLAN 1
Preop instructions provided to parents at bedside including npo status and GI prophylaxis. Pt to c/w current meds as ordered, Qam/ Qpm Tegretol/ Artane and Qam ASA as ordered. Aware to take meds in am as ordered w/ a sip of water (Pt is Just to hold vitamins on day of procedure), Verbalized understanding. BW done, pending results.

## 2023-05-31 NOTE — H&P PST ADULT - OTHER CARE PROVIDERS
Cardiologist: Dr. Pete De Luna; Dr Bassam Garcia (EPS- manages pacemaker); Dr Nixon (Neuro); Dr Raleigh Awad (Dermatologist); Dr Doris Maldonado (Dental); Dr Neil Barker (DPM); Dr Kevin Lee (Ophtho)

## 2023-06-01 ENCOUNTER — RESULT REVIEW (OUTPATIENT)
Age: 38
End: 2023-06-01

## 2023-06-01 ENCOUNTER — TRANSCRIPTION ENCOUNTER (OUTPATIENT)
Age: 38
End: 2023-06-01

## 2023-06-01 ENCOUNTER — APPOINTMENT (OUTPATIENT)
Dept: CT IMAGING | Facility: HOSPITAL | Age: 38
End: 2023-06-01
Payer: MEDICARE

## 2023-06-01 ENCOUNTER — OUTPATIENT (OUTPATIENT)
Dept: OUTPATIENT SERVICES | Age: 38
LOS: 1 days | End: 2023-06-01

## 2023-06-01 VITALS
OXYGEN SATURATION: 96 % | SYSTOLIC BLOOD PRESSURE: 145 MMHG | HEIGHT: 62.99 IN | HEART RATE: 76 BPM | TEMPERATURE: 98 F | DIASTOLIC BLOOD PRESSURE: 81 MMHG | WEIGHT: 132.06 LBS | RESPIRATION RATE: 18 BRPM

## 2023-06-01 VITALS
DIASTOLIC BLOOD PRESSURE: 81 MMHG | RESPIRATION RATE: 18 BRPM | HEART RATE: 76 BPM | SYSTOLIC BLOOD PRESSURE: 145 MMHG | OXYGEN SATURATION: 96 %

## 2023-06-01 DIAGNOSIS — Z98.890 OTHER SPECIFIED POSTPROCEDURAL STATES: Chronic | ICD-10-CM

## 2023-06-01 DIAGNOSIS — Q24.4 CONGENITAL SUBAORTIC STENOSIS: ICD-10-CM

## 2023-06-01 DIAGNOSIS — Q21.0 VENTRICULAR SEPTAL DEFECT: ICD-10-CM

## 2023-06-01 DIAGNOSIS — Z95.2 PRESENCE OF PROSTHETIC HEART VALVE: ICD-10-CM

## 2023-06-01 DIAGNOSIS — Z92.89 PERSONAL HISTORY OF OTHER MEDICAL TREATMENT: Chronic | ICD-10-CM

## 2023-06-01 DIAGNOSIS — Q25.1 COARCTATION OF AORTA: ICD-10-CM

## 2023-06-01 DIAGNOSIS — Z95.0 PRESENCE OF CARDIAC PACEMAKER: ICD-10-CM

## 2023-06-01 PROCEDURE — 75573 CT HRT C+ STRUX CGEN HRT DS: CPT | Mod: 26

## 2023-06-01 NOTE — ASU PATIENT PROFILE, ADULT - FALL HARM RISK - HARM RISK INTERVENTIONS

## 2023-06-01 NOTE — ASU DISCHARGE PLAN (ADULT/PEDIATRIC) - NS MD DC FALL RISK RISK
For information on Fall & Injury Prevention, visit: [email or web address] OR  [email or web address] OR  https://www.cdc.gov/steadi/patient.html

## 2023-06-03 ENCOUNTER — EMERGENCY (EMERGENCY)
Age: 38
LOS: 1 days | Discharge: HOME | End: 2023-06-03
Attending: EMERGENCY MEDICINE | Admitting: EMERGENCY MEDICINE
Payer: MEDICARE

## 2023-06-03 VITALS
TEMPERATURE: 98 F | DIASTOLIC BLOOD PRESSURE: 78 MMHG | RESPIRATION RATE: 18 BRPM | SYSTOLIC BLOOD PRESSURE: 155 MMHG | OXYGEN SATURATION: 100 % | WEIGHT: 131.95 LBS | HEART RATE: 79 BPM

## 2023-06-03 VITALS
DIASTOLIC BLOOD PRESSURE: 100 MMHG | TEMPERATURE: 98 F | OXYGEN SATURATION: 100 % | HEART RATE: 74 BPM | SYSTOLIC BLOOD PRESSURE: 158 MMHG | HEIGHT: 63 IN | RESPIRATION RATE: 18 BRPM

## 2023-06-03 DIAGNOSIS — Z92.89 PERSONAL HISTORY OF OTHER MEDICAL TREATMENT: Chronic | ICD-10-CM

## 2023-06-03 DIAGNOSIS — Z98.890 OTHER SPECIFIED POSTPROCEDURAL STATES: Chronic | ICD-10-CM

## 2023-06-03 PROCEDURE — 99284 EMERGENCY DEPT VISIT MOD MDM: CPT

## 2023-06-03 RX ORDER — TETANUS TOXOID, REDUCED DIPHTHERIA TOXOID AND ACELLULAR PERTUSSIS VACCINE, ADSORBED 5; 2.5; 8; 8; 2.5 [IU]/.5ML; [IU]/.5ML; UG/.5ML; UG/.5ML; UG/.5ML
0.5 SUSPENSION INTRAMUSCULAR ONCE
Refills: 0 | Status: COMPLETED | OUTPATIENT
Start: 2023-06-03 | End: 2023-06-03

## 2023-06-03 RX ADMIN — TETANUS TOXOID, REDUCED DIPHTHERIA TOXOID AND ACELLULAR PERTUSSIS VACCINE, ADSORBED 0.5 MILLILITER(S): 5; 2.5; 8; 8; 2.5 SUSPENSION INTRAMUSCULAR at 23:30

## 2023-06-03 NOTE — ED STATDOCS - OBJECTIVE STATEMENT
37-year-old male here for facial injury.  He is a toe walker and yesterday was running and fell onto his face.  No LOC.  Was fine yesterday and today all day was not himself, complaining that his nose was hurting.  No vomiting.  Eating and drinking fine.  NKDA.  Meds–Tegretol, Artane, multivitamin, aspirin  Vaccines up-to-date.  History of VSD, coarct of the aorta, stroke as infant, developmental delay, seizures.  Followed with Dr. De Luna for cardiology here, follows with adult neurology and adult cardiology for pacemaker.  History of pulmonary valve replacement, repair of coarctation, hip surgery, pacemaker placement.  Here vital signs stable.  On exam he is awake and alert.  Face–abrasions to right forehead, right cheek.  Nose–swelling to bridge of nose, no septal hematoma.  Heart–S1-S2 normal with machinelike murmur.  Lungs–CTA bilaterally.  Abdomen soft nontender.  Given age will transfer to the adult ER for further work-up.

## 2023-06-03 NOTE — ED PROVIDER NOTE - ATTENDING CONTRIBUTION TO CARE
37M past medical history CP, dystonia, h/o CVA, seizure d/o, intellectual disability and developmental delays, s/p surgical repair of subaortic VSD and aortic coarctation, presents after witnessed mechanical fall where he landed on the bridge of his nose. Parents state that he is acting like his usual self. No nausea, vomiting. They are not sure when he last got a tetanus booster

## 2023-06-03 NOTE — ED ADULT TRIAGE NOTE - CHIEF COMPLAINT QUOTE
mechanical fall, no LOC, laceration to nose/hematoma to lip- no blood thinners, endorsing no complaints

## 2023-06-03 NOTE — ED PROVIDER NOTE - PROGRESS NOTE DETAILS
Moshe Waters MD: Patient's labs and/or imaging have been reviewed.  CT scan does not reveal any intracranial hemorrhage or cervical spine injury.  Patient has normal neurologic assessment.  They are safe for discharge.

## 2023-06-03 NOTE — ED PROVIDER NOTE - NSFOLLOWUPINSTRUCTIONS_ED_ALL_ED_FT
You were seen in the emergency department for a head injury. We have evaluated you and determined that you do not require further hospital interventions.    During your stay you had the following relevant results: a CT scan that does not show any bleeding in the brain or a fracture in the neck    Please follow up with your primary care provider as necessary to discuss the results of your stay in our department.    If you start to experience any of the following please return to the emergency department for further evaluation:  • Sudden and severe headache ("worst headache of your life")  • Neurological changes like weakness, numbness, difficulty speaking, or vision problems  • Changes in mental status or consciousness  • Persistent vomiting or inability to tolerate light

## 2023-06-03 NOTE — ED PROVIDER NOTE - PATIENT PORTAL LINK FT
You can access the FollowMyHealth Patient Portal offered by [facility] by registering at the following website: [email or web address]. By joining [employer]’s FollowMyHealth portal, you will also be able to view your health information using other applications (apps) compatible with our system.

## 2023-06-03 NOTE — ED PEDIATRIC TRIAGE NOTE - CHIEF COMPLAINT QUOTE
pt fell onto floor, no LOC, no vomiting. pt started complaining tonight that his nose hurt. pt with abrasion on nose forehead and lip. pt with no difficulty breathing.   PMH of CHD, seizure disorder, dystonia, pacemaker right side, pulmonary heart valve replacement, CP left side. NKDA, IUTD

## 2023-06-03 NOTE — ED PROVIDER NOTE - CLINICAL SUMMARY MEDICAL DECISION MAKING FREE TEXT BOX
HPI:     All other systems negative unless otherwise specified in patient history.    General: well appearing, alert, oriented to person, time, place  Psych: mood appropriate  Head: normocephalic; atraumatic  Eyes: PERRLA, EOMI, conjunctivae clear bilaterally, sclerae anicteric  ENT: no nasal flaring, patent nares  Cardio: RRR, no m/r/g, pulses 2+ b/l  Resp: CATB, no w/r/r  GI: soft/nondistended/nontender  : no CVA tenderness  Neuro: normal sensation, moving all four extremities equally  Skin: No evidence of rash or bruising  MSK: normal movement of all extremities  Lymph/Vasc: no LE edema    Medical Decision Making: HPI: 37M past medical history CP, dystonia, h/o CVA, seizure d/o, intellectual disability and developmental delays, s/p surgical repair of subaortic VSD and aortic coarctation, presents after witnessed mechanical fall where he landed on the bridge of his nose. Parents state that he is acting like his usual self. No nausea, vomiting. They are not sure when he last got a tetanus booster    All other systems negative unless otherwise specified in patient history.    General: well appearing, alert, oriented to person, time, place  Psych: mood appropriate  Head: small, dry abrasions noted over bridge of nose; no ecchymoses  Eyes: conjunctivae clear bilaterally, sclerae anicteric  ENT: no nasal flaring, patent nares  Cardio: skin warm and well perfused  Resp: normal respiratory effort  GI: no active vomiting  Neuro: normal sensation, moving all four extremities equally  Skin: No evidence of rash or bruising  MSK: normal movement of all extremities  Lymph/Vasc: no LE edema    Medical Decision MakinM with complicated medical history here for witnessed fall with head trauma. Plan is to obtain CT head to exclude intracranial hemorrhage. Patient will receive tetanus booster. Normal neurologic status, can be discharged if unremarkable imaging.

## 2023-06-03 NOTE — ED ADULT NURSE NOTE - OBJECTIVE STATEMENT
Junior RN: patient is A&OX2 at mental status baseline. Pt coming to ED with family in regards to a fall that occurred yesterday. Pt family states he normally walks on his toes and was running in the house when he had a mechanical fall, family states pt fell forwards hurting his face. Abrasion noted above left eyebrow and nose. PMHx of intellectual disability. pt family states "Denis has been acting like his normal self". patient is calm and cooperative, respiration are even and unlabored, no visible pain or discomfort noted.

## 2023-06-04 PROCEDURE — 70450 CT HEAD/BRAIN W/O DYE: CPT | Mod: 26,MA

## 2023-06-04 PROCEDURE — 72125 CT NECK SPINE W/O DYE: CPT | Mod: 26,MA

## 2023-06-04 PROCEDURE — 70486 CT MAXILLOFACIAL W/O DYE: CPT | Mod: 26,MA

## 2023-06-19 ENCOUNTER — NON-APPOINTMENT (OUTPATIENT)
Age: 38
End: 2023-06-19

## 2023-06-19 ENCOUNTER — APPOINTMENT (OUTPATIENT)
Dept: ELECTROPHYSIOLOGY | Facility: CLINIC | Age: 38
End: 2023-06-19
Payer: MEDICARE

## 2023-06-19 PROCEDURE — 93296 REM INTERROG EVL PM/IDS: CPT

## 2023-06-19 PROCEDURE — 93294 REM INTERROG EVL PM/LDLS PM: CPT

## 2023-08-24 LAB — CARBAMAZEPINE SERPL-MCNC: 10.3 UG/ML

## 2023-09-01 ENCOUNTER — TRANSCRIPTION ENCOUNTER (OUTPATIENT)
Age: 38
End: 2023-09-01

## 2023-09-14 ENCOUNTER — APPOINTMENT (OUTPATIENT)
Dept: ELECTROPHYSIOLOGY | Facility: CLINIC | Age: 38
End: 2023-09-14
Payer: MEDICARE

## 2023-09-14 ENCOUNTER — NON-APPOINTMENT (OUTPATIENT)
Age: 38
End: 2023-09-14

## 2023-09-14 VITALS — DIASTOLIC BLOOD PRESSURE: 86 MMHG | HEART RATE: 72 BPM | SYSTOLIC BLOOD PRESSURE: 113 MMHG

## 2023-09-14 DIAGNOSIS — I44.2 ATRIOVENTRICULAR BLOCK, COMPLETE: ICD-10-CM

## 2023-09-14 PROCEDURE — 93280 PM DEVICE PROGR EVAL DUAL: CPT

## 2023-09-15 ENCOUNTER — APPOINTMENT (OUTPATIENT)
Dept: ELECTROPHYSIOLOGY | Facility: CLINIC | Age: 38
End: 2023-09-15

## 2023-10-27 ENCOUNTER — OFFICE (OUTPATIENT)
Dept: URBAN - METROPOLITAN AREA CLINIC 109 | Facility: CLINIC | Age: 38
Setting detail: OPHTHALMOLOGY
End: 2023-10-27
Payer: MEDICARE

## 2023-10-27 DIAGNOSIS — H40.013: ICD-10-CM

## 2023-10-27 DIAGNOSIS — H35.40: ICD-10-CM

## 2023-10-27 PROCEDURE — 92133 CPTRZD OPH DX IMG PST SGM ON: CPT | Performed by: OPHTHALMOLOGY

## 2023-10-27 PROCEDURE — 99213 OFFICE O/P EST LOW 20 MIN: CPT | Performed by: OPHTHALMOLOGY

## 2023-10-27 ASSESSMENT — REFRACTION_AUTOREFRACTION
OS_SPHERE: +1.25
OS_AXIS: 008
OS_CYLINDER: -0.75
OD_AXIS: 165
OD_CYLINDER: -0.25
OD_SPHERE: +1.00

## 2023-10-27 ASSESSMENT — TONOMETRY
OD_IOP_MMHG: 16
OS_IOP_MMHG: 16

## 2023-10-27 ASSESSMENT — VISUAL ACUITY
OS_BCVA: 20/25
OD_BCVA: 20/25

## 2023-10-27 ASSESSMENT — CONFRONTATIONAL VISUAL FIELD TEST (CVF)
OD_FINDINGS: FULL
OS_FINDINGS: FULL

## 2023-10-27 ASSESSMENT — SPHEQUIV_DERIVED
OS_SPHEQUIV: 0.875
OD_SPHEQUIV: 0.875

## 2023-11-08 ENCOUNTER — APPOINTMENT (OUTPATIENT)
Dept: PEDIATRIC CARDIOLOGY | Facility: CLINIC | Age: 38
End: 2023-11-08
Payer: MEDICARE

## 2023-11-08 VITALS — BODY MASS INDEX: 22.58 KG/M2 | RESPIRATION RATE: 20 BRPM | HEART RATE: 72 BPM | HEIGHT: 64.17 IN | WEIGHT: 132.28 LBS

## 2023-11-08 VITALS — SYSTOLIC BLOOD PRESSURE: 144 MMHG | DIASTOLIC BLOOD PRESSURE: 81 MMHG | OXYGEN SATURATION: 97 %

## 2023-11-08 PROCEDURE — 99214 OFFICE O/P EST MOD 30 MIN: CPT

## 2023-12-15 ENCOUNTER — NON-APPOINTMENT (OUTPATIENT)
Age: 38
End: 2023-12-15

## 2023-12-15 ENCOUNTER — APPOINTMENT (OUTPATIENT)
Dept: ELECTROPHYSIOLOGY | Facility: CLINIC | Age: 38
End: 2023-12-15
Payer: MEDICARE

## 2023-12-15 PROCEDURE — 93296 REM INTERROG EVL PM/IDS: CPT

## 2023-12-15 PROCEDURE — 93294 REM INTERROG EVL PM/LDLS PM: CPT

## 2024-01-08 RX ORDER — TRIHEXYPHENIDYL HYDROCHLORIDE 5 MG/1
5 TABLET ORAL
Qty: 180 | Refills: 1 | Status: ACTIVE | COMMUNITY
Start: 2019-11-19 | End: 1900-01-01

## 2024-01-08 RX ORDER — CARBAMAZEPINE 200 MG/1
200 TABLET ORAL
Qty: 270 | Refills: 1 | Status: ACTIVE | COMMUNITY
Start: 2019-11-19 | End: 1900-01-01

## 2024-01-09 ENCOUNTER — RESULT REVIEW (OUTPATIENT)
Age: 39
End: 2024-01-09

## 2024-01-11 ENCOUNTER — OUTPATIENT (OUTPATIENT)
Dept: OUTPATIENT SERVICES | Facility: HOSPITAL | Age: 39
LOS: 1 days | End: 2024-01-11
Payer: MEDICARE

## 2024-01-11 ENCOUNTER — APPOINTMENT (OUTPATIENT)
Dept: CT IMAGING | Facility: CLINIC | Age: 39
End: 2024-01-11
Payer: MEDICARE

## 2024-01-11 DIAGNOSIS — Z98.890 OTHER SPECIFIED POSTPROCEDURAL STATES: Chronic | ICD-10-CM

## 2024-01-11 DIAGNOSIS — Q25.1 COARCTATION OF AORTA: ICD-10-CM

## 2024-01-11 DIAGNOSIS — Z95.0 PRESENCE OF CARDIAC PACEMAKER: ICD-10-CM

## 2024-01-11 DIAGNOSIS — Z92.89 PERSONAL HISTORY OF OTHER MEDICAL TREATMENT: Chronic | ICD-10-CM

## 2024-01-11 PROCEDURE — 70496 CT ANGIOGRAPHY HEAD: CPT | Mod: 26

## 2024-01-11 PROCEDURE — 70496 CT ANGIOGRAPHY HEAD: CPT

## 2024-01-12 ENCOUNTER — TRANSCRIPTION ENCOUNTER (OUTPATIENT)
Age: 39
End: 2024-01-12

## 2024-01-22 ENCOUNTER — TRANSCRIPTION ENCOUNTER (OUTPATIENT)
Age: 39
End: 2024-01-22

## 2024-01-24 ENCOUNTER — APPOINTMENT (OUTPATIENT)
Dept: NEUROLOGY | Facility: CLINIC | Age: 39
End: 2024-01-24
Payer: MEDICARE

## 2024-01-24 DIAGNOSIS — G40.909 EPILEPSY, UNSPECIFIED, NOT INTRACTABLE, W/OUT STATUS EPILEPTICUS: ICD-10-CM

## 2024-01-24 DIAGNOSIS — Z51.81 ENCOUNTER FOR THERAPEUTIC DRUG LVL MONITORING: ICD-10-CM

## 2024-01-24 DIAGNOSIS — M85.80 OTHER SPECIFIED DISORDERS OF BONE DENSITY AND STRUCTURE, UNSPECIFIED SITE: ICD-10-CM

## 2024-01-24 PROCEDURE — G2211 COMPLEX E/M VISIT ADD ON: CPT

## 2024-01-24 PROCEDURE — 99213 OFFICE O/P EST LOW 20 MIN: CPT

## 2024-01-24 NOTE — CONSULT LETTER
[Dear  ___] : Dear  [unfilled], [Consult Letter:] : I had the pleasure of evaluating your patient, [unfilled]. [( Thank you for referring [unfilled] for consultation for _____ )] : Thank you for referring [unfilled] for consultation for [unfilled] [Consult Closing:] : Thank you very much for allowing me to participate in the care of this patient.  If you have any questions, please do not hesitate to contact me. [Please see my note below.] : Please see my note below. [Sincerely,] : Sincerely, [FreeTextEntry2] : Trev Gillespie MD.\par  Address: [address] [place] [address] [address], [place] [address] [FreeTextEntry3] : Mo Nixon MD, WINDY\par  Board Certified: Neurology, Clinical Neurophysiology, Epilepsy\par  , Department of Neurology\par  Epilepsy Fellowship \par  [facility]\par  \par

## 2024-01-24 NOTE — ASSESSMENT
[FreeTextEntry1] : CP, seizures, well controlled x many years syncope s/p PPM / revision, cardiac surgeries in past Dystonia- late onset RUE. Had avoided botox in past, had 3 inj prior, no effect. Worried about systemic effects.  Vit D, Ca for CBZ risk of bone loss. Bone density test, high risk due to inactivity, epilepsy, fair skin, chronic CBZ use.  Consider alt AED with lower risk of osteoporosis in future. ie LEV, LAC, APT/OXC f/u bone density test, on vit D for osteopenia Z score 1.1 ASA 81mg/d  f/u 1yr

## 2024-01-24 NOTE — DATA REVIEWED
[de-identified] : 2003 MRI "NL" @[facility] [de-identified] : [facility] 2019 mild slowing [de-identified] : Labs: 8/20 cbc, bmp, lft CBZ 9.7

## 2024-01-24 NOTE — PHYSICAL EXAM
[General Appearance - Well Nourished] : well nourished [General Appearance - Well-Appearing] : healthy appearing [Affect] : the affect was normal [Mood] : the mood was normal [Repeating Phrases] : no difficulty repeating a phrase [Comprehension] : comprehension intact [Cranial Nerves Oculomotor (III)] : extraocular motion intact [Cranial Nerves Trigeminal (V)] : facial sensation intact symmetrically [Cranial Nerves Facial (VII)] : face symmetrical [Cranial Nerves Vestibulocochlear (VIII)] : hearing was intact bilaterally [Cranial Nerves Glossopharyngeal (IX)] : tongue and palate midline [Cranial Nerves Hypoglossal (XII)] : there was no tongue deviation with protrusion [Cranial Nerves Accessory (XI - Cranial And Spinal)] : head turning and shoulder shrug symmetric [Motor Handedness Right-Handed] : the patient is right hand dominant [Motor Strength Upper Extremities Right] : there was weakness of the right upper extremity [Motor Strength Upper Extremities Left] : there was weakness of the left upper extremity [Sensation Tactile Decrease] : light touch was intact [Sclera] : the sclera and conjunctiva were normal [Outer Ear] : the ears and nose were normal in appearance [Neck Appearance] : the appearance of the neck was normal [Heart Rate And Rhythm] : heart rate was normal and rhythm regular [Abdomen Tenderness] : non-tender [] : no rash [Nail Clubbing] : no clubbing  or cyanosis of the fingernails [Skin Lesions] : no skin lesions [Fluency] : fluency not intact [Reading] : difficulty reading [Past History] : inadequate knowledge of personal past history [Past-pointing] : there was no past-pointing [FreeTextEntry6] : right inc tone, dystonia of wrist, arm.  RLE circumducts, toe drag with walking.  [FreeTextEntry8] : RUE motor control, clumsy [FreeTextEntry1] : murmur noted

## 2024-01-24 NOTE — REVIEW OF SYSTEMS
[As Noted in HPI] : as noted in HPI [As noted in HPI] : as noted in HPI [Negative] : Endocrine [SOB on Exertion] : no shortness of breath during exertion [Orthopnea] : no orthopnea

## 2024-01-26 ENCOUNTER — TRANSCRIPTION ENCOUNTER (OUTPATIENT)
Age: 39
End: 2024-01-26

## 2024-01-30 ENCOUNTER — TRANSCRIPTION ENCOUNTER (OUTPATIENT)
Age: 39
End: 2024-01-30

## 2024-01-31 ENCOUNTER — TRANSCRIPTION ENCOUNTER (OUTPATIENT)
Age: 39
End: 2024-01-31

## 2024-02-01 ENCOUNTER — TRANSCRIPTION ENCOUNTER (OUTPATIENT)
Age: 39
End: 2024-02-01

## 2024-02-01 ENCOUNTER — OUTPATIENT (OUTPATIENT)
Dept: OUTPATIENT SERVICES | Facility: HOSPITAL | Age: 39
LOS: 1 days | End: 2024-02-01
Payer: MEDICARE

## 2024-02-01 ENCOUNTER — APPOINTMENT (OUTPATIENT)
Dept: RADIOLOGY | Facility: CLINIC | Age: 39
End: 2024-02-01
Payer: MEDICARE

## 2024-02-01 DIAGNOSIS — M85.80 OTHER SPECIFIED DISORDERS OF BONE DENSITY AND STRUCTURE, UNSPECIFIED SITE: ICD-10-CM

## 2024-02-01 DIAGNOSIS — Z98.890 OTHER SPECIFIED POSTPROCEDURAL STATES: Chronic | ICD-10-CM

## 2024-02-01 DIAGNOSIS — Z92.89 PERSONAL HISTORY OF OTHER MEDICAL TREATMENT: Chronic | ICD-10-CM

## 2024-02-01 LAB
HCT VFR BLD CALC: 43.5 %
HGB BLD-MCNC: 15.2 G/DL
MCHC RBC-ENTMCNC: 29.2 PG
MCHC RBC-ENTMCNC: 34.9 GM/DL
MCV RBC AUTO: 83.7 FL
PLATELET # BLD AUTO: 247 K/UL
RBC # BLD: 5.2 M/UL
RBC # FLD: 12.4 %
WBC # FLD AUTO: 5.25 K/UL

## 2024-02-01 PROCEDURE — 77085 DXA BONE DENSITY AXL VRT FX: CPT

## 2024-02-01 PROCEDURE — 77085 DXA BONE DENSITY AXL VRT FX: CPT | Mod: 26

## 2024-02-02 ENCOUNTER — TRANSCRIPTION ENCOUNTER (OUTPATIENT)
Age: 39
End: 2024-02-02

## 2024-02-02 LAB
25(OH)D3 SERPL-MCNC: 35.9 NG/ML
ALBUMIN SERPL ELPH-MCNC: 4.8 G/DL
ALP BLD-CCNC: 88 U/L
ALT SERPL-CCNC: 17 U/L
ANION GAP SERPL CALC-SCNC: 12 MMOL/L
AST SERPL-CCNC: 15 U/L
BILIRUB DIRECT SERPL-MCNC: 0.1 MG/DL
BILIRUB INDIRECT SERPL-MCNC: 0.2 MG/DL
BILIRUB SERPL-MCNC: 0.3 MG/DL
BUN SERPL-MCNC: 13 MG/DL
CALCIUM SERPL-MCNC: 9.5 MG/DL
CARBAMAZEPINE SERPL-MCNC: 11.5 UG/ML
CHLORIDE SERPL-SCNC: 100 MMOL/L
CO2 SERPL-SCNC: 27 MMOL/L
CREAT SERPL-MCNC: 0.71 MG/DL
EGFR: 119 ML/MIN/1.73M2
GLUCOSE SERPL-MCNC: 90 MG/DL
POTASSIUM SERPL-SCNC: 4.6 MMOL/L
PROT SERPL-MCNC: 7.3 G/DL
SODIUM SERPL-SCNC: 139 MMOL/L

## 2024-02-11 ENCOUNTER — NON-APPOINTMENT (OUTPATIENT)
Age: 39
End: 2024-02-11

## 2024-02-12 ENCOUNTER — TRANSCRIPTION ENCOUNTER (OUTPATIENT)
Age: 39
End: 2024-02-12

## 2024-02-12 DIAGNOSIS — G24.9 DYSTONIA, UNSPECIFIED: ICD-10-CM

## 2024-02-21 ENCOUNTER — TRANSCRIPTION ENCOUNTER (OUTPATIENT)
Age: 39
End: 2024-02-21

## 2024-02-27 ENCOUNTER — TRANSCRIPTION ENCOUNTER (OUTPATIENT)
Age: 39
End: 2024-02-27

## 2024-03-06 NOTE — HISTORY OF PRESENT ILLNESS
[FreeTextEntry1] : Taking Artane 5mg bid (max 15 tid in past), Tegretol brand 200/400mg, asa 81/d, Vt D3 2000mcg AEDs PHT and PB  in infancy only.  Updates:  Recent fall, tripped. in ER. ok now No seizures.  Had pulm heart valve replacement 2020.  on ASA 81/d Some exercise, less of late.  mom/dad feels he looks good. some boredom, no [facility] day program since about 2019.   Trip to [place] Boosted  vaccines Dystonia stable, tremors  CT HEAD: No acute intracranial hemorrhage. Small band of encephalomalacia/gliotic change in the left parietal lobe. No hydrocephalus. The bilateral MCAs and ACAs are within normal limits. The anterior communicating artery is unremarkable. Fairly symmetric arborization of the bilateral MCA distributions.  The right vertebral artery opacifies normally with intraluminal contrast. Poor visualization of the distal intradural segment of the left vertebral artery, likely diminutive versus less likely occluded. The right vertebral artery is dominant. The basilar artery is unremarkable. There is a left-sided fetal PCA. Questionable duplication of the right PCA. The proximal SCAs are within normal limits.   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 His history is significant for CHB requiring pacemaker placement. history is significant for surgery of heart at age 1weeks req 6 weeks on ventilator.  a CVA and cardiac arrest  as an infant. Seizures since infancy thereafter on PB, then CBZ.   GTCS, full body convulsions, lasting 2-5min. No recent breakthrough seizures since about age 16-17  Episode LOC 2005.  PPM since 2005   Right hand dystonia, paresis, cerebral palsy. Was evaluated for botox for RUE, but did not continue. Achilles surgery in past.  scoliosis surgery. Born FT, CC, Apgars 10 @1 and 5.   Likes to watch hockey, dependent on family, bocce ball, movies.  no

## 2024-03-12 ENCOUNTER — TRANSCRIPTION ENCOUNTER (OUTPATIENT)
Age: 39
End: 2024-03-12

## 2024-03-14 ENCOUNTER — APPOINTMENT (OUTPATIENT)
Dept: ORTHOPEDIC SURGERY | Facility: CLINIC | Age: 39
End: 2024-03-14
Payer: MEDICARE

## 2024-03-14 DIAGNOSIS — R26.89 OTHER ABNORMALITIES OF GAIT AND MOBILITY: ICD-10-CM

## 2024-03-14 DIAGNOSIS — G80.9 CEREBRAL PALSY, UNSPECIFIED: ICD-10-CM

## 2024-03-14 PROCEDURE — 73610 X-RAY EXAM OF ANKLE: CPT | Mod: 50

## 2024-03-14 PROCEDURE — 99204 OFFICE O/P NEW MOD 45 MIN: CPT

## 2024-03-14 PROCEDURE — 73630 X-RAY EXAM OF FOOT: CPT | Mod: 50

## 2024-03-14 NOTE — PHYSICAL EXAM
[de-identified] : Bilateral foot Physical Examination:  General: Alert and oriented x3.  In no acute distress.  Pleasant in nature with a normal affect.  No apparent respiratory distress.  Erythema, Warmth, Rubor: Negative Swelling: Negative  ROM Ankle: 1. Dorsiflexion: 10 degrees 2. Plantarflexion: 40 degrees 3. Inversion: 30 degrees 4. Eversion: 20 degrees  ROM of digits: Normal  Pes Planus: Negative Pes Cavus: Negative  Bunion: Negative Tailor's Bunion (Bunionette): Negative Hammer Toe Deformity/Deformities: Negative  Tenderness to Palpation:  1. Heel Pain: Negative 2. Midfoot Pain: Negative 3. First MTP Joint: Negative 4. Lis Franc Joint: Negative  Tenderness Metatarsals: 1st MT: Negative 2nd MT: Negative 3rd MT: Negative 4th MT: Negative 5th MT: Negative Base of the 5th MT: Negative  Ligament Pain: 1. Lis Franc Ligament: Negative 2. Plantar Fascia Ligament: Negative  Strength:  5/5 TA/GS/EHL/FHL/EDL/ADD/ABD  Pulses: 2+ DP/PT Pulses  Capillary Refill Toes: <2 seconds  Neuro: Intact motor and sensory throughout  Additional Test: 1. Dee's Squeeze Test: Negative 2. Calcaneal Squeeze Test: Negative [de-identified] : 6 views x-rays bilateral feet reviewed, 3/14/2024: No fractures present.

## 2024-03-14 NOTE — DISCUSSION/SUMMARY
[de-identified] : Today I had a lengthy discussion with the patient regarding their BL toe walking. I have addressed all the patient's concerns surrounding the pathology of their condition. XR imaging was completed in office today and the results were reviewed with the patient. At this time, I would like to continue with conservative management. I recommended that the patient continues with a course of physical therapy for bilateral feet and ankle to work on posterior chain flexibility, strength and gait training. A new prescription was given to the patient today. He should continue with his other physicians for general care. The patient understood and verbally agreed to the treatment plan. All of their questions were answered, and they were satisfied with the visit. The patient should call the office if they have any questions or experience worsening symptoms.  I would like to see the patient back in the office in 6 months to reassess their condition.

## 2024-03-14 NOTE — DISCUSSION/SUMMARY
[de-identified] : Today I had a lengthy discussion with the patient regarding their BL toe walking. I have addressed all the patient's concerns surrounding the pathology of their condition. XR imaging was completed in office today and the results were reviewed with the patient. At this time, I would like to continue with conservative management. I recommended that the patient continues with a course of physical therapy for bilateral feet and ankle to work on posterior chain flexibility, strength and gait training. A new prescription was given to the patient today. He should continue with his other physicians for general care. The patient understood and verbally agreed to the treatment plan. All of their questions were answered, and they were satisfied with the visit. The patient should call the office if they have any questions or experience worsening symptoms.  I would like to see the patient back in the office in 6 months to reassess their condition.

## 2024-03-14 NOTE — H&P PST ADULT - PRIMARY CARE PROVIDER
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 He has been referred to IR by Dr. Alex for consultation regarding a trans jugular liver biopsy with pressure measurements.   Patient denies recent fever, chills, shortness of breath, chest pain, nausea, vomiting or diarrhea.   US from November 2023 reviewed.   **patient is aware that he needs to seek clearance from his PCP and transplant specialist to have this biopsy completed. We would like patient to get clearance to hold his Plavix and Aspirin for 5 days prior to procedure if cleared.  **patient aware he needs to also attend PST prior to procedure
Dr Trev Elise

## 2024-03-14 NOTE — PHYSICAL EXAM
[de-identified] : Bilateral foot Physical Examination:  General: Alert and oriented x3.  In no acute distress.  Pleasant in nature with a normal affect.  No apparent respiratory distress.  Erythema, Warmth, Rubor: Negative Swelling: Negative  ROM Ankle: 1. Dorsiflexion: 10 degrees 2. Plantarflexion: 40 degrees 3. Inversion: 30 degrees 4. Eversion: 20 degrees  ROM of digits: Normal  Pes Planus: Negative Pes Cavus: Negative  Bunion: Negative Tailor's Bunion (Bunionette): Negative Hammer Toe Deformity/Deformities: Negative  Tenderness to Palpation:  1. Heel Pain: Negative 2. Midfoot Pain: Negative 3. First MTP Joint: Negative 4. Lis Franc Joint: Negative  Tenderness Metatarsals: 1st MT: Negative 2nd MT: Negative 3rd MT: Negative 4th MT: Negative 5th MT: Negative Base of the 5th MT: Negative  Ligament Pain: 1. Lis Franc Ligament: Negative 2. Plantar Fascia Ligament: Negative  Strength:  5/5 TA/GS/EHL/FHL/EDL/ADD/ABD  Pulses: 2+ DP/PT Pulses  Capillary Refill Toes: <2 seconds  Neuro: Intact motor and sensory throughout  Additional Test: 1. Dee's Squeeze Test: Negative 2. Calcaneal Squeeze Test: Negative [de-identified] : 6 views x-rays bilateral feet reviewed, 3/14/2024: No fractures present.

## 2024-03-15 ENCOUNTER — NON-APPOINTMENT (OUTPATIENT)
Age: 39
End: 2024-03-15

## 2024-03-15 ENCOUNTER — APPOINTMENT (OUTPATIENT)
Dept: ELECTROPHYSIOLOGY | Facility: CLINIC | Age: 39
End: 2024-03-15
Payer: MEDICARE

## 2024-03-15 PROCEDURE — 93296 REM INTERROG EVL PM/IDS: CPT

## 2024-03-15 PROCEDURE — 93294 REM INTERROG EVL PM/LDLS PM: CPT

## 2024-03-22 ENCOUNTER — TRANSCRIPTION ENCOUNTER (OUTPATIENT)
Age: 39
End: 2024-03-22

## 2024-04-11 LAB
ALBUMIN SERPL ELPH-MCNC: 4.8 G/DL
ALP BLD-CCNC: 94 U/L
ALT SERPL-CCNC: 21 U/L
AST SERPL-CCNC: 19 U/L
BASOPHILS # BLD AUTO: 0.02 K/UL
BASOPHILS NFR BLD AUTO: 0.3 %
BILIRUB DIRECT SERPL-MCNC: 0.1 MG/DL
BILIRUB INDIRECT SERPL-MCNC: 0.2 MG/DL
BILIRUB SERPL-MCNC: 0.4 MG/DL
CARBAMAZEPINE SERPL-MCNC: 8.9 UG/ML
EOSINOPHIL # BLD AUTO: 0.06 K/UL
EOSINOPHIL NFR BLD AUTO: 0.8 %
HCT VFR BLD CALC: 46.9 %
HGB BLD-MCNC: 14.9 G/DL
IMM GRANULOCYTES NFR BLD AUTO: 0.4 %
LYMPHOCYTES # BLD AUTO: 1.26 K/UL
LYMPHOCYTES NFR BLD AUTO: 17.5 %
MAN DIFF?: NORMAL
MCHC RBC-ENTMCNC: 27.8 PG
MCHC RBC-ENTMCNC: 31.8 GM/DL
MCV RBC AUTO: 87.5 FL
MONOCYTES # BLD AUTO: 0.76 K/UL
MONOCYTES NFR BLD AUTO: 10.6 %
NEUTROPHILS # BLD AUTO: 5.05 K/UL
NEUTROPHILS NFR BLD AUTO: 70.4 %
PLATELET # BLD AUTO: 251 K/UL
PROT SERPL-MCNC: 6.9 G/DL
RBC # BLD: 5.36 M/UL
RBC # FLD: 12.1 %
WBC # FLD AUTO: 7.18 K/UL

## 2024-06-14 ENCOUNTER — APPOINTMENT (OUTPATIENT)
Dept: ELECTROPHYSIOLOGY | Facility: CLINIC | Age: 39
End: 2024-06-14
Payer: MEDICARE

## 2024-06-14 ENCOUNTER — NON-APPOINTMENT (OUTPATIENT)
Age: 39
End: 2024-06-14

## 2024-06-14 PROCEDURE — 93294 REM INTERROG EVL PM/LDLS PM: CPT

## 2024-06-14 PROCEDURE — 93296 REM INTERROG EVL PM/IDS: CPT

## 2024-07-08 ENCOUNTER — RX RENEWAL (OUTPATIENT)
Age: 39
End: 2024-07-08

## 2024-08-14 ENCOUNTER — TRANSCRIPTION ENCOUNTER (OUTPATIENT)
Age: 39
End: 2024-08-14

## 2024-08-14 DIAGNOSIS — Z51.81 ENCOUNTER FOR THERAPEUTIC DRUG LVL MONITORING: ICD-10-CM

## 2024-08-14 DIAGNOSIS — G40.909 EPILEPSY, UNSPECIFIED, NOT INTRACTABLE, W/OUT STATUS EPILEPTICUS: ICD-10-CM

## 2024-08-25 LAB
ALBUMIN SERPL ELPH-MCNC: 4.7 G/DL
ALP BLD-CCNC: 80 U/L
ALT SERPL-CCNC: 20 U/L
ANION GAP SERPL CALC-SCNC: 10 MMOL/L
AST SERPL-CCNC: 19 U/L
BILIRUB DIRECT SERPL-MCNC: 0.1 MG/DL
BILIRUB INDIRECT SERPL-MCNC: 0.2 MG/DL
BILIRUB SERPL-MCNC: 0.3 MG/DL
BUN SERPL-MCNC: 16 MG/DL
CALCIUM SERPL-MCNC: 9.5 MG/DL
CARBAMAZEPINE SERPL-MCNC: 10.5 UG/ML
CHLORIDE SERPL-SCNC: 101 MMOL/L
CO2 SERPL-SCNC: 30 MMOL/L
CREAT SERPL-MCNC: 0.82 MG/DL
EGFR: 113 ML/MIN/1.73M2
GLUCOSE SERPL-MCNC: 86 MG/DL
HCT VFR BLD CALC: 46.1 %
HGB BLD-MCNC: 15.2 G/DL
MCHC RBC-ENTMCNC: 28.7 PG
MCHC RBC-ENTMCNC: 33 GM/DL
MCV RBC AUTO: 87.1 FL
PLATELET # BLD AUTO: 206 K/UL
POTASSIUM SERPL-SCNC: 5.1 MMOL/L
PROT SERPL-MCNC: 6.9 G/DL
RBC # BLD: 5.29 M/UL
RBC # FLD: 12.4 %
SODIUM SERPL-SCNC: 141 MMOL/L
WBC # FLD AUTO: 6.22 K/UL

## 2024-08-26 ENCOUNTER — RX RENEWAL (OUTPATIENT)
Age: 39
End: 2024-08-26

## 2024-08-26 ENCOUNTER — TRANSCRIPTION ENCOUNTER (OUTPATIENT)
Age: 39
End: 2024-08-26

## 2024-09-13 ENCOUNTER — NON-APPOINTMENT (OUTPATIENT)
Age: 39
End: 2024-09-13

## 2024-09-13 ENCOUNTER — APPOINTMENT (OUTPATIENT)
Dept: ELECTROPHYSIOLOGY | Facility: CLINIC | Age: 39
End: 2024-09-13
Payer: MEDICARE

## 2024-09-13 VITALS — DIASTOLIC BLOOD PRESSURE: 82 MMHG | HEART RATE: 71 BPM | SYSTOLIC BLOOD PRESSURE: 127 MMHG

## 2024-09-13 PROCEDURE — 93280 PM DEVICE PROGR EVAL DUAL: CPT

## 2024-11-04 ENCOUNTER — OFFICE (OUTPATIENT)
Dept: URBAN - METROPOLITAN AREA CLINIC 109 | Facility: CLINIC | Age: 39
Setting detail: OPHTHALMOLOGY
End: 2024-11-04
Payer: MEDICARE

## 2024-11-04 DIAGNOSIS — H35.40: ICD-10-CM

## 2024-11-04 DIAGNOSIS — H40.013: ICD-10-CM

## 2024-11-04 PROCEDURE — 92083 EXTENDED VISUAL FIELD XM: CPT | Performed by: OPHTHALMOLOGY

## 2024-11-04 PROCEDURE — 92250 FUNDUS PHOTOGRAPHY W/I&R: CPT | Performed by: OPHTHALMOLOGY

## 2024-11-04 PROCEDURE — 92014 COMPRE OPH EXAM EST PT 1/>: CPT | Performed by: OPHTHALMOLOGY

## 2024-11-04 ASSESSMENT — REFRACTION_AUTOREFRACTION
OS_SPHERE: +1.25
OS_CYLINDER: -0.75
OD_SPHERE: +1.00
OD_CYLINDER: -0.25
OS_AXIS: 008
OD_AXIS: 165

## 2024-11-04 ASSESSMENT — VISUAL ACUITY
OS_BCVA: 20/30-2
OD_BCVA: 20/25-3

## 2024-11-04 ASSESSMENT — CONFRONTATIONAL VISUAL FIELD TEST (CVF)
OS_FINDINGS: FULL
OD_FINDINGS: FULL

## 2024-11-04 ASSESSMENT — TONOMETRY
OS_IOP_MMHG: 16
OD_IOP_MMHG: 16

## 2024-11-06 ENCOUNTER — APPOINTMENT (OUTPATIENT)
Dept: PEDIATRIC CARDIOLOGY | Facility: CLINIC | Age: 39
End: 2024-11-06
Payer: MEDICARE

## 2024-11-06 VITALS
SYSTOLIC BLOOD PRESSURE: 135 MMHG | HEIGHT: 63.78 IN | OXYGEN SATURATION: 99 % | BODY MASS INDEX: 22.86 KG/M2 | DIASTOLIC BLOOD PRESSURE: 71 MMHG | WEIGHT: 132.28 LBS

## 2024-11-06 VITALS — DIASTOLIC BLOOD PRESSURE: 84 MMHG | SYSTOLIC BLOOD PRESSURE: 135 MMHG

## 2024-11-06 PROCEDURE — G2211 COMPLEX E/M VISIT ADD ON: CPT

## 2024-11-06 PROCEDURE — 99214 OFFICE O/P EST MOD 30 MIN: CPT

## 2024-11-06 PROCEDURE — 93320 DOPPLER ECHO COMPLETE: CPT

## 2024-11-06 PROCEDURE — 93303 ECHO TRANSTHORACIC: CPT

## 2024-11-06 PROCEDURE — 93325 DOPPLER ECHO COLOR FLOW MAPG: CPT

## 2024-11-06 RX ORDER — CALCIUM CARBONATE/VITAMIN D3 500MG-5MCG
500-5 TABLET ORAL
Refills: 0 | Status: ACTIVE | COMMUNITY
Start: 2024-11-06

## 2024-11-13 ENCOUNTER — APPOINTMENT (OUTPATIENT)
Dept: PEDIATRIC CARDIOLOGY | Facility: CLINIC | Age: 39
End: 2024-11-13

## 2024-12-13 ENCOUNTER — NON-APPOINTMENT (OUTPATIENT)
Age: 39
End: 2024-12-13

## 2024-12-13 ENCOUNTER — APPOINTMENT (OUTPATIENT)
Dept: ELECTROPHYSIOLOGY | Facility: CLINIC | Age: 39
End: 2024-12-13
Payer: MEDICARE

## 2024-12-13 PROCEDURE — 93294 REM INTERROG EVL PM/LDLS PM: CPT

## 2024-12-13 PROCEDURE — 93296 REM INTERROG EVL PM/IDS: CPT

## 2025-01-24 ENCOUNTER — APPOINTMENT (OUTPATIENT)
Dept: NEUROLOGY | Facility: CLINIC | Age: 40
End: 2025-01-24

## 2025-02-12 ENCOUNTER — OUTPATIENT (OUTPATIENT)
Dept: OUTPATIENT SERVICES | Facility: HOSPITAL | Age: 40
LOS: 1 days | End: 2025-02-12
Payer: MEDICARE

## 2025-02-12 ENCOUNTER — APPOINTMENT (OUTPATIENT)
Dept: CARDIOLOGY | Facility: CLINIC | Age: 40
End: 2025-02-12

## 2025-02-12 DIAGNOSIS — Q24.4 CONGENITAL SUBAORTIC STENOSIS: ICD-10-CM

## 2025-02-12 DIAGNOSIS — I37.1 NONRHEUMATIC PULMONARY VALVE INSUFFICIENCY: ICD-10-CM

## 2025-02-12 DIAGNOSIS — Z92.89 PERSONAL HISTORY OF OTHER MEDICAL TREATMENT: Chronic | ICD-10-CM

## 2025-02-12 DIAGNOSIS — Z95.2 PRESENCE OF PROSTHETIC HEART VALVE: ICD-10-CM

## 2025-02-12 DIAGNOSIS — Z98.890 OTHER SPECIFIED POSTPROCEDURAL STATES: Chronic | ICD-10-CM

## 2025-02-12 PROCEDURE — 75573 CT HRT C+ STRUX CGEN HRT DS: CPT

## 2025-02-12 NOTE — PRE-OP CHECKLIST, PEDIATRIC - BSA (M2)
Patient called the OOC RN on 2/11/25 for c/o cough and wheezing. Johana Provider Dr Tobias disposition recommendation pt to go to Urgent Care. No Urgent Care encounter noted.  Called patient to follow up. Patient states that he went to an Outside Urgent care this morning, he was given medication and an injection. Patient declines to schedule an appointment with PCP. Patient states he has no additional concerns or needs at this time.    
1.59

## 2025-02-18 ENCOUNTER — TRANSCRIPTION ENCOUNTER (OUTPATIENT)
Age: 40
End: 2025-02-18

## 2025-02-18 DIAGNOSIS — Z51.81 ENCOUNTER FOR THERAPEUTIC DRUG LVL MONITORING: ICD-10-CM

## 2025-02-20 ENCOUNTER — TRANSCRIPTION ENCOUNTER (OUTPATIENT)
Age: 40
End: 2025-02-20

## 2025-02-24 LAB — CARBAMAZEPINE SERPL-MCNC: 10.4 UG/ML

## 2025-02-25 ENCOUNTER — TRANSCRIPTION ENCOUNTER (OUTPATIENT)
Age: 40
End: 2025-02-25

## 2025-02-26 ENCOUNTER — TRANSCRIPTION ENCOUNTER (OUTPATIENT)
Age: 40
End: 2025-02-26

## 2025-03-12 NOTE — H&P PST PEDIATRIC - HEPATITIS C
Patient arrives to the ED for shortness of breath x1 week and rheumatoid arthritis flare up. Patient reports whole body aches. Denies fevers, chest pain.    No Exposure

## 2025-03-17 ENCOUNTER — NON-APPOINTMENT (OUTPATIENT)
Age: 40
End: 2025-03-17

## 2025-03-17 ENCOUNTER — APPOINTMENT (OUTPATIENT)
Dept: ELECTROPHYSIOLOGY | Facility: CLINIC | Age: 40
End: 2025-03-17
Payer: MEDICARE

## 2025-03-17 PROCEDURE — 93296 REM INTERROG EVL PM/IDS: CPT

## 2025-03-17 PROCEDURE — 93294 REM INTERROG EVL PM/LDLS PM: CPT

## 2025-03-19 ENCOUNTER — APPOINTMENT (OUTPATIENT)
Dept: ORTHOPEDIC SURGERY | Facility: CLINIC | Age: 40
End: 2025-03-19
Payer: MEDICARE

## 2025-03-19 DIAGNOSIS — R26.89 OTHER ABNORMALITIES OF GAIT AND MOBILITY: ICD-10-CM

## 2025-03-19 PROCEDURE — 99213 OFFICE O/P EST LOW 20 MIN: CPT

## 2025-03-26 ENCOUNTER — APPOINTMENT (OUTPATIENT)
Dept: NEUROLOGY | Facility: CLINIC | Age: 40
End: 2025-03-26
Payer: MEDICARE

## 2025-03-26 VITALS
BODY MASS INDEX: 23.39 KG/M2 | DIASTOLIC BLOOD PRESSURE: 79 MMHG | SYSTOLIC BLOOD PRESSURE: 130 MMHG | HEIGHT: 63 IN | HEART RATE: 71 BPM | WEIGHT: 132 LBS

## 2025-03-26 DIAGNOSIS — M85.80 OTHER SPECIFIED DISORDERS OF BONE DENSITY AND STRUCTURE, UNSPECIFIED SITE: ICD-10-CM

## 2025-03-26 DIAGNOSIS — G80.9 CEREBRAL PALSY, UNSPECIFIED: ICD-10-CM

## 2025-03-26 DIAGNOSIS — G40.909 EPILEPSY, UNSPECIFIED, NOT INTRACTABLE, W/OUT STATUS EPILEPTICUS: ICD-10-CM

## 2025-03-26 PROCEDURE — G2211 COMPLEX E/M VISIT ADD ON: CPT

## 2025-03-26 PROCEDURE — 99214 OFFICE O/P EST MOD 30 MIN: CPT

## 2025-04-01 ENCOUNTER — APPOINTMENT (OUTPATIENT)
Dept: ELECTROPHYSIOLOGY | Facility: CLINIC | Age: 40
End: 2025-04-01
Payer: MEDICARE

## 2025-04-01 ENCOUNTER — NON-APPOINTMENT (OUTPATIENT)
Age: 40
End: 2025-04-01

## 2025-04-01 VITALS
SYSTOLIC BLOOD PRESSURE: 139 MMHG | WEIGHT: 130 LBS | DIASTOLIC BLOOD PRESSURE: 79 MMHG | RESPIRATION RATE: 16 BRPM | OXYGEN SATURATION: 98 % | HEART RATE: 66 BPM | BODY MASS INDEX: 23.03 KG/M2 | TEMPERATURE: 98 F

## 2025-04-01 PROCEDURE — 99213 OFFICE O/P EST LOW 20 MIN: CPT

## 2025-04-01 PROCEDURE — G2211 COMPLEX E/M VISIT ADD ON: CPT

## 2025-04-01 PROCEDURE — 93000 ELECTROCARDIOGRAM COMPLETE: CPT

## 2025-04-02 ENCOUNTER — TRANSCRIPTION ENCOUNTER (OUTPATIENT)
Age: 40
End: 2025-04-02

## 2025-04-03 ENCOUNTER — TRANSCRIPTION ENCOUNTER (OUTPATIENT)
Age: 40
End: 2025-04-03

## 2025-05-09 ENCOUNTER — OUTPATIENT (OUTPATIENT)
Dept: OUTPATIENT SERVICES | Facility: HOSPITAL | Age: 40
LOS: 1 days | End: 2025-05-09

## 2025-05-09 VITALS
OXYGEN SATURATION: 98 % | RESPIRATION RATE: 16 BRPM | TEMPERATURE: 98 F | HEIGHT: 63 IN | SYSTOLIC BLOOD PRESSURE: 120 MMHG | HEART RATE: 83 BPM | WEIGHT: 130.07 LBS | DIASTOLIC BLOOD PRESSURE: 70 MMHG

## 2025-05-09 DIAGNOSIS — Z92.89 PERSONAL HISTORY OF OTHER MEDICAL TREATMENT: Chronic | ICD-10-CM

## 2025-05-09 DIAGNOSIS — I44.2 ATRIOVENTRICULAR BLOCK, COMPLETE: ICD-10-CM

## 2025-05-09 DIAGNOSIS — Z98.890 OTHER SPECIFIED POSTPROCEDURAL STATES: Chronic | ICD-10-CM

## 2025-05-09 LAB
BASOPHILS # BLD AUTO: 0.05 K/UL — SIGNIFICANT CHANGE UP (ref 0–0.2)
BASOPHILS NFR BLD AUTO: 0.8 % — SIGNIFICANT CHANGE UP (ref 0–2)
EOSINOPHIL # BLD AUTO: 0.05 K/UL — SIGNIFICANT CHANGE UP (ref 0–0.5)
EOSINOPHIL NFR BLD AUTO: 0.8 % — SIGNIFICANT CHANGE UP (ref 0–6)
HCT VFR BLD CALC: 48.8 % — SIGNIFICANT CHANGE UP (ref 39–50)
HGB BLD-MCNC: 16.5 G/DL — SIGNIFICANT CHANGE UP (ref 13–17)
IANC: 4.4 K/UL — SIGNIFICANT CHANGE UP (ref 1.8–7.4)
IMM GRANULOCYTES NFR BLD AUTO: 0.8 % — SIGNIFICANT CHANGE UP (ref 0–0.9)
LYMPHOCYTES # BLD AUTO: 1.31 K/UL — SIGNIFICANT CHANGE UP (ref 1–3.3)
LYMPHOCYTES # BLD AUTO: 19.9 % — SIGNIFICANT CHANGE UP (ref 13–44)
MCHC RBC-ENTMCNC: 29.6 PG — SIGNIFICANT CHANGE UP (ref 27–34)
MCHC RBC-ENTMCNC: 33.8 G/DL — SIGNIFICANT CHANGE UP (ref 32–36)
MCV RBC AUTO: 87.6 FL — SIGNIFICANT CHANGE UP (ref 80–100)
MONOCYTES # BLD AUTO: 0.73 K/UL — SIGNIFICANT CHANGE UP (ref 0–0.9)
MONOCYTES NFR BLD AUTO: 11.1 % — SIGNIFICANT CHANGE UP (ref 2–14)
NEUTROPHILS # BLD AUTO: 4.4 K/UL — SIGNIFICANT CHANGE UP (ref 1.8–7.4)
NEUTROPHILS NFR BLD AUTO: 66.6 % — SIGNIFICANT CHANGE UP (ref 43–77)
NRBC # BLD AUTO: 0 K/UL — SIGNIFICANT CHANGE UP (ref 0–0)
NRBC # FLD: 0 K/UL — SIGNIFICANT CHANGE UP (ref 0–0)
NRBC BLD AUTO-RTO: 0 /100 WBCS — SIGNIFICANT CHANGE UP (ref 0–0)
PLATELET # BLD AUTO: 171 K/UL — SIGNIFICANT CHANGE UP (ref 150–400)
RBC # BLD: 5.57 M/UL — SIGNIFICANT CHANGE UP (ref 4.2–5.8)
RBC # FLD: 12.4 % — SIGNIFICANT CHANGE UP (ref 10.3–14.5)
WBC # BLD: 6.59 K/UL — SIGNIFICANT CHANGE UP (ref 3.8–10.5)
WBC # FLD AUTO: 6.59 K/UL — SIGNIFICANT CHANGE UP (ref 3.8–10.5)

## 2025-05-09 RX ORDER — CARBAMAZEPINE 200 MG/1
1 TABLET ORAL
Refills: 0 | DISCHARGE

## 2025-05-09 NOTE — H&P PST ADULT - NEUROLOGICAL COMMENTS
Hx of seizure disorder, last seizure was in 2005. Maintained on Tegretol. hx of CVA in infancy. +dystonia, answers questions appropriately however speech is slightly unclear at times. Hx of seizure disorder, last seizure was in 2005. Maintained on Tegretol. hx of CVA in infancy with left sided weakness +dystonia of right arm

## 2025-05-09 NOTE — H&P PST ADULT - COMMENTS
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 Dentition: no loose teeth or dentures as per parents

## 2025-05-09 NOTE — H&P PST ADULT - NEGATIVE PSYCHIATRIC SYMPTOMS
no depression/no anxiety/no insomnia/no memory loss/no paranoia/no mood swings/no agitation/no visual hallucinations/no auditory hallucinations/no hyperactivity

## 2025-05-09 NOTE — H&P PST ADULT - NEUROLOGICAL
sensation intact details… cranial nerves II-XII intact/sensation intact/responds to pain/responds to verbal commands

## 2025-05-09 NOTE — H&P PST ADULT - CARDIOVASCULAR COMMENTS
hx of Atrioventricular  block, complete. Started on 81 mg ASA since pulmonary valve replacement in 2019. II/VI murmur noted preop dx of Atrioventricular  block, complete. s/p PPM insertion 2/2005. Started on 81 mg ASA since pulmonary valve replacement in 2019.

## 2025-05-09 NOTE — H&P PST ADULT - NSICDXPASTSURGICALHX_GEN_ALL_CORE_FT
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 Forde    H/O CT scan w/sedation, March 2019    History of Achilles tendon repair bilateral    History of aortic coarctation repair     Pacemaker Medtronic dual chamber for 3rd degree AVB 3/4/05    S/P pulmonary valve replacement     S/P VSD repair with removal of pulmonary artery band

## 2025-05-09 NOTE — H&P PST ADULT - HISTORY OF PRESENT ILLNESS
40 y/o male with CP, VSD s/p repair, congenital CHB with dual chamber PPM, intellectual disabilaity and seizure disorder (last one 2005)  presents to PST preop for dual chamber PPM- MDT Gen Change. Pt's pacemaker approaching LUCA.       38 y/o male with CP, VSD s/p repair, congenital CHB s/p dual chamber PPM, intellectual disability and seizure disorder (last one 2005) presents to PST preop for dual chamber PPM- MDT Gen Change. Pt's pacemaker approaching LUCA.

## 2025-05-09 NOTE — H&P PST ADULT - NEUROLOGICAL SYMPTOMS
on Artane for involuntary movents of right arm on Artane for involuntary movents of right arm- currently stable takes Artane for involuntary movents of right arm- currently stable

## 2025-05-09 NOTE — H&P PST ADULT - NSANTHOSAYNRD_GEN_A_CORE
Parents denied sleep studies done in the past/No. JOHNNY screening performed.  STOP BANG Legend: 0-2 = LOW Risk; 3-4 = INTERMEDIATE Risk; 5-8 = HIGH Risk No. JOHNNY screening performed.  STOP BANG Legend: 0-2 = LOW Risk; 3-4 = INTERMEDIATE Risk; 5-8 = HIGH Risk

## 2025-05-09 NOTE — H&P PST ADULT - GASTROINTESTINAL
I would like them to complete the antibiotic and use the albuterol as needed. Will reassess at the 9 month well visit and see how he is doing with hopefully improved weather. Next step is to trial on a different type of breathing treatment if ongoing symptoms.    negative normal/soft/nontender/nondistended/normal active bowel sounds soft/nontender/nondistended/normal active bowel sounds

## 2025-05-09 NOTE — H&P PST ADULT - ASSESSMENT
40 y/o male with CP, VSD s/p repair, congenital CHB s/p dual chamber PPM, intellectual disability and seizure disorder (last one 2005) presents to PST preop for dual chamber PPM- MDT Gen Change. Pt's pacemaker approaching LUCA. 38 y/o male with CP, VSD s/p repair, congenital CHB s/p dual chamber PPM, intellectual disability and seizure disorder (last one 2005) presents to PST preop for dual chamber PPM- MDT Gen Change. Pt's pacemaker approaching LUCA.    Preop for dual chamber PPM- MDT Gen Change on 5/15/25  preop instructions given, pt's parent's verbalized understanding  GI prophylaxis and chlorhexidine wash provided  Pt will take Artane and Tegretol AM of procedure as prescribed  As per TRAVIS Marti, pt can remain on Aspirin preop.

## 2025-05-09 NOTE — H&P PST ADULT - NS MD HP INPLANTS MED DEV
[place] 27mm Pulmonary Heart Valve, Implanted 8/5/2019 and Medtronic  PPM  Model ADDRL1 Serial # [id number]  Implant date  3/4/2005/Pacemaker/Heart valve

## 2025-05-09 NOTE — H&P PST ADULT - CARDIOVASCULAR
details… regular rate and rhythm/murmur regular rate and rhythm/S1 S2 present/no pedal edema/murmur/vascular

## 2025-05-09 NOTE — H&P PST ADULT - NECK
Speech Therapy      Visit Type: Evaluation  -  Clinical swallow  Reason for consult: Severe protein calorie malnutrition, hx of dysphagia. MD ordered pureed solids (IDDSI 4) until speech-language pathologist evaluation     Relevant History/Co-morbidities: 1/15/25: ST eval- rec regular solids (naturally softer), thin liquids, meds whole in liquids, constant supervision with meals for now  1/14/25: Presented to ED with Septic shock due to UTI. Acute hypoxic respiratory failure. Chest imaging, \"Suggestion of small right-sided pleural effusion.\"    Relevant PMH to Speech Pathology: Afib, DM-2, CVA, resp failure, venous insuff ulcers, recurrent UTI, COPD    Relevant Past Speech Pathology Intervention: acute sw eval and tx post small bowel obstruction in Nov- on soft and bite sized solids and thin liquids    Baseline: from [facility], regular/thin and no feeding assist needed per RN at the [facility]    SUBJECTIVE  Patient seen in ICU; cooperative and alert. RNJennifer, approved session. Patient reports that she has been on regular diet at the [facility] with no concerns recently. She recalls difficulties with swallowing from previous admission and stated it was likely due to \"being out of it.\"         - Patient/family goals: return home   Pain at onset of session:   RN informed on pain level.    - Location: toe      OBJECTIVE     Oral Mechanism   Oral Motor Assessment: generalized weakness    Cranial Nerve Exam - Speech & Swallow  Trigeminal Nerve (V)  - Motor: symmetrical jaw opening and intact mastication/rotary jaw motion  - Sensory: intact light touch  Facial Nerve (VII)  - Motor: no weakness  - Sensory: no taste changes reported and secretions - intact  Glossopharyngeal (IX)(motor) & Vagus (X) (motor)  - Soft Palate: symmetrical elevation  - Laryngeal Mechanism: functional voice  Hypoglossal (XII)  - Motor: symmetrical protrusion    Diet prior to visit (see the Recommendations section below for up-to-date swallow  recommendations): Liquid- Thin and IDDSI 4 Pureed  - Dentition: sparse  - Feeding: feeds self    Swallow  No temperature spike noted.  Patient positioning: upright in bed  Pretrial vocal quality: normal  Volitional swallow: present. No pain associated with swallow.    Oral cares with toothbrush and toothpaste completed prior to PO trials.     Numbers listed with consistency indicate IDDSI diet level.    Thin (0); sequential swallow; straw; self fed   - Amount given: approx 3 oz water   - Oral phase: WFL, suspect impaired bolus control   - Pharyngeal phase: clinically unremarkable    Pureed (4); teaspoon; self fed   - Amount given: 1 serving applesauce   - Oral: intact   - Pharyngeal phase: clinically unremarkable    Soft and bite-sized (6); teaspoon; self fed   - Amount given: 1 serving drained diced peaches   - Oral: WFL, slow mastication, suspect impaired bolus control and oral residue (mild lingual residue cleared with liquid wash)   - Pharyngeal phase: clinically unremarkable    Regular; self fed   - Amount given: 2 saltine crakcers   - Oral: WFL, oral residue (mild lingual residue cleared with liquid wash)   - Pharyngeal phase: clinically unremarkable    Esophageal symptoms: not present                   Education:   - Present and not ready to learn: patient  Education provided during session:  - dysphagia, aspiration precautions, role of SLP and oral care  - Results of above outlined education: Needs reinforcement    ASSESSMENT  Patient will benefit from skilled therapy to address listed impairments and functional limitations.    Progress: Slow progress  Interfering components: current medical conditions and cognitive deficits    Discharge needs based on today's assessment:  - Current level of function: slightly below baseline level of function  - Therapy needs at discharge: does not require ongoing therapy  - ADL / IADLs (activities / instrumental activities of daily living) requiring support at discharge:  nutrition management (eating/drinking)  - Impairments that require further therapy intervention: dysphagia    Patient suspected to have mild impairment in oral bolus control and mild oral residue with solids that cleared with liquid wash. Otherwise, patient demonstrating functional consumption of thin liquids and regular solids with no overt clinical signs of aspiration. Recommending constant supervision with meals at this time due to patient's history of cognitive defects to ensure meals are consumed safely.     PLAN (while hospitalized)  Monitor tolerance to regular solids (naturally softer) and thin liquids at larger snack or meal    SLP Frequency: 1-2 x per week    SLP Identified Barriers to Discharge: medical status  Interventions:  Assess tolerance of least restrictive oral diet and patient/family education    Plan/Goal Agreement:  Patient agrees with goals and treatment plan      RECOMMENDATIONS     -Diet:          *Liquid- Thin and Regular (naturally softer)    -Medication Administration:         *whole, with liquids and with puree (larger pills in puree as needed)    -Feeding Guidelines:          *SUPERVISION -, constant supervision by staff , POSITIONING -, sit up straight in bed/chair , stay upright after meals , STRATEGIES -, small single sips  , slow rate of intake , frequent oral care , encourage self feeding/assist as needed , small bites  and alternate liquids and solids     -Speech Reviewed Swallow:         *with patient/family, with clinical caregivers and feeding guidelines posted in room    GOALS  Review Date: 1/22/2025  Long Term Goals: (to be met by time of discharge from hospital)  1. Patient will tolerate oral diet of regular solids and thin liquids with <10% S/S of aspiration and stable temps/lungs.     Patient at End of Session:   Location: in bed  Safety measures: alarm system in place/re-engaged and call light within reach  Handoff to: nurse  Documented in the chart in the following  areas: Assessment. Prior Function.       Therapy procedure time and total treatment time can be found documented on the Time Entry flowsheet   FROM/supple/symmetric/no thyromegaly/no palpable masses

## 2025-05-09 NOTE — H&P PST ADULT - NSICDXPASTMEDICALHX_GEN_ALL_CORE_FT
PAST MEDICAL HISTORY:  Atrioventricular block     Cardiac arrest multiple in infancy    Cerebral palsy     Coarctation of aorta     CVA (cerebral infarction) in infancy with residual L sided weakness    Dystonia     Intellectual disability     Mental developmental delay     Seizure disorder     Third degree AV block     VSD (ventricular septal defect) and coarctation of aorta s/p repair

## 2025-05-09 NOTE — H&P PST ADULT - OTHER CARE PROVIDERS
Cardiologist: Dr. Catalina Yoder       Neurology Dr. Mo Nixon Cardiologist: Dr. Catalina Yoder      Neurology Dr. Mo Nixon

## 2025-05-09 NOTE — H&P PST ADULT - ADDITIONAL PE
Denies current covid S&S. Pt denies history of travel outside the country and outside [place] and denies COVID19 positive contacts within the last 14 days. Mallampati: II

## 2025-05-14 NOTE — ASU PATIENT PROFILE, ADULT - TOBACCO USE
Adolescent Privilege Time    Date: September 13, 2024    Time: 1230 - 1300    Skills Taught: How to enjoy leisure activities    Behaviors Noted: Active, Cooperates with Others, and Interested    Explanation: Pt continued watching movie during privilege time.  Pt displayed appropriate behaviors.   Never smoker

## 2025-05-15 ENCOUNTER — TRANSCRIPTION ENCOUNTER (OUTPATIENT)
Age: 40
End: 2025-05-15

## 2025-05-15 ENCOUNTER — OUTPATIENT (OUTPATIENT)
Dept: OUTPATIENT SERVICES | Facility: HOSPITAL | Age: 40
LOS: 1 days | End: 2025-05-15
Payer: MEDICARE

## 2025-05-15 VITALS
DIASTOLIC BLOOD PRESSURE: 76 MMHG | SYSTOLIC BLOOD PRESSURE: 138 MMHG | OXYGEN SATURATION: 97 % | HEART RATE: 62 BPM | RESPIRATION RATE: 14 BRPM

## 2025-05-15 DIAGNOSIS — I44.2 ATRIOVENTRICULAR BLOCK, COMPLETE: ICD-10-CM

## 2025-05-15 DIAGNOSIS — Z92.89 PERSONAL HISTORY OF OTHER MEDICAL TREATMENT: Chronic | ICD-10-CM

## 2025-05-15 DIAGNOSIS — Z98.890 OTHER SPECIFIED POSTPROCEDURAL STATES: Chronic | ICD-10-CM

## 2025-05-15 LAB
ANION GAP SERPL CALC-SCNC: 11 MMOL/L — SIGNIFICANT CHANGE UP (ref 7–14)
BUN SERPL-MCNC: 13 MG/DL — SIGNIFICANT CHANGE UP (ref 7–23)
CALCIUM SERPL-MCNC: 9.4 MG/DL — SIGNIFICANT CHANGE UP (ref 8.4–10.5)
CHLORIDE SERPL-SCNC: 103 MMOL/L — SIGNIFICANT CHANGE UP (ref 98–107)
CO2 SERPL-SCNC: 26 MMOL/L — SIGNIFICANT CHANGE UP (ref 22–31)
CREAT SERPL-MCNC: 0.73 MG/DL — SIGNIFICANT CHANGE UP (ref 0.5–1.3)
EGFR: 117 ML/MIN/1.73M2 — SIGNIFICANT CHANGE UP
EGFR: 117 ML/MIN/1.73M2 — SIGNIFICANT CHANGE UP
GLUCOSE SERPL-MCNC: 97 MG/DL — SIGNIFICANT CHANGE UP (ref 70–99)
MAGNESIUM SERPL-MCNC: 2.1 MG/DL — SIGNIFICANT CHANGE UP (ref 1.6–2.6)
POTASSIUM SERPL-MCNC: 4.8 MMOL/L — SIGNIFICANT CHANGE UP (ref 3.5–5.3)
POTASSIUM SERPL-SCNC: 4.8 MMOL/L — SIGNIFICANT CHANGE UP (ref 3.5–5.3)
SODIUM SERPL-SCNC: 140 MMOL/L — SIGNIFICANT CHANGE UP (ref 135–145)

## 2025-05-15 PROCEDURE — 93010 ELECTROCARDIOGRAM REPORT: CPT

## 2025-05-15 PROCEDURE — 33228 REMV&REPLC PM GEN DUAL LEAD: CPT

## 2025-05-15 PROCEDURE — 93010 ELECTROCARDIOGRAM REPORT: CPT | Mod: 59

## 2025-05-15 RX ORDER — CARBAMAZEPINE 200 MG/1
1 TABLET ORAL
Refills: 0 | DISCHARGE

## 2025-05-15 RX ORDER — B1/B2/B3/B5/B6/B12/VIT C/FOLIC 500-0.5 MG
1 TABLET ORAL
Refills: 0 | DISCHARGE

## 2025-05-15 RX ORDER — HYDROMORPHONE/SOD CHLOR,ISO/PF 2 MG/10 ML
0.3 SYRINGE (ML) INJECTION
Refills: 0 | Status: DISCONTINUED | OUTPATIENT
Start: 2025-05-15 | End: 2025-05-16

## 2025-05-15 RX ORDER — TRIHEXYPHENIDYL HYDROCHLORIDE 2 MG/1
1 TABLET ORAL
Refills: 0 | DISCHARGE

## 2025-05-15 RX ORDER — CALCIUM CARBONATE/VITAMIN D3 500MG-5MCG
1 TABLET ORAL
Refills: 0 | DISCHARGE

## 2025-05-15 RX ORDER — CARBAMAZEPINE 200 MG/1
2 TABLET ORAL
Refills: 0 | DISCHARGE

## 2025-05-15 RX ORDER — HYDROMORPHONE/SOD CHLOR,ISO/PF 2 MG/10 ML
0.5 SYRINGE (ML) INJECTION
Refills: 0 | Status: DISCONTINUED | OUTPATIENT
Start: 2025-05-15 | End: 2025-05-16

## 2025-05-15 RX ORDER — ASPIRIN 325 MG
1 TABLET ORAL
Refills: 0 | DISCHARGE

## 2025-05-15 NOTE — CHART NOTE - NSCHARTNOTEFT_GEN_A_CORE
Mother at bedside who is HCP consented for patient    In brief this is a 39-year-old with a history of history of CP, VSD s/p repair, congenital CHB with dual chamber PPM whose pacemaker has about 3 months to LUCA and intellectual disability presented to [facility] for PPM generator change. As per mother patient does not have any complaints or concerns. Reviewed medication list with mother and updated on patient's chart. Explained about the procedure in detail to the mother and father at bedside and all risks/benefits of the procedure explained and the parents understood and the mother signed all the consents. Reviewed all scripts note and pre-surgical testing H&P. Mother at bedside who is HCP consented for patient    In brief this is a 39-year-old with a history of history of CP, VSD s/p repair, congenital CHB with dual chamber PPM whose pacemaker has about 3 months to LUCA and intellectual disability presented to [facility] for PPM generator change. As per mother patient does not have any complaints or concerns. Reviewed medication list with mother and updated on patient's chart. Explained about the procedure in detail to the mother and father at bedside and all risks/benefits of the procedure explained and the parents understood and the mother signed all the consents. Reviewed all scripts note and pre-surgical testing H&P.    EKG: Sinus rhythm with 1st degree AV block at a rate of 71, RBBB, left anterior fascicular block, TWI in leads V5-V6 and QTc of 443

## 2025-05-15 NOTE — ASU DISCHARGE PLAN (ADULT/PEDIATRIC) - CARE PROVIDER_API CALL
Bassam Garcia  Cardiovascular Disease  [id number] [address] [place] [address], [address]  [place] [address]  Phone: [phone]  Fax: [phone]  Follow Up Time:

## 2025-05-15 NOTE — ASU PREOP CHECKLIST - HEIGHT IN CM
PATIENT INSTRUCTED TO BE:    - NOTHING TO EAT AFTER MIDNIGHT OR CHEW, EXCEPT CAN HAVE SIPS OF WATER WITH MEDICATIONS OR CLEAR LIQUIDS 2 HOURS PRIOR TO SURGERY ARRIVAL TIME , NO MORE THAN 8 OZ. (NOTHING RED)     - TO HOLD ALL VITAMINS, SUPPLEMENTS, NSAIDS FOR ONE WEEK PRIOR TO THEIR SURGICAL PROCEDURE  LAST DOSE LAST DOSE CIMZIA 3/4/25, LAST DOSE NSAID/SUPPLEMENTS/VITAMIN 3/19/25  - DO NOT TAKE ______________________ 7 DAYS PRIOR TO PROCEDURE PER ANESTHESIA RECOMMENDATIONS/INSTRUCTIONS     - INSTRUCTED PT TO USE SURGICAL SOAP 1 TIME THE NIGHT PRIOR TO SURGERY ___________ OR THE AM OF SURGERY _____________   USE THE SOAP FROM NECK TO TOES, AVOID THEIR FACE, HAIR, AND PRIVATE PARTS. IF USE THE SOAP THE NIGHT PRIOR TO SURGERY, CHANGE BED LINENS AND NO PETS IN THE BED.     INSTRUCTED NO LOTIONS, JEWELRY, PIERCINGS,  NAIL POLISH, OR DEODORANT DAY OF SURGERY    - IF DIABETIC, CHECK BLOOD GLUCOSE IF LESS THAN 70 OR HAVING SYMPTOMS CALL THE PREOP AREA FOR INSTRUCTIONS ON AM OF SURGERY ([phone]  -INSTRUCTED TO TAKE THE FOLLOWING MEDICATIONS THE DAY OF SURGERY WITH SIPS OF WATER:   ATORVASTATIN, CLARITIN        - DO NOT BRING ANY MEDICATIONS WITH YOU TO THE HOSPITAL THE DAY OF SURGERY, EXCEPT IF USE INHALERS. BRING INHALERS DAY OF SURGERY       - BRING CPAP OR BIPAP TO THE HOSPITAL ONLY IF YOU ARE SPENDING THE NIGHT    - DO NOT SMOKE OR VAPE 24 HOURS PRIOR TO PROCEDURE PER ANESTHESIA REQUEST     -MAKE SURE YOU HAVE A RIDE HOME OR SOMEONE TO STAY WITH YOU THE DAY OF THE PROCEDURE AFTER YOU GO HOME     - FOLLOW ANY OTHER INSTRUCTIONS GIVEN TO YOU BY YOUR SURGEON'S OFFICE.     - DAY OF SURGERY ____________,[facility] [employer] ( [address]), YOU CAN  PARK OR SELF PARK. ENTER THE PAVILION THRU MAIN ENTRANCE, TAKE ELEVATORS TO THE FIRST FLOOR, CHECK IN AT THE DESK FOR REGISTRATION/ SURGERY.  - YOU WILL RECEIVE A PHONE CALL THE DAY PRIOR TO SURGERY BETWEEN 1PM AND 4 PM WITH ARRIVAL TIME, IF YOUR  SURGERY IS ON A MONDAY YOU WILL RECEIVE A CALL THE FRIDAY PRIOR TO SURGERY DATE    - BRING CASH OR CREDIT CARD FOR COPAYMENT OF MEDICATIONS AFTER SURGERY IF YOU USE THE HOSPITAL PHARMACY (MEDS TO BED)    - PREADMISSION TESTING NURSE [phone] IF HAVE ANY QUESTIONS     -PATIENT PROVIDED THE NUMBER FOR PREOP SURGICAL DEPT IF HAD QUESTIONS AFTER HOURS PRIOR TO SURGERY ([phone]  INFORMED PT IF NO ANSWER, LEAVE A MESSAGE AND SOMEONE WILL RETURN THEIR CALL       PATIENT VERBALIZED UNDERSTANDING    162.56

## 2025-05-15 NOTE — ASU DISCHARGE PLAN (ADULT/PEDIATRIC) - ASU DC SPECIAL INSTRUCTIONSFT
Post procedure pacemaker instructions reviewed with patient's parents.   Written instructions and contact information provided.   He was given a home monitor with verbal and written instructions.   All questions answered.   Instructed to remove the dressing in 24 hours at which time he will be able to shower. No need to redress the site.   Follow-up appointment in the device clinic is on Friday 5/30/25 at 1:20pm  4th floor Oncology Building   [phone].

## 2025-05-15 NOTE — ASU DISCHARGE PLAN (ADULT/PEDIATRIC) - FINANCIAL ASSISTANCE
[facility] provides services at a reduced cost to those who are determined to be eligible through [facility]’s financial assistance program. Information regarding [facility]’s financial assistance program can be found by going to [email or web address] or by calling [phone].

## 2025-05-22 ENCOUNTER — TRANSCRIPTION ENCOUNTER (OUTPATIENT)
Age: 40
End: 2025-05-22

## 2025-05-23 ENCOUNTER — TRANSCRIPTION ENCOUNTER (OUTPATIENT)
Age: 40
End: 2025-05-23

## 2025-05-30 ENCOUNTER — NON-APPOINTMENT (OUTPATIENT)
Age: 40
End: 2025-05-30

## 2025-05-30 ENCOUNTER — APPOINTMENT (OUTPATIENT)
Dept: ELECTROPHYSIOLOGY | Facility: CLINIC | Age: 40
End: 2025-05-30
Payer: MEDICARE

## 2025-05-30 VITALS — SYSTOLIC BLOOD PRESSURE: 137 MMHG | DIASTOLIC BLOOD PRESSURE: 77 MMHG | HEART RATE: 69 BPM

## 2025-05-30 PROBLEM — I44.30 UNSPECIFIED ATRIOVENTRICULAR BLOCK: Chronic | Status: ACTIVE | Noted: 2025-05-09

## 2025-05-30 PROCEDURE — 99024 POSTOP FOLLOW-UP VISIT: CPT

## 2025-06-04 ENCOUNTER — TRANSCRIPTION ENCOUNTER (OUTPATIENT)
Age: 40
End: 2025-06-04

## 2025-06-09 ENCOUNTER — EMERGENCY (EMERGENCY)
Facility: HOSPITAL | Age: 40
LOS: 1 days | End: 2025-06-09
Attending: STUDENT IN AN ORGANIZED HEALTH CARE EDUCATION/TRAINING PROGRAM | Admitting: STUDENT IN AN ORGANIZED HEALTH CARE EDUCATION/TRAINING PROGRAM
Payer: MEDICARE

## 2025-06-09 VITALS
DIASTOLIC BLOOD PRESSURE: 70 MMHG | SYSTOLIC BLOOD PRESSURE: 140 MMHG | HEART RATE: 90 BPM | HEIGHT: 64 IN | WEIGHT: 134.92 LBS | OXYGEN SATURATION: 100 % | TEMPERATURE: 98 F | RESPIRATION RATE: 18 BRPM

## 2025-06-09 DIAGNOSIS — Z98.890 OTHER SPECIFIED POSTPROCEDURAL STATES: Chronic | ICD-10-CM

## 2025-06-09 DIAGNOSIS — Z92.89 PERSONAL HISTORY OF OTHER MEDICAL TREATMENT: Chronic | ICD-10-CM

## 2025-06-09 PROCEDURE — 99283 EMERGENCY DEPT VISIT LOW MDM: CPT

## 2025-06-09 NOTE — ED ADULT TRIAGE NOTE - CHIEF COMPLAINT QUOTE
c/o abdominal pain with nausea and diarrhea no vomiting some bloody stools x 1 day. Pt denies CP, SOB, . Fever and Chills. Hx Pacemaker (heart block) denies a.c use.

## 2025-06-10 NOTE — ED PROVIDER NOTE - PHYSICAL EXAMINATION
Heather JONES:  VITALS: Initial triage and subsequent vitals have been reviewed by me.  GEN APPEARANCE: Alert, cooperative. Non-toxic appearing. Well appearing. NAD.  HEAD: Atraumatic, normocephalic   EYES: PERRLa, EOMI, vision grossly intact.   EARS: Gross hearing intact.   NOSE: No nasal discharge, no external evidence of epistaxis.   THROAT: MMM. Oral cavity and pharynx normal. No inflammation, no swelling, no exudate, no oral lesions.  NECK: Supple  CV: RRR, S1S2, no c/r/m/g. No cyanosis. Extremities warm, well perfused. Cap refill <2 seconds. No bruits.   LUNGS: CTAB. No wheezing. No rales. No rhonchi. No diminished breath sounds.   ABDOMEN: Soft, NTND. No guarding or rebound. No masses.   MSK/EXT: Spine appears normal, no spine point tenderness. No CVA ttp. Normal muscular development. Pelvis stable. No obvious joint or bony deformity, no peripheral edema.   NEURO: Alert, follows commands. Weight bearing normal. Speech normal. Sensation and motor normal x4 extremities.   SKIN: Normal color for race, warm, dry and intact. No evidence of rash.  PSYCH: Normal mood and affect.  RECTAL: no hemmroids no jhonatan bleeding   Exam (Male): Un-Circumcised penis, external anatomy appears normal, no e/o priapism, no e/o trauma. No testicular swelling, erythema, or tenderness. No urethral discharge or bleeding.   EXAM WITH: Denis DHILLON (3) slightly limited

## 2025-06-10 NOTE — ED PROVIDER NOTE - CLINICAL SUMMARY MEDICAL DECISION MAKING FREE TEXT BOX
Heather JONES:    Exam his vitals are stable nontoxic-appearing with physical exam as above DDx patient is here with abdominal pain, has a family history of IBS, however on my assessment he has no abdominal tenderness he is very well-appearing acting at baseline, no obvious bleeding and is hemodynamically stable has a reassuring rectal and  exam. Not vomiting, no fever, Tolerating PO at baseline.  No urinary complaints.  Given his abdomen is nontender soft and not displaying any other red flags I had an extensive risk versus benefit versus alternative discussion with mother regarding ED evaluation and workup today, labs and a CT were offered however after risk-benefit and alternative discussion mother and father are comfortable with monitoring patient's symptoms at home, will return should he develop more abdominal pain fever abdominal discomfort or have persistent bleeding from his rectum, I suspect he had some constipation and some slight mucosal bleeding thereafter.  Mother and father both agreeable and comfortable with plan for discharge and monitoring him at home.  Strict return precautions were discussed.  Comfortable taking him home.

## 2025-06-10 NOTE — ED PROVIDER NOTE - PATIENT PORTAL LINK FT
You can access the FollowMyHealth Patient Portal offered by [facility] by registering at the following website: [email or web address]. By joining [facility]’s FollowMyHealth portal, you will also be able to view your health information using other applications (apps) compatible with our system.

## 2025-06-10 NOTE — ED PROVIDER NOTE - OBJECTIVE STATEMENT
Heather JONES: 39-year-old male complex medical history third-degree heart block status post pacer seizure disorder intellectual disability CVA cerebral palsy coarctation of aorta, no prior intra-abdominal surgical history presents with a chief complaint of intermittent episodes of diffuse abdominal pain today associated with intermittent episodes of first passing hard stools and then softer stools and then had an episode of what mother is describing as some blood in stools, mother was able to provide a sample in the ER sample appears to be most consistent with bloody mucus, mother reports no nausea no vomiting no fever no chest pain no trouble breathing, patient is acting at his normal baseline self, he can move and feel everything, offers no urinary complaints no groin complaints. Eating normally today.

## 2025-06-10 NOTE — ED PROVIDER NOTE - NSFOLLOWUPINSTRUCTIONS_ED_ALL_ED_FT
Thank you for visiting our Emergency Department, it has been a pleasure taking part in your healthcare.  Please read and follow all of your discharge instructions. These instructions contain important information regarding your Emergency Department visit and future medical care.     Your discharge diagnosis is: abdominal pain  Please take all discharge medications as indicated below:  Please continue all medications as rx'd by your PMD.  Please follow up with your Primary Care Doctor (PMD) within x48 hours.  Bring and show your PMD all documents and results you were given during your ED visit.  If you do not have a primary care doctor please call [phone] to establish primary care.  A copy of resulted labs, imaging, and findings have been provided to you.   You can also access all of your results through the [facility].  If you have questions about your results, please call the Emergency Department.  During your visit and at time of discharge, you had a detailed discussion with your provider regarding your diagnosis, care management and discharge planning.  Topics that were discussed included but were not limited to: return precautions, follow up visits with existing or new providers, new prescriptions and/or medication changes, wound and/or splint/cast care, incidental laboratory/radiology findings, or other care   aspects specific to your diagnosis and treatment. You have been given the opportunity to have your questions answered. At this time you have been deemed stable and fit for discharge.  Return precautions to the Emergency Department include but are not limited to: unrelenting nausea, vomiting, fever, chills, chest pain, shortness of breath, dizziness, chest or abdominal pain, worsening back pain, syncope, blood in urine or stool, headache that doesn't resolve, numbness or tingling, loss of sensation, loss of motor function, or any other concerning symptoms.    Please bring all ED Documents you were given during your stay to your PMD.   They contain important information for you and your PMD, including incidental lab/radiology findings that your PMD should be aware of.

## 2025-08-06 ENCOUNTER — TRANSCRIPTION ENCOUNTER (OUTPATIENT)
Age: 40
End: 2025-08-06

## 2025-08-06 DIAGNOSIS — M85.80 OTHER SPECIFIED DISORDERS OF BONE DENSITY AND STRUCTURE, UNSPECIFIED SITE: ICD-10-CM

## 2025-08-06 DIAGNOSIS — G40.909 EPILEPSY, UNSPECIFIED, NOT INTRACTABLE, W/OUT STATUS EPILEPTICUS: ICD-10-CM

## 2025-08-06 DIAGNOSIS — Z51.81 ENCOUNTER FOR THERAPEUTIC DRUG LVL MONITORING: ICD-10-CM

## 2025-08-08 NOTE — PROGRESS NOTE PEDS - ASSESSMENT
Yes if it has been at least 10 days   33 y/o with CP, autism, hx CVA, now status post  pulmonary valve replacement (27 mm bovine pericardial valve) due to free PI on 8/5/19. He is doing well post-operatively extubated, on no vasoactive meds. He has underlying heart block for which he has an epicardial DDD  pacemaker,  no additional rhythm issues intraoperatively. Prior cardiac surgery included VSD and CoA repair. 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

## 2025-08-16 LAB
25(OH)D3 SERPL-MCNC: 31.1 NG/ML
ALBUMIN SERPL ELPH-MCNC: 4.6 G/DL
ALP BLD-CCNC: 83 U/L
ALT SERPL-CCNC: 29 U/L
ANION GAP SERPL CALC-SCNC: 14 MMOL/L
AST SERPL-CCNC: 19 U/L
BILIRUB DIRECT SERPL-MCNC: 0.11 MG/DL
BILIRUB INDIRECT SERPL-MCNC: 0.2 MG/DL
BILIRUB SERPL-MCNC: 0.3 MG/DL
BUN SERPL-MCNC: 12 MG/DL
CALCIUM SERPL-MCNC: 8.9 MG/DL
CHLORIDE SERPL-SCNC: 101 MMOL/L
CO2 SERPL-SCNC: 21 MMOL/L
CREAT SERPL-MCNC: 0.64 MG/DL
EGFRCR SERPLBLD CKD-EPI 2021: 121 ML/MIN/1.73M2
GLUCOSE SERPL-MCNC: 117 MG/DL
HCT VFR BLD CALC: 41.4 %
HGB BLD-MCNC: 14.2 G/DL
MCHC RBC-ENTMCNC: 28.8 PG
MCHC RBC-ENTMCNC: 34.3 G/DL
MCV RBC AUTO: 84 FL
PLATELET # BLD AUTO: 236 K/UL
POTASSIUM SERPL-SCNC: 4 MMOL/L
PROT SERPL-MCNC: 6.5 G/DL
RBC # BLD: 4.93 M/UL
RBC # FLD: 12 %
SODIUM SERPL-SCNC: 137 MMOL/L
WBC # FLD AUTO: 6.47 K/UL

## 2025-08-17 LAB — CARBAMAZEPINE SERPL-MCNC: 11.8 UG/ML

## 2025-08-26 ENCOUNTER — RX RENEWAL (OUTPATIENT)
Age: 40
End: 2025-08-26

## 2025-08-29 ENCOUNTER — APPOINTMENT (OUTPATIENT)
Dept: ELECTROPHYSIOLOGY | Facility: CLINIC | Age: 40
End: 2025-08-29
Payer: MEDICARE

## 2025-08-29 ENCOUNTER — NON-APPOINTMENT (OUTPATIENT)
Age: 40
End: 2025-08-29

## 2025-08-29 PROCEDURE — 93296 REM INTERROG EVL PM/IDS: CPT

## 2025-08-29 PROCEDURE — 93294 REM INTERROG EVL PM/LDLS PM: CPT
